# Patient Record
Sex: FEMALE | Race: WHITE | NOT HISPANIC OR LATINO | Employment: OTHER | URBAN - METROPOLITAN AREA
[De-identification: names, ages, dates, MRNs, and addresses within clinical notes are randomized per-mention and may not be internally consistent; named-entity substitution may affect disease eponyms.]

---

## 2017-05-24 ENCOUNTER — RECORDS - HEALTHEAST (OUTPATIENT)
Dept: LAB | Facility: CLINIC | Age: 62
End: 2017-05-24

## 2017-05-25 LAB
CHOLEST SERPL-MCNC: 173 MG/DL
FASTING STATUS PATIENT QL REPORTED: NO
HDLC SERPL-MCNC: 41 MG/DL
LDLC SERPL CALC-MCNC: 88 MG/DL
TRIGL SERPL-MCNC: 218 MG/DL

## 2018-06-11 ENCOUNTER — RECORDS - HEALTHEAST (OUTPATIENT)
Dept: LAB | Facility: CLINIC | Age: 63
End: 2018-06-11

## 2018-06-11 LAB
ALBUMIN SERPL-MCNC: 4.2 G/DL (ref 3.5–5)
ALP SERPL-CCNC: 71 U/L (ref 45–120)
ALT SERPL W P-5'-P-CCNC: 20 U/L (ref 0–45)
ANION GAP SERPL CALCULATED.3IONS-SCNC: 11 MMOL/L (ref 5–18)
AST SERPL W P-5'-P-CCNC: 18 U/L (ref 0–40)
BILIRUB SERPL-MCNC: 0.9 MG/DL (ref 0–1)
BUN SERPL-MCNC: 22 MG/DL (ref 8–22)
CALCIUM SERPL-MCNC: 10.5 MG/DL (ref 8.5–10.5)
CHLORIDE BLD-SCNC: 101 MMOL/L (ref 98–107)
CHOLEST SERPL-MCNC: 184 MG/DL
CO2 SERPL-SCNC: 29 MMOL/L (ref 22–31)
CREAT SERPL-MCNC: 0.75 MG/DL (ref 0.6–1.1)
FASTING STATUS PATIENT QL REPORTED: NO
GFR SERPL CREATININE-BSD FRML MDRD: >60 ML/MIN/1.73M2
GLUCOSE BLD-MCNC: 156 MG/DL (ref 70–125)
HDLC SERPL-MCNC: 45 MG/DL
LDLC SERPL CALC-MCNC: 96 MG/DL
POTASSIUM BLD-SCNC: 4.4 MMOL/L (ref 3.5–5)
PROT SERPL-MCNC: 7.4 G/DL (ref 6–8)
SODIUM SERPL-SCNC: 141 MMOL/L (ref 136–145)
TRIGL SERPL-MCNC: 216 MG/DL

## 2018-06-12 LAB
HPV SOURCE: NORMAL
HUMAN PAPILLOMA VIRUS 16 DNA: NEGATIVE
HUMAN PAPILLOMA VIRUS 18 DNA: NEGATIVE
HUMAN PAPILLOMA VIRUS FINAL DIAGNOSIS: NORMAL
HUMAN PAPILLOMA VIRUS OTHER HR: NEGATIVE
SPECIMEN DESCRIPTION: NORMAL

## 2018-06-20 LAB
BKR LAB AP ABNORMAL BLEEDING: NO
BKR LAB AP BIRTH CONTROL/HORMONES: NORMAL
BKR LAB AP CERVICAL APPEARANCE: NORMAL
BKR LAB AP GYN ADEQUACY: NORMAL
BKR LAB AP GYN INTERPRETATION: NORMAL
BKR LAB AP HPV REFLEX: NORMAL
BKR LAB AP LMP: NORMAL
BKR LAB AP PATIENT STATUS: NORMAL
BKR LAB AP PREVIOUS ABNORMAL: NORMAL
BKR LAB AP PREVIOUS NORMAL: 2012
HIGH RISK?: NO
PATH REPORT.COMMENTS IMP SPEC: NORMAL
RESULT FLAG (HE HISTORICAL CONVERSION): NORMAL

## 2019-04-23 ENCOUNTER — RECORDS - HEALTHEAST (OUTPATIENT)
Dept: LAB | Facility: CLINIC | Age: 64
End: 2019-04-23

## 2019-04-23 LAB
ALBUMIN SERPL-MCNC: 4.1 G/DL (ref 3.5–5)
ANION GAP SERPL CALCULATED.3IONS-SCNC: 7 MMOL/L (ref 5–18)
BUN SERPL-MCNC: 19 MG/DL (ref 8–22)
CALCIUM SERPL-MCNC: 10.2 MG/DL (ref 8.5–10.5)
CHLORIDE BLD-SCNC: 104 MMOL/L (ref 98–107)
CO2 SERPL-SCNC: 29 MMOL/L (ref 22–31)
CREAT SERPL-MCNC: 0.79 MG/DL (ref 0.6–1.1)
GFR SERPL CREATININE-BSD FRML MDRD: >60 ML/MIN/1.73M2
GLUCOSE BLD-MCNC: 199 MG/DL (ref 70–125)
PHOSPHATE SERPL-MCNC: 3 MG/DL (ref 2.5–4.5)
POTASSIUM BLD-SCNC: 5.2 MMOL/L (ref 3.5–5)
SODIUM SERPL-SCNC: 140 MMOL/L (ref 136–145)

## 2019-10-15 ENCOUNTER — RECORDS - HEALTHEAST (OUTPATIENT)
Dept: LAB | Facility: CLINIC | Age: 64
End: 2019-10-15

## 2019-10-15 LAB
CHOLEST SERPL-MCNC: 211 MG/DL
FASTING STATUS PATIENT QL REPORTED: ABNORMAL
HDLC SERPL-MCNC: 45 MG/DL
LDLC SERPL CALC-MCNC: 137 MG/DL
TRIGL SERPL-MCNC: 146 MG/DL

## 2020-10-20 ENCOUNTER — RECORDS - HEALTHEAST (OUTPATIENT)
Dept: LAB | Facility: CLINIC | Age: 65
End: 2020-10-20

## 2020-10-20 LAB
ALBUMIN SERPL-MCNC: 4.2 G/DL (ref 3.5–5)
ALP SERPL-CCNC: 74 U/L (ref 45–120)
ALT SERPL W P-5'-P-CCNC: 20 U/L (ref 0–45)
ANION GAP SERPL CALCULATED.3IONS-SCNC: 7 MMOL/L (ref 5–18)
AST SERPL W P-5'-P-CCNC: 17 U/L (ref 0–40)
BILIRUB SERPL-MCNC: 1.1 MG/DL (ref 0–1)
BUN SERPL-MCNC: 15 MG/DL (ref 8–22)
CALCIUM SERPL-MCNC: 9.9 MG/DL (ref 8.5–10.5)
CHLORIDE BLD-SCNC: 102 MMOL/L (ref 98–107)
CHOLEST SERPL-MCNC: 192 MG/DL
CO2 SERPL-SCNC: 31 MMOL/L (ref 22–31)
CREAT SERPL-MCNC: 0.83 MG/DL (ref 0.6–1.1)
FASTING STATUS PATIENT QL REPORTED: ABNORMAL
GFR SERPL CREATININE-BSD FRML MDRD: >60 ML/MIN/1.73M2
GLUCOSE BLD-MCNC: 197 MG/DL (ref 70–125)
HDLC SERPL-MCNC: 44 MG/DL
LDLC SERPL CALC-MCNC: 113 MG/DL
POTASSIUM BLD-SCNC: 4.1 MMOL/L (ref 3.5–5)
PROT SERPL-MCNC: 7 G/DL (ref 6–8)
SODIUM SERPL-SCNC: 140 MMOL/L (ref 136–145)
TRIGL SERPL-MCNC: 176 MG/DL

## 2020-10-21 LAB — 25(OH)D3 SERPL-MCNC: 22.1 NG/ML (ref 30–80)

## 2021-05-25 ENCOUNTER — RECORDS - HEALTHEAST (OUTPATIENT)
Dept: ADMINISTRATIVE | Facility: CLINIC | Age: 66
End: 2021-05-25

## 2021-05-28 ENCOUNTER — RECORDS - HEALTHEAST (OUTPATIENT)
Dept: ADMINISTRATIVE | Facility: CLINIC | Age: 66
End: 2021-05-28

## 2021-05-30 ENCOUNTER — RECORDS - HEALTHEAST (OUTPATIENT)
Dept: ADMINISTRATIVE | Facility: CLINIC | Age: 66
End: 2021-05-30

## 2021-10-29 ENCOUNTER — LAB REQUISITION (OUTPATIENT)
Dept: LAB | Facility: CLINIC | Age: 66
End: 2021-10-29

## 2021-10-29 DIAGNOSIS — E78.5 HYPERLIPIDEMIA, UNSPECIFIED: ICD-10-CM

## 2021-10-29 LAB
ALBUMIN SERPL-MCNC: 4.4 G/DL (ref 3.5–5)
ALP SERPL-CCNC: 80 U/L (ref 45–120)
ALT SERPL W P-5'-P-CCNC: 14 U/L (ref 0–45)
ANION GAP SERPL CALCULATED.3IONS-SCNC: 12 MMOL/L (ref 5–18)
AST SERPL W P-5'-P-CCNC: 16 U/L (ref 0–40)
BILIRUB SERPL-MCNC: 1.2 MG/DL (ref 0–1)
BUN SERPL-MCNC: 13 MG/DL (ref 8–22)
CALCIUM SERPL-MCNC: 10.4 MG/DL (ref 8.5–10.5)
CHLORIDE BLD-SCNC: 103 MMOL/L (ref 98–107)
CHOLEST SERPL-MCNC: 181 MG/DL
CO2 SERPL-SCNC: 28 MMOL/L (ref 22–31)
CREAT SERPL-MCNC: 0.84 MG/DL (ref 0.6–1.1)
GFR SERPL CREATININE-BSD FRML MDRD: 73 ML/MIN/1.73M2
GLUCOSE BLD-MCNC: 175 MG/DL (ref 70–125)
HDLC SERPL-MCNC: 45 MG/DL
LDLC SERPL CALC-MCNC: 109 MG/DL
POTASSIUM BLD-SCNC: 4.3 MMOL/L (ref 3.5–5)
PROT SERPL-MCNC: 7.4 G/DL (ref 6–8)
SODIUM SERPL-SCNC: 143 MMOL/L (ref 136–145)
TRIGL SERPL-MCNC: 135 MG/DL

## 2021-10-29 PROCEDURE — 80061 LIPID PANEL: CPT | Performed by: FAMILY MEDICINE

## 2021-10-29 PROCEDURE — 80053 COMPREHEN METABOLIC PANEL: CPT | Performed by: FAMILY MEDICINE

## 2022-04-13 ENCOUNTER — LAB REQUISITION (OUTPATIENT)
Dept: LAB | Facility: CLINIC | Age: 67
End: 2022-04-13

## 2022-04-13 DIAGNOSIS — R50.9 FEVER, UNSPECIFIED: ICD-10-CM

## 2022-04-13 PROCEDURE — 87186 SC STD MICRODIL/AGAR DIL: CPT | Performed by: PHYSICIAN ASSISTANT

## 2022-04-15 LAB — BACTERIA UR CULT: ABNORMAL

## 2022-06-22 ENCOUNTER — LAB REQUISITION (OUTPATIENT)
Dept: LAB | Facility: CLINIC | Age: 67
End: 2022-06-22

## 2022-06-22 PROCEDURE — 87086 URINE CULTURE/COLONY COUNT: CPT | Performed by: PHYSICIAN ASSISTANT

## 2022-06-25 LAB — BACTERIA UR CULT: ABNORMAL

## 2022-07-15 ENCOUNTER — LAB REQUISITION (OUTPATIENT)
Dept: LAB | Facility: CLINIC | Age: 67
End: 2022-07-15

## 2022-07-15 DIAGNOSIS — E78.5 HYPERLIPIDEMIA, UNSPECIFIED: ICD-10-CM

## 2022-07-15 LAB
ALBUMIN SERPL BCG-MCNC: 4.5 G/DL (ref 3.5–5.2)
ALP SERPL-CCNC: 81 U/L (ref 35–104)
ALT SERPL W P-5'-P-CCNC: 14 U/L (ref 10–35)
ANION GAP SERPL CALCULATED.3IONS-SCNC: 14 MMOL/L (ref 7–15)
AST SERPL W P-5'-P-CCNC: 21 U/L (ref 10–35)
BILIRUB SERPL-MCNC: 0.6 MG/DL
BUN SERPL-MCNC: 14.5 MG/DL (ref 8–23)
CALCIUM SERPL-MCNC: 10 MG/DL (ref 8.8–10.2)
CHLORIDE SERPL-SCNC: 103 MMOL/L (ref 98–107)
CHOLEST SERPL-MCNC: 199 MG/DL
CREAT SERPL-MCNC: 0.9 MG/DL (ref 0.51–0.95)
DEPRECATED HCO3 PLAS-SCNC: 26 MMOL/L (ref 22–29)
GFR SERPL CREATININE-BSD FRML MDRD: 70 ML/MIN/1.73M2
GLUCOSE SERPL-MCNC: 107 MG/DL (ref 70–99)
HDLC SERPL-MCNC: 49 MG/DL
LDLC SERPL CALC-MCNC: 116 MG/DL
NONHDLC SERPL-MCNC: 150 MG/DL
POTASSIUM SERPL-SCNC: 4.5 MMOL/L (ref 3.4–5.3)
PROT SERPL-MCNC: 7.2 G/DL (ref 6.4–8.3)
SODIUM SERPL-SCNC: 143 MMOL/L (ref 136–145)
TRIGL SERPL-MCNC: 172 MG/DL

## 2022-07-15 PROCEDURE — 80061 LIPID PANEL: CPT | Performed by: FAMILY MEDICINE

## 2022-07-15 PROCEDURE — 80053 COMPREHEN METABOLIC PANEL: CPT | Performed by: FAMILY MEDICINE

## 2022-07-21 ENCOUNTER — ANCILLARY PROCEDURE (OUTPATIENT)
Dept: MAMMOGRAPHY | Facility: HOSPITAL | Age: 67
End: 2022-07-21
Attending: FAMILY MEDICINE
Payer: COMMERCIAL

## 2022-07-21 DIAGNOSIS — Z12.31 SCREENING MAMMOGRAM, ENCOUNTER FOR: ICD-10-CM

## 2022-07-21 PROCEDURE — 77067 SCR MAMMO BI INCL CAD: CPT

## 2023-07-05 ENCOUNTER — LAB REQUISITION (OUTPATIENT)
Dept: LAB | Facility: CLINIC | Age: 68
End: 2023-07-05

## 2023-07-05 DIAGNOSIS — E11.22 TYPE 2 DIABETES MELLITUS WITH DIABETIC CHRONIC KIDNEY DISEASE (H): ICD-10-CM

## 2023-07-05 LAB
ALBUMIN SERPL BCG-MCNC: 4.9 G/DL (ref 3.5–5.2)
ALP SERPL-CCNC: 78 U/L (ref 35–104)
ALT SERPL W P-5'-P-CCNC: 15 U/L (ref 0–50)
ANION GAP SERPL CALCULATED.3IONS-SCNC: 12 MMOL/L (ref 7–15)
AST SERPL W P-5'-P-CCNC: 15 U/L (ref 0–45)
BILIRUB SERPL-MCNC: 0.9 MG/DL
BUN SERPL-MCNC: 20.1 MG/DL (ref 8–23)
CALCIUM SERPL-MCNC: 10.2 MG/DL (ref 8.8–10.2)
CHLORIDE SERPL-SCNC: 102 MMOL/L (ref 98–107)
CHOLEST SERPL-MCNC: 205 MG/DL
CREAT SERPL-MCNC: 0.74 MG/DL (ref 0.51–0.95)
DEPRECATED HCO3 PLAS-SCNC: 26 MMOL/L (ref 22–29)
GFR SERPL CREATININE-BSD FRML MDRD: 88 ML/MIN/1.73M2
GLUCOSE SERPL-MCNC: 206 MG/DL (ref 70–99)
HDLC SERPL-MCNC: 51 MG/DL
LDLC SERPL CALC-MCNC: 129 MG/DL
NONHDLC SERPL-MCNC: 154 MG/DL
POTASSIUM SERPL-SCNC: 4.4 MMOL/L (ref 3.4–5.3)
PROT SERPL-MCNC: 7.3 G/DL (ref 6.4–8.3)
SODIUM SERPL-SCNC: 140 MMOL/L (ref 136–145)
TRIGL SERPL-MCNC: 126 MG/DL

## 2023-07-05 PROCEDURE — 80053 COMPREHEN METABOLIC PANEL: CPT | Performed by: FAMILY MEDICINE

## 2023-07-05 PROCEDURE — 80061 LIPID PANEL: CPT | Performed by: FAMILY MEDICINE

## 2023-07-10 ENCOUNTER — LAB REQUISITION (OUTPATIENT)
Dept: LAB | Facility: CLINIC | Age: 68
End: 2023-07-10

## 2023-07-10 DIAGNOSIS — E11.65 TYPE 2 DIABETES MELLITUS WITH HYPERGLYCEMIA (H): ICD-10-CM

## 2023-07-10 DIAGNOSIS — R31.9 HEMATURIA, UNSPECIFIED: ICD-10-CM

## 2023-07-10 LAB
ALBUMIN UR-MCNC: NEGATIVE MG/DL
APPEARANCE UR: CLEAR
BILIRUB UR QL STRIP: NEGATIVE
COLOR UR AUTO: ABNORMAL
CREAT UR-MCNC: 119 MG/DL
GLUCOSE UR STRIP-MCNC: NEGATIVE MG/DL
HGB UR QL STRIP: NEGATIVE
KETONES UR STRIP-MCNC: NEGATIVE MG/DL
LEUKOCYTE ESTERASE UR QL STRIP: ABNORMAL
MICROALBUMIN UR-MCNC: 27.8 MG/L
MICROALBUMIN/CREAT UR: 23.36 MG/G CR (ref 0–25)
NITRATE UR QL: NEGATIVE
PH UR STRIP: 5.5 [PH] (ref 5–7)
RBC URINE: 0 /HPF
SP GR UR STRIP: 1.02 (ref 1–1.03)
SQUAMOUS EPITHELIAL: 5 /HPF
TRANSITIONAL EPI: <1 /HPF
UROBILINOGEN UR STRIP-MCNC: NORMAL MG/DL
WBC URINE: 6 /HPF

## 2023-07-10 PROCEDURE — 81001 URINALYSIS AUTO W/SCOPE: CPT | Performed by: FAMILY MEDICINE

## 2023-07-10 PROCEDURE — 82570 ASSAY OF URINE CREATININE: CPT | Performed by: FAMILY MEDICINE

## 2023-11-07 ENCOUNTER — LAB REQUISITION (OUTPATIENT)
Dept: LAB | Facility: CLINIC | Age: 68
End: 2023-11-07

## 2023-11-07 DIAGNOSIS — R68.89 OTHER GENERAL SYMPTOMS AND SIGNS: ICD-10-CM

## 2023-11-07 PROCEDURE — 84443 ASSAY THYROID STIM HORMONE: CPT | Performed by: FAMILY MEDICINE

## 2023-11-08 LAB — TSH SERPL DL<=0.005 MIU/L-ACNC: 1.97 UIU/ML (ref 0.3–4.2)

## 2024-05-06 ENCOUNTER — LAB REQUISITION (OUTPATIENT)
Dept: LAB | Facility: CLINIC | Age: 69
End: 2024-05-06

## 2024-05-06 DIAGNOSIS — E11.65 TYPE 2 DIABETES MELLITUS WITH HYPERGLYCEMIA (H): ICD-10-CM

## 2024-05-06 DIAGNOSIS — E78.5 HYPERLIPIDEMIA, UNSPECIFIED: ICD-10-CM

## 2024-05-06 DIAGNOSIS — I10 ESSENTIAL (PRIMARY) HYPERTENSION: ICD-10-CM

## 2024-05-06 LAB
ALBUMIN SERPL BCG-MCNC: 4.6 G/DL (ref 3.5–5.2)
ALP SERPL-CCNC: 62 U/L (ref 40–150)
ALT SERPL W P-5'-P-CCNC: 11 U/L (ref 0–50)
ANION GAP SERPL CALCULATED.3IONS-SCNC: 14 MMOL/L (ref 7–15)
AST SERPL W P-5'-P-CCNC: 15 U/L (ref 0–45)
BILIRUB SERPL-MCNC: 0.7 MG/DL
BUN SERPL-MCNC: 14.5 MG/DL (ref 8–23)
CALCIUM SERPL-MCNC: 10.3 MG/DL (ref 8.8–10.2)
CHLORIDE SERPL-SCNC: 105 MMOL/L (ref 98–107)
CHOLEST SERPL-MCNC: 187 MG/DL
CREAT SERPL-MCNC: 0.74 MG/DL (ref 0.51–0.95)
CREAT UR-MCNC: <1.1 MG/DL
DEPRECATED HCO3 PLAS-SCNC: 24 MMOL/L (ref 22–29)
EGFRCR SERPLBLD CKD-EPI 2021: 88 ML/MIN/1.73M2
FASTING STATUS PATIENT QL REPORTED: ABNORMAL
GLUCOSE SERPL-MCNC: 147 MG/DL (ref 70–99)
HDLC SERPL-MCNC: 51 MG/DL
LDLC SERPL CALC-MCNC: 109 MG/DL
MICROALBUMIN UR-MCNC: >4400 MG/L
MICROALBUMIN/CREAT UR: NORMAL MG/G{CREAT}
NONHDLC SERPL-MCNC: 136 MG/DL
POTASSIUM SERPL-SCNC: 5.1 MMOL/L (ref 3.4–5.3)
PROT SERPL-MCNC: 7.4 G/DL (ref 6.4–8.3)
SODIUM SERPL-SCNC: 143 MMOL/L (ref 135–145)
TRIGL SERPL-MCNC: 136 MG/DL

## 2024-05-06 PROCEDURE — 80061 LIPID PANEL: CPT | Performed by: FAMILY MEDICINE

## 2024-05-06 PROCEDURE — 80053 COMPREHEN METABOLIC PANEL: CPT | Performed by: FAMILY MEDICINE

## 2024-05-06 PROCEDURE — 82043 UR ALBUMIN QUANTITATIVE: CPT | Performed by: FAMILY MEDICINE

## 2024-05-15 ENCOUNTER — LAB REQUISITION (OUTPATIENT)
Dept: LAB | Facility: CLINIC | Age: 69
End: 2024-05-15

## 2024-05-15 DIAGNOSIS — E11.65 TYPE 2 DIABETES MELLITUS WITH HYPERGLYCEMIA (H): ICD-10-CM

## 2024-05-15 PROCEDURE — 82043 UR ALBUMIN QUANTITATIVE: CPT | Performed by: FAMILY MEDICINE

## 2024-05-16 LAB
CREAT UR-MCNC: 86.2 MG/DL
MICROALBUMIN UR-MCNC: 12.2 MG/L
MICROALBUMIN/CREAT UR: 14.15 MG/G CR (ref 0–25)

## 2024-06-25 ENCOUNTER — ANCILLARY PROCEDURE (OUTPATIENT)
Dept: MAMMOGRAPHY | Facility: HOSPITAL | Age: 69
End: 2024-06-25
Attending: FAMILY MEDICINE
Payer: COMMERCIAL

## 2024-06-25 DIAGNOSIS — Z12.31 BREAST CANCER SCREENING BY MAMMOGRAM: ICD-10-CM

## 2024-06-25 PROCEDURE — 77063 BREAST TOMOSYNTHESIS BI: CPT

## 2024-10-04 ENCOUNTER — TRANSFERRED RECORDS (OUTPATIENT)
Dept: HEALTH INFORMATION MANAGEMENT | Facility: CLINIC | Age: 69
End: 2024-10-04
Payer: COMMERCIAL

## 2024-10-18 ENCOUNTER — TRANSCRIBE ORDERS (OUTPATIENT)
Dept: OTHER | Age: 69
End: 2024-10-18

## 2024-10-18 DIAGNOSIS — R94.39 ABNORMAL STRESS TEST: Primary | ICD-10-CM

## 2024-10-22 ENCOUNTER — OFFICE VISIT (OUTPATIENT)
Dept: CARDIOLOGY | Facility: CLINIC | Age: 69
End: 2024-10-22
Attending: FAMILY MEDICINE
Payer: COMMERCIAL

## 2024-10-22 ENCOUNTER — DOCUMENTATION ONLY (OUTPATIENT)
Dept: CARDIOLOGY | Facility: CLINIC | Age: 69
End: 2024-10-22

## 2024-10-22 ENCOUNTER — PREP FOR PROCEDURE (OUTPATIENT)
Dept: CARDIOLOGY | Facility: CLINIC | Age: 69
End: 2024-10-22

## 2024-10-22 VITALS
BODY MASS INDEX: 25.55 KG/M2 | HEIGHT: 66 IN | SYSTOLIC BLOOD PRESSURE: 134 MMHG | HEART RATE: 69 BPM | WEIGHT: 159 LBS | DIASTOLIC BLOOD PRESSURE: 54 MMHG | RESPIRATION RATE: 15 BRPM

## 2024-10-22 DIAGNOSIS — R94.39 ABNORMAL STRESS TEST: ICD-10-CM

## 2024-10-22 DIAGNOSIS — R07.9 CHEST PAIN: ICD-10-CM

## 2024-10-22 DIAGNOSIS — R94.39 ABNORMAL STRESS TEST: Primary | ICD-10-CM

## 2024-10-22 PROCEDURE — 99205 OFFICE O/P NEW HI 60 MIN: CPT | Performed by: INTERNAL MEDICINE

## 2024-10-22 PROCEDURE — G2211 COMPLEX E/M VISIT ADD ON: HCPCS | Performed by: INTERNAL MEDICINE

## 2024-10-22 RX ORDER — LIDOCAINE 40 MG/G
CREAM TOPICAL
Status: CANCELLED | OUTPATIENT
Start: 2024-10-22

## 2024-10-22 RX ORDER — ROSUVASTATIN CALCIUM 10 MG/1
10 TABLET, COATED ORAL DAILY
Qty: 90 TABLET | Refills: 3 | Status: ON HOLD | OUTPATIENT
Start: 2024-10-22 | End: 2024-10-31

## 2024-10-22 RX ORDER — NITROGLYCERIN 0.4 MG/1
TABLET SUBLINGUAL
Qty: 15 TABLET | Refills: 5 | Status: ON HOLD | OUTPATIENT
Start: 2024-10-22 | End: 2024-10-31

## 2024-10-22 RX ORDER — METFORMIN HYDROCHLORIDE 500 MG/1
500 TABLET, EXTENDED RELEASE ORAL
Status: ON HOLD | COMMUNITY
End: 2024-10-31

## 2024-10-22 RX ORDER — DEXTROSE MONOHYDRATE 25 G/50ML
25-50 INJECTION, SOLUTION INTRAVENOUS
Status: CANCELLED | OUTPATIENT
Start: 2024-10-23

## 2024-10-22 RX ORDER — SODIUM CHLORIDE 9 MG/ML
INJECTION, SOLUTION INTRAVENOUS CONTINUOUS
Status: CANCELLED | OUTPATIENT
Start: 2024-10-23

## 2024-10-22 RX ORDER — FENTANYL CITRATE 50 UG/ML
25 INJECTION, SOLUTION INTRAMUSCULAR; INTRAVENOUS
Status: CANCELLED | OUTPATIENT
Start: 2024-10-23

## 2024-10-22 RX ORDER — NICOTINE POLACRILEX 4 MG
15-30 LOZENGE BUCCAL
Status: CANCELLED | OUTPATIENT
Start: 2024-10-23

## 2024-10-22 RX ORDER — ASPIRIN 81 MG/1
243 TABLET, CHEWABLE ORAL ONCE
Status: CANCELLED | OUTPATIENT
Start: 2024-10-23

## 2024-10-22 RX ORDER — ASPIRIN 325 MG
325 TABLET ORAL ONCE
Status: CANCELLED | OUTPATIENT
Start: 2024-10-23 | End: 2024-10-22

## 2024-10-22 NOTE — PROGRESS NOTES
Holly Beal  [address] [facility] [address]  [phone] (home)     Procedure cardiologist:  Dr. Cheng or Dr. Pendleton  PCP:  Cj Perez  H&P completed by:  10/22/24 [facility]  Admit date: 10/23/24  Arrival time:  1200  Anticoagulation: None  CPAP: No  Previous PCI: No  Bypass Grafts: No  Renal Issues: No  Diabetic?: Yes - Metformin  Device?: No  Type:  N/A  Ambulation status: Independent    Patient Education/  Angiogram Teaching    Reason for Visit:  Patient seen for pre-procedure education in preparation for: CA poss PCI    Procedure Prep:  Primary Cardiologist note dated: 10/22/24 [facility]  EKG results obtained, dated: To be done on admission  Hemogram results obtained: To be done on admission  Basic Metabolic Panel results obtained: To be done on admission  Lipid Profile results obtained: 5/6/24, to be done on admission  Pertinent cardiac test results: 10/17/24 Abnormal stress echo    Pre-procedure instructions  Patient instructed to be NPO per anesthesia guidelines.  Patient instructed to shower the evening before or the morning of the procedure.  Patient instructed to arrange for transportation home following procedure from a responsible family member of friend (, Leif). No driving for at least 24 hours.  Patient instructed to have a responsible adult with them for 24 hours post-procedure (, Leif).  Post-procedure follow up process.  Conscious sedation discussed.    Pre-procedure medication instructions  Patient instructed on antiplatelet medication.  Continue medications as scheduled, with a small amount of water on the day of the procedure unless indicated.  Patient instructed to take 4-81 mg of Aspirin AM of procedure: Yes  Other medication: Instructed to TAKE Lisinopril AM of the procedure. Instructed to HOLD all supplements, vitamins, and as needed medications AM of the procedure. Instructed to HOLD metformin evening before procedure and 48 hours after (10/22,  10/23, 10/24).       Patient states understanding of procedure and agrees to proceed.    *PATIENTS RECORDS AVAILABLE IN [facility] UNLESS OTHERWISE INDICATED*      Patient Active Problem List   Diagnosis    Calcium kidney stone    Kidney stone on left side       Current Outpatient Medications   Medication Sig Dispense Refill    albuterol (PROVENTIL HFA;VENTOLIN HFA) 90 mcg/actuation inhaler [ALBUTEROL (PROVENTIL HFA;VENTOLIN HFA) 90 MCG/ACTUATION INHALER] Inhale 2 puffs every 6 (six) hours as needed for wheezing or shortness of breath.      Alpha Lipoic Ac-Biotin-Keratin (BIOTIN-KERATIN-ALPHA LIPOIC AC PO) Take 1 capsule by mouth daily.      ALPRAZolam (XANAX) 0.5 MG tablet [ALPRAZOLAM (XANAX) 0.5 MG TABLET] Take 0.5 mg by mouth 3 (three) times a day as needed for anxiety.      aspirin 81 MG EC tablet [ASPIRIN 81 MG EC TABLET] Take 81 mg by mouth daily.      AZO-CRANBERRY PO Take 1 tablet by mouth three times a week.      cyanocobalamin 1000 MCG tablet [CYANOCOBALAMIN 1000 MCG TABLET] Take 1,000 mcg by mouth daily.      fluticasone (FLONASE) 50 mcg/actuation nasal spray [FLUTICASONE (FLONASE) 50 MCG/ACTUATION NASAL SPRAY] 2 sprays into each nostril daily as needed for rhinitis.      lisinopril (PRINIVIL,ZESTRIL) 10 MG tablet Take 10 mg by mouth daily.      metFORMIN (GLUCOPHAGE XR) 500 MG 24 hr tablet Take 500 mg by mouth daily (with dinner).      omeprazole (PRILOSEC) 20 MG capsule [OMEPRAZOLE (PRILOSEC) 20 MG CAPSULE] Take 20 mg by mouth every evening.       pravastatin (PRAVACHOL) 20 MG tablet [PRAVASTATIN (PRAVACHOL) 20 MG TABLET] Take 20 mg by mouth bedtime.      Probiotic Product (PROBIOTIC 10 ULTRA STRENGTH PO) Take 1 capsule by mouth three times a week. Alternates  with AZO Cranberry.      rosuvastatin (CRESTOR) 10 MG tablet Take 1 tablet (10 mg) by mouth daily. 90 tablet 3       Allergies   Allergen Reactions    Atorvastatin Nausea    Simvastatin Nausea         Evie Zambrano, RN, BSN

## 2024-10-22 NOTE — LETTER
10/22/2024    Cj Perez MD  [address]  [facility] [address]    RE: Holly Beal       Dear Colleague,     I had the pleasure of seeing Holly Beal in the [facility].    HEART CARE ENCOUNTER CONSULTATON NOTE      [facility]  [phone]      Assessment/Recommendations   Assessment:  1.  Abnormal stress test: Markedly abnormal exercise stress echocardiogram showing a large area of anterior/apical/septal ischemia with positive EKG findings and symptoms.  Recommend proceeding with coronary angiogram for further intervention.  Explained risk and benefits of procedure to patient and she is agreeable.  2.  Dyslipidemia: Goal LDL of less than 70.  Stop pravastatin and start on Crestor 10 mg daily  3.  Diabetes mellitus type 2: Management per primary team  4.  Anxiety/depression    Plan:  1.  Coronary angiography with possible intervention  2.  Continue on aspirin and will increase intensity statin dosing.  Changed to Crestor 10 mg daily       History of Present Illness/Subjective    HPI: Holly Beal is a 69 year old female with history of diabetes mellitus type 2, dyslipidemia, anxiety/depression who I am seeing today for an urgent rapid access visit after an abnormal stress test.  Exercise stress echocardiogram was markedly abnormal.  This was done at outside facility.  She exercised for 6 minutes achieving 93% of target heart rate with symptoms of chest pain and shortness of breath.  Stress echocardiogram was abnormal with 1.5-1.9 horizontal ST depressions and echocardiogram was remarkable for large area of apical, anterior, anteroseptal ischemia with a drop in her left ventricular ejection fraction with stress as well.  She notes that her symptoms began about 3 to 4 weeks ago.  She walks regularly.  She was walking about a half a mile to her daughter's home when she started feeling chest discomfort.  The chest pain felt like a tightness and  "pressure sensation and was with shortness of breath and pain between her shoulder blades.  Resolved after about 5 minutes.  She has had 3 further brief episodes of chest pain with exertion since that time all with walking.  Normally very active.  She bikes, gardens and walks 2 to 4 miles a day.  Twelve-lead EKG from 10//24 personally reviewed shows sinus bradycardia at 53 bpm otherwise unremarkable.  The longitudinal plan of care for the diagnosis(es)/condition(s) as documented were addressed during this visit. Due to the added complexity in care, I will continue to support Holly in the subsequent management and with ongoing continuity of care.        Physical Examination  Review of Systems   Vitals: /54 (BP Location: Right arm, Patient Position: Sitting, Cuff Size: Adult Regular)   Pulse 69   Resp 15   Ht 1.676 m (5' 6\")   Wt 72.1 kg (159 lb)   BMI 25.66 kg/m    BMI= Body mass index is 25.66 kg/m .  Wt Readings from Last 3 Encounters:   10/22/24 72.1 kg (159 lb)   10/23/15 76.6 kg (168 lb 12.8 oz)       General Appearance:   no distress, normal body habitus   ENT/Mouth: membranes moist, no oral lesions or bleeding gums.      EYES:  no scleral icterus, normal conjunctivae   Neck: no carotid bruits or thyromegaly   Chest/Lungs:   lungs are clear to auscultation   Cardiovascular:   Regular. Normal first and second heart sounds with no murmur no edema bilaterally    Abdomen:  no organomegaly, masses, bruits, or tenderness; bowel sounds are present   Extremities: no cyanosis or clubbing   Skin: no xanthelasma, warm.    Neurologic: normal  bilateral, no tremors     Psychiatric: alert and oriented x3, calm        Please refer above for cardiac ROS details.        Medical History  Surgical History Family History Social History   No past medical history on file.  Past Surgical History:   Procedure Laterality Date      SECTION  ,88     IR NEPHROLITHOTOMY  10/22/2015     OTHER SURGICAL HISTORY   "    Esophagus line surgeryremoval of guide wire     No family history on file.     Social History     Socioeconomic History     Marital status:      Spouse name: Not on file     Number of children: Not on file     Years of education: Not on file     Highest education level: Not on file   Occupational History     Not on file   Tobacco Use     Smoking status: Never     Smokeless tobacco: Not on file   Substance and Sexual Activity     Alcohol use: Yes     Comment: Alcoholic Drinks/day: rare     Drug use: No     Sexual activity: Not on file   Other Topics Concern     Not on file   Social History Narrative     Not on file     Social Determinants of Health     Financial Resource Strain: Not on file   Food Insecurity: Not on file   Transportation Needs: Not on file   Physical Activity: Not on file   Stress: Not on file   Social Connections: Not on file   Interpersonal Safety: Not on file   Housing Stability: Not on file           Medications  Allergies   Current Outpatient Medications   Medication Sig Dispense Refill     albuterol (PROVENTIL HFA;VENTOLIN HFA) 90 mcg/actuation inhaler [ALBUTEROL (PROVENTIL HFA;VENTOLIN HFA) 90 MCG/ACTUATION INHALER] Inhale 2 puffs every 6 (six) hours as needed for wheezing or shortness of breath.       Alpha Lipoic Ac-Biotin-Keratin (BIOTIN-KERATIN-ALPHA LIPOIC AC PO) Take 1 capsule by mouth daily.       ALPRAZolam (XANAX) 0.5 MG tablet [ALPRAZOLAM (XANAX) 0.5 MG TABLET] Take 0.5 mg by mouth 3 (three) times a day as needed for anxiety.       aspirin 81 MG EC tablet [ASPIRIN 81 MG EC TABLET] Take 81 mg by mouth daily.       AZO-CRANBERRY PO Take 1 tablet by mouth three times a week.       cyanocobalamin 1000 MCG tablet [CYANOCOBALAMIN 1000 MCG TABLET] Take 1,000 mcg by mouth daily.       fluticasone (FLONASE) 50 mcg/actuation nasal spray [FLUTICASONE (FLONASE) 50 MCG/ACTUATION NASAL SPRAY] 2 sprays into each nostril daily as needed for rhinitis.       lisinopril (PRINIVIL,ZESTRIL) 10  "MG tablet Take 10 mg by mouth daily.       metFORMIN (GLUCOPHAGE XR) 500 MG 24 hr tablet Take 500 mg by mouth daily (with dinner).       omeprazole (PRILOSEC) 20 MG capsule [OMEPRAZOLE (PRILOSEC) 20 MG CAPSULE] Take 20 mg by mouth every evening.        pravastatin (PRAVACHOL) 20 MG tablet [PRAVASTATIN (PRAVACHOL) 20 MG TABLET] Take 20 mg by mouth bedtime.       Probiotic Product (PROBIOTIC 10 ULTRA STRENGTH PO) Take 1 capsule by mouth three times a week. Alternates  with AZO Cranberry.       rosuvastatin (CRESTOR) 10 MG tablet Take 1 tablet (10 mg) by mouth daily. 90 tablet 3       Allergies   Allergen Reactions     Atorvastatin Nausea     Simvastatin Nausea          Lab Results    Chemistry/lipid CBC Cardiac Enzymes/BNP/TSH/INR   Recent Labs   Lab Test 05/06/24  1111   CHOL 187   HDL 51   *   TRIG 136     Recent Labs   Lab Test 05/06/24  1111 07/05/23  1235 07/15/22  1543   * 129* 116*     Recent Labs   Lab Test 05/06/24  1111      POTASSIUM 5.1   CHLORIDE 105   CO2 24   *   BUN 14.5   CR 0.74   GFRESTIMATED 88   SHAQUILLE 10.3*     Recent Labs   Lab Test 05/06/24  1111 07/05/23  1235 07/15/22  1543   CR 0.74 0.74 0.90     No results for input(s): \"A1C\" in the last 91117 hours.       No results for input(s): \"WBC\", \"HGB\", \"HCT\", \"MCV\", \"PLT\" in the last 72476 hours.  No results for input(s): \"HGB\" in the last 16629 hours. No results for input(s): \"TROPONINI\" in the last 51783 hours.  No results for input(s): \"BNP\", \"NTBNPI\", \"NTBNP\" in the last 78552 hours.  Recent Labs   Lab Test 11/07/23  1644   TSH 1.97     No results for input(s): \"INR\" in the last 59805 hours.     Olga Godwin MD                                        Thank you for allowing me to participate in the care of your patient.      Sincerely,     Olga Godwin MD     [facility]  cc:   Kassandra Calderon MD  [address]  [place] [address]      "

## 2024-10-22 NOTE — PROGRESS NOTES
----- Message -----  From: Pina Wilkerson  Sent: 10/22/2024  11:00 AM CDT  To: Evie Zambrano RN    Case type: CA Poss PCI  Procedure Physician(s): RD/ELICEO  Procedure Date and Patient Arrival Time: Wednesday 10/23, with arrival time of 12PM  H&P: Completed on 10/22  Pre-Procedure Lab Appt: on admission  - Please place lab orders if you haven't already!  Alerts/Important Info: Diabetic  Interpretor Requested: n/a    Thank you,  Pina

## 2024-10-22 NOTE — PROGRESS NOTES
HEART CARE ENCOUNTER CONSULTATON NOTE      [facility]  [phone]      Assessment/Recommendations   Assessment:  1.  Abnormal stress test: Markedly abnormal exercise stress echocardiogram showing a large area of anterior/apical/septal ischemia with positive EKG findings and symptoms.  Recommend proceeding with coronary angiogram for further intervention.  Explained risk and benefits of procedure to patient and she is agreeable.  2.  Dyslipidemia: Goal LDL of less than 70.  Stop pravastatin and start on Crestor 10 mg daily  3.  Diabetes mellitus type 2: Management per primary team  4.  Anxiety/depression    Plan:  1.  Coronary angiography with possible intervention  2.  Continue on aspirin and will increase intensity statin dosing.  Changed to Crestor 10 mg daily       History of Present Illness/Subjective    HPI: Holly Beal is a 69 year old female with history of diabetes mellitus type 2, dyslipidemia, anxiety/depression who I am seeing today for an urgent rapid access visit after an abnormal stress test.  Exercise stress echocardiogram was markedly abnormal.  This was done at outside facility.  She exercised for 6 minutes achieving 93% of target heart rate with symptoms of chest pain and shortness of breath.  Stress echocardiogram was abnormal with 1.5-1.9 horizontal ST depressions and echocardiogram was remarkable for large area of apical, anterior, anteroseptal ischemia with a drop in her left ventricular ejection fraction with stress as well.  She notes that her symptoms began about 3 to 4 weeks ago.  She walks regularly.  She was walking about a half a mile to her daughter's home when she started feeling chest discomfort.  The chest pain felt like a tightness and pressure sensation and was with shortness of breath and pain between her shoulder blades.  Resolved after about 5 minutes.  She has had 3 further brief episodes of chest pain with exertion since that time all with walking.   "Normally very active.  She bikes, gardens and walks 2 to 4 miles a day.  Twelve-lead EKG from 10//24 personally reviewed shows sinus bradycardia at 53 bpm otherwise unremarkable.  The longitudinal plan of care for the diagnosis(es)/condition(s) as documented were addressed during this visit. Due to the added complexity in care, I will continue to support Holly in the subsequent management and with ongoing continuity of care.        Physical Examination  Review of Systems   Vitals: /54 (BP Location: Right arm, Patient Position: Sitting, Cuff Size: Adult Regular)   Pulse 69   Resp 15   Ht 1.676 m (5' 6\")   Wt 72.1 kg (159 lb)   BMI 25.66 kg/m    BMI= Body mass index is 25.66 kg/m .  Wt Readings from Last 3 Encounters:   10/22/24 72.1 kg (159 lb)   10/23/15 76.6 kg (168 lb 12.8 oz)       General Appearance:   no distress, normal body habitus   ENT/Mouth: membranes moist, no oral lesions or bleeding gums.      EYES:  no scleral icterus, normal conjunctivae   Neck: no carotid bruits or thyromegaly   Chest/Lungs:   lungs are clear to auscultation   Cardiovascular:   Regular. Normal first and second heart sounds with no murmur no edema bilaterally    Abdomen:  no organomegaly, masses, bruits, or tenderness; bowel sounds are present   Extremities: no cyanosis or clubbing   Skin: no xanthelasma, warm.    Neurologic: normal  bilateral, no tremors     Psychiatric: alert and oriented x3, calm        Please refer above for cardiac ROS details.        Medical History  Surgical History Family History Social History   No past medical history on file.  Past Surgical History:   Procedure Laterality Date     SECTION  ,    IR NEPHROLITHOTOMY  10/22/2015    OTHER SURGICAL HISTORY      Esophagus line surgeryremoval of guide wire     No family history on file.     Social History     Socioeconomic History    Marital status:      Spouse name: Not on file    Number of children: Not on file    Years of " education: Not on file    Highest education level: Not on file   Occupational History    Not on file   Tobacco Use    Smoking status: Never    Smokeless tobacco: Not on file   Substance and Sexual Activity    Alcohol use: Yes     Comment: Alcoholic Drinks/day: rare    Drug use: No    Sexual activity: Not on file   Other Topics Concern    Not on file   Social History Narrative    Not on file     Social Determinants of Health     Financial Resource Strain: Not on file   Food Insecurity: Not on file   Transportation Needs: Not on file   Physical Activity: Not on file   Stress: Not on file   Social Connections: Not on file   Interpersonal Safety: Not on file   Housing Stability: Not on file           Medications  Allergies   Current Outpatient Medications   Medication Sig Dispense Refill    albuterol (PROVENTIL HFA;VENTOLIN HFA) 90 mcg/actuation inhaler [ALBUTEROL (PROVENTIL HFA;VENTOLIN HFA) 90 MCG/ACTUATION INHALER] Inhale 2 puffs every 6 (six) hours as needed for wheezing or shortness of breath.      Alpha Lipoic Ac-Biotin-Keratin (BIOTIN-KERATIN-ALPHA LIPOIC AC PO) Take 1 capsule by mouth daily.      ALPRAZolam (XANAX) 0.5 MG tablet [ALPRAZOLAM (XANAX) 0.5 MG TABLET] Take 0.5 mg by mouth 3 (three) times a day as needed for anxiety.      aspirin 81 MG EC tablet [ASPIRIN 81 MG EC TABLET] Take 81 mg by mouth daily.      AZO-CRANBERRY PO Take 1 tablet by mouth three times a week.      cyanocobalamin 1000 MCG tablet [CYANOCOBALAMIN 1000 MCG TABLET] Take 1,000 mcg by mouth daily.      fluticasone (FLONASE) 50 mcg/actuation nasal spray [FLUTICASONE (FLONASE) 50 MCG/ACTUATION NASAL SPRAY] 2 sprays into each nostril daily as needed for rhinitis.      lisinopril (PRINIVIL,ZESTRIL) 10 MG tablet Take 10 mg by mouth daily.      metFORMIN (GLUCOPHAGE XR) 500 MG 24 hr tablet Take 500 mg by mouth daily (with dinner).      omeprazole (PRILOSEC) 20 MG capsule [OMEPRAZOLE (PRILOSEC) 20 MG CAPSULE] Take 20 mg by mouth every evening.     "   pravastatin (PRAVACHOL) 20 MG tablet [PRAVASTATIN (PRAVACHOL) 20 MG TABLET] Take 20 mg by mouth bedtime.      Probiotic Product (PROBIOTIC 10 ULTRA STRENGTH PO) Take 1 capsule by mouth three times a week. Alternates  with AZO Cranberry.      rosuvastatin (CRESTOR) 10 MG tablet Take 1 tablet (10 mg) by mouth daily. 90 tablet 3       Allergies   Allergen Reactions    Atorvastatin Nausea    Simvastatin Nausea          Lab Results    Chemistry/lipid CBC Cardiac Enzymes/BNP/TSH/INR   Recent Labs   Lab Test 05/06/24  1111   CHOL 187   HDL 51   *   TRIG 136     Recent Labs   Lab Test 05/06/24  1111 07/05/23  1235 07/15/22  1543   * 129* 116*     Recent Labs   Lab Test 05/06/24  1111      POTASSIUM 5.1   CHLORIDE 105   CO2 24   *   BUN 14.5   CR 0.74   GFRESTIMATED 88   SHAQUILLE 10.3*     Recent Labs   Lab Test 05/06/24  1111 07/05/23  1235 07/15/22  1543   CR 0.74 0.74 0.90     No results for input(s): \"A1C\" in the last 05143 hours.       No results for input(s): \"WBC\", \"HGB\", \"HCT\", \"MCV\", \"PLT\" in the last 13943 hours.  No results for input(s): \"HGB\" in the last 58701 hours. No results for input(s): \"TROPONINI\" in the last 66912 hours.  No results for input(s): \"BNP\", \"NTBNPI\", \"NTBNP\" in the last 00472 hours.  Recent Labs   Lab Test 11/07/23  1644   TSH 1.97     No results for input(s): \"INR\" in the last 30120 hours.     Olga Godwin MD                                      "

## 2024-10-22 NOTE — H&P (VIEW-ONLY)
HEART CARE ENCOUNTER CONSULTATON NOTE      [facility]  [phone]      Assessment/Recommendations   Assessment:  1.  Abnormal stress test: Markedly abnormal exercise stress echocardiogram showing a large area of anterior/apical/septal ischemia with positive EKG findings and symptoms.  Recommend proceeding with coronary angiogram for further intervention.  Explained risk and benefits of procedure to patient and she is agreeable.  2.  Dyslipidemia: Goal LDL of less than 70.  Stop pravastatin and start on Crestor 10 mg daily  3.  Diabetes mellitus type 2: Management per primary team  4.  Anxiety/depression    Plan:  1.  Coronary angiography with possible intervention  2.  Continue on aspirin and will increase intensity statin dosing.  Changed to Crestor 10 mg daily       History of Present Illness/Subjective    HPI: Holly Beal is a 69 year old female with history of diabetes mellitus type 2, dyslipidemia, anxiety/depression who I am seeing today for an urgent rapid access visit after an abnormal stress test.  Exercise stress echocardiogram was markedly abnormal.  This was done at outside facility.  She exercised for 6 minutes achieving 93% of target heart rate with symptoms of chest pain and shortness of breath.  Stress echocardiogram was abnormal with 1.5-1.9 horizontal ST depressions and echocardiogram was remarkable for large area of apical, anterior, anteroseptal ischemia with a drop in her left ventricular ejection fraction with stress as well.  She notes that her symptoms began about 3 to 4 weeks ago.  She walks regularly.  She was walking about a half a mile to her daughter's home when she started feeling chest discomfort.  The chest pain felt like a tightness and pressure sensation and was with shortness of breath and pain between her shoulder blades.  Resolved after about 5 minutes.  She has had 3 further brief episodes of chest pain with exertion since that time all with walking.   "Normally very active.  She bikes, gardens and walks 2 to 4 miles a day.  Twelve-lead EKG from 10//24 personally reviewed shows sinus bradycardia at 53 bpm otherwise unremarkable.  The longitudinal plan of care for the diagnosis(es)/condition(s) as documented were addressed during this visit. Due to the added complexity in care, I will continue to support Holly in the subsequent management and with ongoing continuity of care.        Physical Examination  Review of Systems   Vitals: /54 (BP Location: Right arm, Patient Position: Sitting, Cuff Size: Adult Regular)   Pulse 69   Resp 15   Ht 1.676 m (5' 6\")   Wt 72.1 kg (159 lb)   BMI 25.66 kg/m    BMI= Body mass index is 25.66 kg/m .  Wt Readings from Last 3 Encounters:   10/22/24 72.1 kg (159 lb)   10/23/15 76.6 kg (168 lb 12.8 oz)       General Appearance:   no distress, normal body habitus   ENT/Mouth: membranes moist, no oral lesions or bleeding gums.      EYES:  no scleral icterus, normal conjunctivae   Neck: no carotid bruits or thyromegaly   Chest/Lungs:   lungs are clear to auscultation   Cardiovascular:   Regular. Normal first and second heart sounds with no murmur no edema bilaterally    Abdomen:  no organomegaly, masses, bruits, or tenderness; bowel sounds are present   Extremities: no cyanosis or clubbing   Skin: no xanthelasma, warm.    Neurologic: normal  bilateral, no tremors     Psychiatric: alert and oriented x3, calm        Please refer above for cardiac ROS details.        Medical History  Surgical History Family History Social History   No past medical history on file.  Past Surgical History:   Procedure Laterality Date     SECTION  ,    IR NEPHROLITHOTOMY  10/22/2015    OTHER SURGICAL HISTORY      Esophagus line surgeryremoval of guide wire     No family history on file.     Social History     Socioeconomic History    Marital status:      Spouse name: Not on file    Number of children: Not on file    Years of " education: Not on file    Highest education level: Not on file   Occupational History    Not on file   Tobacco Use    Smoking status: Never    Smokeless tobacco: Not on file   Substance and Sexual Activity    Alcohol use: Yes     Comment: Alcoholic Drinks/day: rare    Drug use: No    Sexual activity: Not on file   Other Topics Concern    Not on file   Social History Narrative    Not on file     Social Determinants of Health     Financial Resource Strain: Not on file   Food Insecurity: Not on file   Transportation Needs: Not on file   Physical Activity: Not on file   Stress: Not on file   Social Connections: Not on file   Interpersonal Safety: Not on file   Housing Stability: Not on file           Medications  Allergies   Current Outpatient Medications   Medication Sig Dispense Refill    albuterol (PROVENTIL HFA;VENTOLIN HFA) 90 mcg/actuation inhaler [ALBUTEROL (PROVENTIL HFA;VENTOLIN HFA) 90 MCG/ACTUATION INHALER] Inhale 2 puffs every 6 (six) hours as needed for wheezing or shortness of breath.      Alpha Lipoic Ac-Biotin-Keratin (BIOTIN-KERATIN-ALPHA LIPOIC AC PO) Take 1 capsule by mouth daily.      ALPRAZolam (XANAX) 0.5 MG tablet [ALPRAZOLAM (XANAX) 0.5 MG TABLET] Take 0.5 mg by mouth 3 (three) times a day as needed for anxiety.      aspirin 81 MG EC tablet [ASPIRIN 81 MG EC TABLET] Take 81 mg by mouth daily.      AZO-CRANBERRY PO Take 1 tablet by mouth three times a week.      cyanocobalamin 1000 MCG tablet [CYANOCOBALAMIN 1000 MCG TABLET] Take 1,000 mcg by mouth daily.      fluticasone (FLONASE) 50 mcg/actuation nasal spray [FLUTICASONE (FLONASE) 50 MCG/ACTUATION NASAL SPRAY] 2 sprays into each nostril daily as needed for rhinitis.      lisinopril (PRINIVIL,ZESTRIL) 10 MG tablet Take 10 mg by mouth daily.      metFORMIN (GLUCOPHAGE XR) 500 MG 24 hr tablet Take 500 mg by mouth daily (with dinner).      omeprazole (PRILOSEC) 20 MG capsule [OMEPRAZOLE (PRILOSEC) 20 MG CAPSULE] Take 20 mg by mouth every evening.     "   pravastatin (PRAVACHOL) 20 MG tablet [PRAVASTATIN (PRAVACHOL) 20 MG TABLET] Take 20 mg by mouth bedtime.      Probiotic Product (PROBIOTIC 10 ULTRA STRENGTH PO) Take 1 capsule by mouth three times a week. Alternates  with AZO Cranberry.      rosuvastatin (CRESTOR) 10 MG tablet Take 1 tablet (10 mg) by mouth daily. 90 tablet 3       Allergies   Allergen Reactions    Atorvastatin Nausea    Simvastatin Nausea          Lab Results    Chemistry/lipid CBC Cardiac Enzymes/BNP/TSH/INR   Recent Labs   Lab Test 05/06/24  1111   CHOL 187   HDL 51   *   TRIG 136     Recent Labs   Lab Test 05/06/24  1111 07/05/23  1235 07/15/22  1543   * 129* 116*     Recent Labs   Lab Test 05/06/24  1111      POTASSIUM 5.1   CHLORIDE 105   CO2 24   *   BUN 14.5   CR 0.74   GFRESTIMATED 88   SHAQUILLE 10.3*     Recent Labs   Lab Test 05/06/24  1111 07/05/23  1235 07/15/22  1543   CR 0.74 0.74 0.90     No results for input(s): \"A1C\" in the last 65925 hours.       No results for input(s): \"WBC\", \"HGB\", \"HCT\", \"MCV\", \"PLT\" in the last 57447 hours.  No results for input(s): \"HGB\" in the last 63173 hours. No results for input(s): \"TROPONINI\" in the last 79919 hours.  No results for input(s): \"BNP\", \"NTBNPI\", \"NTBNP\" in the last 69215 hours.  Recent Labs   Lab Test 11/07/23  1644   TSH 1.97     No results for input(s): \"INR\" in the last 02841 hours.     Olga Godwin MD                                      "

## 2024-10-23 ENCOUNTER — HOSPITAL ENCOUNTER (INPATIENT)
Facility: HOSPITAL | Age: 69
LOS: 8 days | Discharge: HOME | DRG: 234 | End: 2024-10-31
Attending: STUDENT IN AN ORGANIZED HEALTH CARE EDUCATION/TRAINING PROGRAM | Admitting: THORACIC SURGERY (CARDIOTHORACIC VASCULAR SURGERY)
Payer: COMMERCIAL

## 2024-10-23 ENCOUNTER — PREP FOR PROCEDURE (OUTPATIENT)
Dept: ADMINISTRATIVE | Facility: CLINIC | Age: 69
End: 2024-10-23

## 2024-10-23 DIAGNOSIS — R07.9 CHEST PAIN: ICD-10-CM

## 2024-10-23 DIAGNOSIS — I25.10 CAD (CORONARY ARTERY DISEASE): Primary | ICD-10-CM

## 2024-10-23 DIAGNOSIS — R94.39 ABNORMAL STRESS TEST: ICD-10-CM

## 2024-10-23 DIAGNOSIS — Z95.1 S/P CABG (CORONARY ARTERY BYPASS GRAFT): Primary | ICD-10-CM

## 2024-10-23 DIAGNOSIS — J45.20 MILD INTERMITTENT ASTHMA WITHOUT COMPLICATION: ICD-10-CM

## 2024-10-23 DIAGNOSIS — N20.0 CALCIUM KIDNEY STONE: ICD-10-CM

## 2024-10-23 DIAGNOSIS — I20.0 ACCELERATING ANGINA (H): ICD-10-CM

## 2024-10-23 DIAGNOSIS — N20.0 KIDNEY STONE ON LEFT SIDE: ICD-10-CM

## 2024-10-23 PROBLEM — Z98.890 STATUS POST CORONARY ANGIOGRAM: Status: ACTIVE | Noted: 2024-10-23

## 2024-10-23 LAB
ABO/RH(D): NORMAL
ANION GAP SERPL CALCULATED.3IONS-SCNC: 13 MMOL/L (ref 7–15)
ANTIBODY SCREEN: NEGATIVE
ATRIAL RATE - MUSE: 81 BPM
BUN SERPL-MCNC: 10.7 MG/DL (ref 8–23)
CALCIUM SERPL-MCNC: 10 MG/DL (ref 8.8–10.4)
CHLORIDE SERPL-SCNC: 100 MMOL/L (ref 98–107)
CREAT SERPL-MCNC: 0.73 MG/DL (ref 0.51–0.95)
DIASTOLIC BLOOD PRESSURE - MUSE: NORMAL MMHG
EGFRCR SERPLBLD CKD-EPI 2021: 89 ML/MIN/1.73M2
ERYTHROCYTE [DISTWIDTH] IN BLOOD BY AUTOMATED COUNT: 11.9 % (ref 10–15)
ERYTHROCYTE [DISTWIDTH] IN BLOOD BY AUTOMATED COUNT: 11.9 % (ref 10–15)
EST. AVERAGE GLUCOSE BLD GHB EST-MCNC: 157 MG/DL
GLUCOSE BLDC GLUCOMTR-MCNC: 113 MG/DL (ref 70–99)
GLUCOSE BLDC GLUCOMTR-MCNC: 137 MG/DL (ref 70–99)
GLUCOSE BLDC GLUCOMTR-MCNC: 144 MG/DL (ref 70–99)
GLUCOSE SERPL-MCNC: 191 MG/DL (ref 70–99)
HBA1C MFR BLD: 7.1 %
HCO3 SERPL-SCNC: 27 MMOL/L (ref 22–29)
HCT VFR BLD AUTO: 44.8 % (ref 35–47)
HCT VFR BLD AUTO: 46.6 % (ref 35–47)
HGB BLD-MCNC: 14.1 G/DL (ref 11.7–15.7)
HGB BLD-MCNC: 14.8 G/DL (ref 11.7–15.7)
HOLD SPECIMEN: NORMAL
INTERPRETATION ECG - MUSE: NORMAL
MCH RBC QN AUTO: 28.5 PG (ref 26.5–33)
MCH RBC QN AUTO: 28.6 PG (ref 26.5–33)
MCHC RBC AUTO-ENTMCNC: 31.5 G/DL (ref 31.5–36.5)
MCHC RBC AUTO-ENTMCNC: 31.8 G/DL (ref 31.5–36.5)
MCV RBC AUTO: 90 FL (ref 78–100)
MCV RBC AUTO: 91 FL (ref 78–100)
P AXIS - MUSE: 43 DEGREES
PLATELET # BLD AUTO: 263 10E3/UL (ref 150–450)
PLATELET # BLD AUTO: 287 10E3/UL (ref 150–450)
POTASSIUM SERPL-SCNC: 3.8 MMOL/L (ref 3.4–5.3)
PR INTERVAL - MUSE: 164 MS
QRS DURATION - MUSE: 120 MS
QT - MUSE: 384 MS
QTC - MUSE: 446 MS
R AXIS - MUSE: -50 DEGREES
RBC # BLD AUTO: 4.94 10E6/UL (ref 3.8–5.2)
RBC # BLD AUTO: 5.17 10E6/UL (ref 3.8–5.2)
SODIUM SERPL-SCNC: 140 MMOL/L (ref 135–145)
SPECIMEN EXPIRATION DATE: NORMAL
SYSTOLIC BLOOD PRESSURE - MUSE: NORMAL MMHG
T AXIS - MUSE: 63 DEGREES
VENTRICULAR RATE- MUSE: 81 BPM
WBC # BLD AUTO: 8.4 10E3/UL (ref 4–11)
WBC # BLD AUTO: 9.5 10E3/UL (ref 4–11)

## 2024-10-23 PROCEDURE — 80048 BASIC METABOLIC PNL TOTAL CA: CPT | Performed by: INTERNAL MEDICINE

## 2024-10-23 PROCEDURE — 85027 COMPLETE CBC AUTOMATED: CPT | Performed by: INTERNAL MEDICINE

## 2024-10-23 PROCEDURE — C1887 CATHETER, GUIDING: HCPCS | Performed by: INTERNAL MEDICINE

## 2024-10-23 PROCEDURE — 93005 ELECTROCARDIOGRAM TRACING: CPT | Performed by: INTERNAL MEDICINE

## 2024-10-23 PROCEDURE — 93458 L HRT ARTERY/VENTRICLE ANGIO: CPT | Performed by: INTERNAL MEDICINE

## 2024-10-23 PROCEDURE — 250N000013 HC RX MED GY IP 250 OP 250 PS 637: Performed by: NURSE PRACTITIONER

## 2024-10-23 PROCEDURE — 86920 COMPATIBILITY TEST SPIN: CPT | Performed by: THORACIC SURGERY (CARDIOTHORACIC VASCULAR SURGERY)

## 2024-10-23 PROCEDURE — 250N000013 HC RX MED GY IP 250 OP 250 PS 637: Performed by: INTERNAL MEDICINE

## 2024-10-23 PROCEDURE — 36415 COLL VENOUS BLD VENIPUNCTURE: CPT | Performed by: INTERNAL MEDICINE

## 2024-10-23 PROCEDURE — 93010 ELECTROCARDIOGRAM REPORT: CPT | Performed by: STUDENT IN AN ORGANIZED HEALTH CARE EDUCATION/TRAINING PROGRAM

## 2024-10-23 PROCEDURE — 250N000009 HC RX 250: Performed by: INTERNAL MEDICINE

## 2024-10-23 PROCEDURE — 83036 HEMOGLOBIN GLYCOSYLATED A1C: CPT | Performed by: INTERNAL MEDICINE

## 2024-10-23 PROCEDURE — 99222 1ST HOSP IP/OBS MODERATE 55: CPT | Performed by: INTERNAL MEDICINE

## 2024-10-23 PROCEDURE — 93458 L HRT ARTERY/VENTRICLE ANGIO: CPT | Mod: 26 | Performed by: INTERNAL MEDICINE

## 2024-10-23 PROCEDURE — 86901 BLOOD TYPING SEROLOGIC RH(D): CPT | Performed by: INTERNAL MEDICINE

## 2024-10-23 PROCEDURE — 255N000002 HC RX 255 OP 636: Performed by: INTERNAL MEDICINE

## 2024-10-23 PROCEDURE — 86923 COMPATIBILITY TEST ELECTRIC: CPT | Performed by: THORACIC SURGERY (CARDIOTHORACIC VASCULAR SURGERY)

## 2024-10-23 PROCEDURE — B211YZZ FLUOROSCOPY OF MULTIPLE CORONARY ARTERIES USING OTHER CONTRAST: ICD-10-PCS | Performed by: INTERNAL MEDICINE

## 2024-10-23 PROCEDURE — 93005 ELECTROCARDIOGRAM TRACING: CPT

## 2024-10-23 PROCEDURE — 99152 MOD SED SAME PHYS/QHP 5/>YRS: CPT | Performed by: INTERNAL MEDICINE

## 2024-10-23 PROCEDURE — 4A023N7 MEASUREMENT OF CARDIAC SAMPLING AND PRESSURE, LEFT HEART, PERCUTANEOUS APPROACH: ICD-10-PCS | Performed by: INTERNAL MEDICINE

## 2024-10-23 PROCEDURE — 86900 BLOOD TYPING SEROLOGIC ABO: CPT | Performed by: INTERNAL MEDICINE

## 2024-10-23 PROCEDURE — C1894 INTRO/SHEATH, NON-LASER: HCPCS | Performed by: INTERNAL MEDICINE

## 2024-10-23 PROCEDURE — 120N000004 HC R&B MS OVERFLOW

## 2024-10-23 PROCEDURE — 250N000011 HC RX IP 250 OP 636: Performed by: INTERNAL MEDICINE

## 2024-10-23 PROCEDURE — 272N000001 HC OR GENERAL SUPPLY STERILE: Performed by: INTERNAL MEDICINE

## 2024-10-23 RX ORDER — FENTANYL CITRATE 50 UG/ML
INJECTION, SOLUTION INTRAMUSCULAR; INTRAVENOUS
Status: DISCONTINUED | OUTPATIENT
Start: 2024-10-23 | End: 2024-10-23

## 2024-10-23 RX ORDER — NICOTINE POLACRILEX 4 MG
15-30 LOZENGE BUCCAL
Status: DISCONTINUED | OUTPATIENT
Start: 2024-10-23 | End: 2024-10-25

## 2024-10-23 RX ORDER — ALPRAZOLAM 0.5 MG
0.5 TABLET ORAL 3 TIMES DAILY PRN
Status: DISCONTINUED | OUTPATIENT
Start: 2024-10-23 | End: 2024-10-25

## 2024-10-23 RX ORDER — ASPIRIN 325 MG
325 TABLET ORAL ONCE
Status: COMPLETED | OUTPATIENT
Start: 2024-10-23 | End: 2024-10-23

## 2024-10-23 RX ORDER — OMEPRAZOLE 20 MG/1
20 TABLET, DELAYED RELEASE ORAL
Status: DISCONTINUED | OUTPATIENT
Start: 2024-10-23 | End: 2024-10-25

## 2024-10-23 RX ORDER — NALOXONE HYDROCHLORIDE 0.4 MG/ML
0.4 INJECTION, SOLUTION INTRAMUSCULAR; INTRAVENOUS; SUBCUTANEOUS
Status: ACTIVE | OUTPATIENT
Start: 2024-10-23 | End: 2024-10-23

## 2024-10-23 RX ORDER — ASPIRIN 81 MG/1
81 TABLET ORAL DAILY
Status: DISCONTINUED | OUTPATIENT
Start: 2024-10-24 | End: 2024-10-25

## 2024-10-23 RX ORDER — METFORMIN HYDROCHLORIDE 500 MG/1
500 TABLET, EXTENDED RELEASE ORAL
Status: DISCONTINUED | OUTPATIENT
Start: 2024-10-23 | End: 2024-10-28

## 2024-10-23 RX ORDER — NICOTINE POLACRILEX 4 MG
15-30 LOZENGE BUCCAL
Status: DISCONTINUED | OUTPATIENT
Start: 2024-10-23 | End: 2024-10-23 | Stop reason: HOSPADM

## 2024-10-23 RX ORDER — FLUMAZENIL 0.1 MG/ML
0.2 INJECTION, SOLUTION INTRAVENOUS
Status: ACTIVE | OUTPATIENT
Start: 2024-10-23 | End: 2024-10-23

## 2024-10-23 RX ORDER — ONDANSETRON 2 MG/ML
4 INJECTION INTRAMUSCULAR; INTRAVENOUS EVERY 6 HOURS PRN
Status: DISCONTINUED | OUTPATIENT
Start: 2024-10-23 | End: 2024-10-25

## 2024-10-23 RX ORDER — OXYCODONE HYDROCHLORIDE 5 MG/1
5 TABLET ORAL EVERY 4 HOURS PRN
Status: DISCONTINUED | OUTPATIENT
Start: 2024-10-23 | End: 2024-10-25

## 2024-10-23 RX ORDER — CEPHALEXIN 500 MG/1
500 CAPSULE ORAL DAILY PRN
COMMUNITY

## 2024-10-23 RX ORDER — PANTOPRAZOLE SODIUM 40 MG/1
40 TABLET, DELAYED RELEASE ORAL EVERY EVENING
Status: DISCONTINUED | OUTPATIENT
Start: 2024-10-23 | End: 2024-10-23 | Stop reason: ALTCHOICE

## 2024-10-23 RX ORDER — FENTANYL CITRATE 50 UG/ML
25 INJECTION, SOLUTION INTRAMUSCULAR; INTRAVENOUS
Status: DISCONTINUED | OUTPATIENT
Start: 2024-10-23 | End: 2024-10-23

## 2024-10-23 RX ORDER — LISINOPRIL 5 MG/1
10 TABLET ORAL DAILY
Status: DISCONTINUED | OUTPATIENT
Start: 2024-10-23 | End: 2024-10-25

## 2024-10-23 RX ORDER — HEPARIN SODIUM 200 [USP'U]/100ML
INJECTION, SOLUTION INTRAVENOUS
Status: DISCONTINUED | OUTPATIENT
Start: 2024-10-23 | End: 2024-10-23

## 2024-10-23 RX ORDER — LIDOCAINE 40 MG/G
CREAM TOPICAL
Status: DISCONTINUED | OUTPATIENT
Start: 2024-10-23 | End: 2024-10-23 | Stop reason: HOSPADM

## 2024-10-23 RX ORDER — IODIXANOL 320 MG/ML
INJECTION, SOLUTION INTRAVASCULAR
Status: DISCONTINUED | OUTPATIENT
Start: 2024-10-23 | End: 2024-10-23

## 2024-10-23 RX ORDER — HYDRALAZINE HYDROCHLORIDE 20 MG/ML
10 INJECTION INTRAMUSCULAR; INTRAVENOUS
Status: COMPLETED | OUTPATIENT
Start: 2024-10-23 | End: 2024-10-23

## 2024-10-23 RX ORDER — DEXTROSE MONOHYDRATE 25 G/50ML
25-50 INJECTION, SOLUTION INTRAVENOUS
Status: DISCONTINUED | OUTPATIENT
Start: 2024-10-23 | End: 2024-10-23 | Stop reason: HOSPADM

## 2024-10-23 RX ORDER — ROSUVASTATIN CALCIUM 40 MG/1
40 TABLET, COATED ORAL DAILY
Qty: 90 TABLET | Refills: 3 | Status: SHIPPED | OUTPATIENT
Start: 2024-10-23 | End: 2024-10-31

## 2024-10-23 RX ORDER — LACTOBACILLUS RHAMNOSUS GG 10B CELL
1 CAPSULE ORAL
Status: DISCONTINUED | OUTPATIENT
Start: 2024-10-25 | End: 2024-10-25

## 2024-10-23 RX ORDER — ACETAMINOPHEN 325 MG/1
650 TABLET ORAL EVERY 4 HOURS PRN
Status: DISCONTINUED | OUTPATIENT
Start: 2024-10-23 | End: 2024-10-25

## 2024-10-23 RX ORDER — FENTANYL CITRATE 50 UG/ML
25 INJECTION, SOLUTION INTRAMUSCULAR; INTRAVENOUS
Status: DISCONTINUED | OUTPATIENT
Start: 2024-10-23 | End: 2024-10-23 | Stop reason: HOSPADM

## 2024-10-23 RX ORDER — ROSUVASTATIN CALCIUM 40 MG/1
40 TABLET, COATED ORAL DAILY
Status: DISCONTINUED | OUTPATIENT
Start: 2024-10-23 | End: 2024-10-31 | Stop reason: HOSPADM

## 2024-10-23 RX ORDER — DIAZEPAM 5 MG/1
5 TABLET ORAL ONCE
Status: COMPLETED | OUTPATIENT
Start: 2024-10-23 | End: 2024-10-23

## 2024-10-23 RX ORDER — VITAMIN E (DL,TOCOPHERYL ACET) 45 MG/0.25
DROPS ORAL
Status: DISCONTINUED | OUTPATIENT
Start: 2024-10-23 | End: 2024-10-23

## 2024-10-23 RX ORDER — HEPARIN SODIUM 10000 [USP'U]/100ML
0-5000 INJECTION, SOLUTION INTRAVENOUS CONTINUOUS
Status: DISCONTINUED | OUTPATIENT
Start: 2024-10-23 | End: 2024-10-25

## 2024-10-23 RX ORDER — ASPIRIN 81 MG/1
243 TABLET, CHEWABLE ORAL ONCE
Status: COMPLETED | OUTPATIENT
Start: 2024-10-23 | End: 2024-10-23

## 2024-10-23 RX ORDER — NALOXONE HYDROCHLORIDE 0.4 MG/ML
0.2 INJECTION, SOLUTION INTRAMUSCULAR; INTRAVENOUS; SUBCUTANEOUS
Status: ACTIVE | OUTPATIENT
Start: 2024-10-23 | End: 2024-10-23

## 2024-10-23 RX ORDER — NITROGLYCERIN 0.4 MG/1
0.4 TABLET SUBLINGUAL EVERY 5 MIN PRN
Status: DISCONTINUED | OUTPATIENT
Start: 2024-10-23 | End: 2024-10-25

## 2024-10-23 RX ORDER — SODIUM CHLORIDE 9 MG/ML
INJECTION, SOLUTION INTRAVENOUS CONTINUOUS
Status: DISCONTINUED | OUTPATIENT
Start: 2024-10-23 | End: 2024-10-23 | Stop reason: HOSPADM

## 2024-10-23 RX ORDER — LISINOPRIL 5 MG/1
10 TABLET ORAL DAILY
Status: DISCONTINUED | OUTPATIENT
Start: 2024-10-24 | End: 2024-10-23

## 2024-10-23 RX ORDER — SODIUM CHLORIDE 9 MG/ML
75 INJECTION, SOLUTION INTRAVENOUS CONTINUOUS
Status: ACTIVE | OUTPATIENT
Start: 2024-10-23 | End: 2024-10-23

## 2024-10-23 RX ORDER — OXYCODONE HYDROCHLORIDE 5 MG/1
10 TABLET ORAL EVERY 4 HOURS PRN
Status: DISCONTINUED | OUTPATIENT
Start: 2024-10-23 | End: 2024-10-25

## 2024-10-23 RX ORDER — PANTOPRAZOLE SODIUM 40 MG/1
40 TABLET, DELAYED RELEASE ORAL
Status: CANCELLED | OUTPATIENT
Start: 2024-10-24

## 2024-10-23 RX ORDER — LANOLIN ALCOHOL/MO/W.PET/CERES
1000 CREAM (GRAM) TOPICAL DAILY
Status: DISCONTINUED | OUTPATIENT
Start: 2024-10-24 | End: 2024-10-31 | Stop reason: HOSPADM

## 2024-10-23 RX ORDER — DEXTROSE MONOHYDRATE 25 G/50ML
25-50 INJECTION, SOLUTION INTRAVENOUS
Status: DISCONTINUED | OUTPATIENT
Start: 2024-10-23 | End: 2024-10-25

## 2024-10-23 RX ORDER — HYDRALAZINE HYDROCHLORIDE 20 MG/ML
10 INJECTION INTRAMUSCULAR; INTRAVENOUS EVERY 6 HOURS PRN
Status: DISCONTINUED | OUTPATIENT
Start: 2024-10-23 | End: 2024-10-25

## 2024-10-23 RX ORDER — ATROPINE SULFATE 0.1 MG/ML
0.5 INJECTION INTRAVENOUS
Status: ACTIVE | OUTPATIENT
Start: 2024-10-23 | End: 2024-10-23

## 2024-10-23 RX ORDER — ALBUTEROL SULFATE 90 UG/1
2 INHALANT RESPIRATORY (INHALATION) EVERY 6 HOURS PRN
Status: DISCONTINUED | OUTPATIENT
Start: 2024-10-23 | End: 2024-10-25

## 2024-10-23 RX ADMIN — HEPARIN SODIUM 850 UNITS/HR: 10000 INJECTION, SOLUTION INTRAVENOUS at 19:16

## 2024-10-23 RX ADMIN — LISINOPRIL 10 MG: 5 TABLET ORAL at 18:04

## 2024-10-23 RX ADMIN — ALPRAZOLAM 0.5 MG: 0.5 TABLET ORAL at 17:28

## 2024-10-23 RX ADMIN — HYDRALAZINE HYDROCHLORIDE 10 MG: 20 INJECTION INTRAMUSCULAR; INTRAVENOUS at 17:28

## 2024-10-23 RX ADMIN — ROSUVASTATIN CALCIUM 40 MG: 40 TABLET, FILM COATED ORAL at 19:31

## 2024-10-23 RX ADMIN — ACETAMINOPHEN 650 MG: 325 TABLET, FILM COATED ORAL at 21:13

## 2024-10-23 RX ADMIN — DIAZEPAM 5 MG: 5 TABLET ORAL at 13:01

## 2024-10-23 RX ADMIN — OMEPRAZOLE 20 MG: 20 TABLET, DELAYED RELEASE ORAL at 19:30

## 2024-10-23 ASSESSMENT — ACTIVITIES OF DAILY LIVING (ADL)
CONCENTRATING,_REMEMBERING_OR_MAKING_DECISIONS_DIFFICULTY: NO
ADLS_ACUITY_SCORE: 0
ADLS_ACUITY_SCORE: 9.75
DIFFICULTY_COMMUNICATING: NO
ADLS_ACUITY_SCORE: 0
ADLS_ACUITY_SCORE: 9.75
ADLS_ACUITY_SCORE: 9.75
HEARING_DIFFICULTY_OR_DEAF: NO
ADLS_ACUITY_SCORE: 9.75
VISION_MANAGEMENT: GLASSES
DOING_ERRANDS_INDEPENDENTLY_DIFFICULTY: NO
CHANGE_IN_FUNCTIONAL_STATUS_SINCE_ONSET_OF_CURRENT_ILLNESS/INJURY: YES
ADLS_ACUITY_SCORE: 9.75
ADLS_ACUITY_SCORE: 0
FALL_HISTORY_WITHIN_LAST_SIX_MONTHS: NO
WALKING_OR_CLIMBING_STAIRS_DIFFICULTY: NO
TOILETING_ISSUES: NO
DRESSING/BATHING_DIFFICULTY: NO
ADLS_ACUITY_SCORE: 0
WEAR_GLASSES_OR_BLIND: YES
ADLS_ACUITY_SCORE: 9.75
DIFFICULTY_EATING/SWALLOWING: NO
ADLS_ACUITY_SCORE: 9.75

## 2024-10-23 ASSESSMENT — EJECTION FRACTION: EF_VALUE: .2

## 2024-10-23 NOTE — CONSULTS
"[facility]  Consult Note - Hospitalist Service  Date of Admission:  10/23/2024  Consult Requested by: Cardiology  Reason for Consult: Medical management    Assessment & Plan     69 year old old female with HTN, HL, DM2, accelerating angina who had an abnormal stress test leading to a coronary angiogram today.  Coronary angiogram demonstrated multi-vessel CAD.  CV surgery consulted.     Multi-vessel CAD  -coronary angiogram completed today which showed:   LM: no obstruction  LAD:proximal 95% narrowing  Lcx:OM1 70%, OM3 70% narrowing  RCA:dominant, 30% proximal, 70% rPDA narrowing  -ASA 81mg every day, Crestor increased to 40mg every day, Lisinopril  -Heparin gtt per protocol  -CV surgery consulted    HTN  -Lisinopril    DM 2  -Hold Metformin  -sliding scale insulin, glucose checks    HLD  -Crestor increased to 40mg every day    Mild intermittent asthma w/o acute exacerbation  -albuterol inhaler prn    GERD  -PPI    Anxiety d/o  -alprazolam prn       Clinically Significant Risk Factors Present on Admission                 # Drug Induced Platelet Defect: home medication list includes an antiplatelet medication   # Hypertension: Home medication list includes antihypertensive(s)         # Overweight: Estimated body mass index is 25.66 kg/m  as calculated from the following:    Height as of this encounter: 1.676 m (5' 6\").    Weight as of this encounter: 72.1 kg (159 lb).       # Asthma: noted on problem list        Delbert Gonzalez DO, DO  Hospitalist Service  Securely message with KartMe (more info)  Text page via [facility] Paging/Directory   ______________________________________________________________________    Chief Complaint   Post-procedural medical management    History of Present Illness   Holly Beal is a 69 year old old female with HTN, HL, DM2, accelerating angina who had an abnormal stress test leading to a coronary angiogram today.  Coronary angiogram demonstrated multi-vessel " CAD. CV surgery has been consulted.  Patient's BP's have been running high post angiogram. Some anxiousness, and mild headache w/o any associated acute neurological changes. Denies chest pain, sob, abdominal pain, n/v. No vision changes. No palpitations, lightheadedness or dizziness.    Past Medical History    Past Medical History:   Diagnosis Date    Asthma     Diabetes mellitus, type 2 (H)        Past Surgical History   Past Surgical History:   Procedure Laterality Date     SECTION  ,88    IR NEPHROLITHOTOMY  10/22/2015    OTHER SURGICAL HISTORY      Esophagus line surgeryremoval of guide wire       Medications   Await PharmD to complete med rec    Physical Exam   Vital Signs: Temp: 98.6  F (37  C) Temp src: Oral BP: (!) 183/80 Pulse: 79   Resp: 18 SpO2: 100 % O2 Device: None (Room air) Oxygen Delivery: 2 LPM  Weight: 159 lbs 0 oz    General appearance - alert, and in no distress  Eyes - pupils equal and reactive, extraocular eye movements intact, sclera anicteric  Lungs - clear to auscultation, no wheezes, rales or rhonchi, symmetric air entry  Heart - normal rate, regular rhythm, normal S1, S2, no murmurs, rubs, clicks or gallops. No peripheral edema.Right radial artery access site mild edema and bruising, neurovasc function intact to right hand.  Abdomen - soft, nontender, nondistended, BS+  Neurological - alert, oriented, normal speech, no focal findings or movement disorder noted      Lab/imaging reviewed

## 2024-10-23 NOTE — PROGRESS NOTES
Patient is kept comfortable during post-procedure stay. Denies pain. Right radial access site remains dry & free from signs of bleeding. Tolerated food and fluids. Transferred from bed to wheelchair without issues. Dr. Pendleton was able to speak with patient and spouse post procedure. Able to ask questions. Verbalized no concerns. Belongings brought up to patient's room.  Transferred to P3 in stable condition. Report given to Sandie MAYER in Unit P3.

## 2024-10-23 NOTE — PHARMACY-ADMISSION MEDICATION HISTORY
Pharmacist Admission Medication History    Admission medication history is complete. The information provided in this note is only as accurate as the sources available at the time of the update.    Information Source(s): Patient, Family member, Clinic records, and CareEverywhere/SureScripts via in-person    Pertinent Information: none    Changes made to PTA medication list:  Added: cephalexin  Deleted: pravastatin  Changed: None    Allergies reviewed with patient and updates made in EHR: yes    Medication History Completed By: Ricki Murphy RPH 10/23/2024 5:28 PM    PTA Med List   Medication Sig Last Dose/Taking    albuterol (PROVENTIL HFA;VENTOLIN HFA) 90 mcg/actuation inhaler [ALBUTEROL (PROVENTIL HFA;VENTOLIN HFA) 90 MCG/ACTUATION INHALER] Inhale 2 puffs every 6 (six) hours as needed for wheezing or shortness of breath. More than a month    Alpha Lipoic Ac-Biotin-Keratin (BIOTIN-KERATIN-ALPHA LIPOIC AC PO) Take 1 capsule by mouth daily. 10/22/2024    ALPRAZolam (XANAX) 0.5 MG tablet [ALPRAZOLAM (XANAX) 0.5 MG TABLET] Take 0.5 mg by mouth 3 (three) times a day as needed for anxiety. Past Week    aspirin 81 MG EC tablet [ASPIRIN 81 MG EC TABLET] Take 81 mg by mouth daily. 10/23/2024    AZO-CRANBERRY PO Take 1 tablet by mouth three times a week. 10/22/2024    cephALEXin (KEFLEX) 500 MG capsule Take 500 mg by mouth daily as needed (as needed for kidney stones). Taking As Needed    cyanocobalamin 1000 MCG tablet [CYANOCOBALAMIN 1000 MCG TABLET] Take 1,000 mcg by mouth daily. 10/22/2024    fluticasone (FLONASE) 50 mcg/actuation nasal spray [FLUTICASONE (FLONASE) 50 MCG/ACTUATION NASAL SPRAY] 2 sprays into each nostril daily as needed for rhinitis. 10/22/2024    lisinopril (PRINIVIL,ZESTRIL) 10 MG tablet Take 10 mg by mouth daily. 10/23/2024    metFORMIN (GLUCOPHAGE XR) 500 MG 24 hr tablet Take 500 mg by mouth daily (with dinner). Past Week    nitroGLYcerin (NITROSTAT) 0.4 MG sublingual tablet For chest pain place 1  tablet under the tongue every 5 minutes for 3 doses. If symptoms persist 5 minutes after 1st dose call 911. Unknown    omeprazole (PRILOSEC) 20 MG capsule [OMEPRAZOLE (PRILOSEC) 20 MG CAPSULE] Take 20 mg by mouth every evening.  10/22/2024    Probiotic Product (PROBIOTIC 10 ULTRA STRENGTH PO) Take 1 capsule by mouth three times a week. Alternates  with AZO Cranberry. Past Week    rosuvastatin (CRESTOR) 10 MG tablet Take 1 tablet (10 mg) by mouth daily. 10/22/2024 at  9:00 PM    rosuvastatin (CRESTOR) 40 MG tablet Take 1 tablet (40 mg) by mouth daily. Taking

## 2024-10-23 NOTE — PLAN OF CARE
Goal Outcome Evaluation:         Pt admitted for CA/P.PCi due to PATINO and chest pain. Pt prepped and ready for procedure. Pt's  JESSICA is here for support.      Zoila Olmedo RN

## 2024-10-23 NOTE — DISCHARGE INSTRUCTIONS
Interventional Cardiology  Coronary Angiogram/Angioplasty/Stent/Atherectomy Discharge  Instructions - Radial (wrist) Approach   The instructions below are to help you understand how to take care of yourself. There is also information about when to call the doctor or emergency services.  **Do not stop your aspirin or platelet inhibitor unless directed by your Cardiologist.  These medications elp to prevent platelets in your blood from sticking together and forming a clot.   Examples of these medications are: Ticagrelor (Brilinta), Clopidogrel (Plavix), Prasugrel (Effient)    For 24 hours after procedure:  Do not subject hand/arm to any forceful movements for 24 hours, such as supporting weight when rising from a chair or bed.  Do not drive a car for 24 hours.  The dressing on the puncture site may be removed after 24 hours and left open to air. If minor oozing, you may apply a Band-Aid and remove after 12 hours.   You may shower on the day after your procedure. Do not take a tub bath or wash dishes (no soaking wrist) with the puncture site in water for 3 days after the procedure.  For 48 hours following the procedure:  Do not operate a lawnmower, motorcycle, chainsaw or all-terrain vehicle.  Do not lift anything heavier than 5-10 pounds with affected arm for 5 days.  Avoid excessive bending (flexion/extension) wrist movement.  Do not engage in vigorous exercise (i.e. tennis, golf) using the affected arm for 5 days after discharge.  You may return to work in 72 hours if no complications and no heavy lifting.  If bleeding should occur following discharge:  Sit down and apply firm pressure with your thumb against the puncture site and fingers against back of wrist for 10 minutes.  If the bleeding stops, continue to rest, keeping your wrist still for 2 hours. Notify your doctor as soon as possible.  If bleeding does not stop after 10 minutes or if there is a large amount of bleeding or spurting, call 911 immediately. Do  not drive yourself to the hospital.         Contact the Heart Clinic at [phone] if you develop:  Fever over 100.4, that lasts more than one day  Redness, heat, or pus at the puncture site  Change in color or temperature of the hand or arm    Expect mild tingling of hand and tenderness at the puncture site for up to 3 days. You may take Tylenol or a pain medicine recommended by your doctor.                       Our Cardiac Rehab staff may visit briefly with you while your in the hospital.   If they miss you, someone will contact you after you are home.  You are encouraged to enroll in an Outpatient Cardiac Rehab Program     Your Procedural Physician was: Dr. Pendleton, the phone number is: [phone].    [facility]:  [phone]  If you are calling after hours, please listen to the entire voicemail, a live  will answer at the end of the message

## 2024-10-23 NOTE — PRE-PROCEDURE
GENERAL PRE-PROCEDURE:   Procedure:  Coronary angiogram with possible PCI  Date/Time:  10/23/2024 12:51 PM    Written consent obtained?: Yes    Risks and benefits: Risks, benefits and alternatives were discussed    Consent given by:  Patient  Patient states understanding of procedure being performed: Yes    Patient's understanding of procedure matches consent: Yes    Procedure consent matches procedure scheduled: Yes    Expected level of sedation:  Moderate  Appropriately NPO:  Yes  ASA Class:  3 (abnormal stress test, chest pain, dyslipidemia, DM Type II)  Mallampati  :  Grade 2- soft palate, base of uvula, tonsillar pillars, and portion of posterior pharyngeal wall visible  Lungs:  Lungs clear with good breath sounds bilaterally  Heart:  Normal heart sounds and rate  History & Physical reviewed:  History and physical reviewed and updates made (see comment)  H&P Comments:  Clinically Significant Risk Factors Present on Admission    Cardiovascular : abnormal stress test, chest pain, dyslipidemia, DM Type II    Fluid & Electrolyte Disorders : Not present on admission    Gastroenterology : Not present on admission    Hematology/Oncology : Not present on admission    Nephrology : Not present on admission    Neurology : Not present on admission    Pulmonology : Not present on admission    Systemic : Not present on admission    Statement of review:  I have reviewed the lab findings, diagnostic data, medications, and the plan for sedation

## 2024-10-23 NOTE — INTERVAL H&P NOTE
"I have reviewed the surgical (or preoperative) H&P that is linked to this encounter, and examined the patient. There are no significant changes    Clinical Conditions Present on Arrival:  Clinically Significant Risk Factors Present on Admission                  # Drug Induced Platelet Defect: home medication list includes an antiplatelet medication      # Overweight: Estimated body mass index is 25.66 kg/m  as calculated from the following:    Height as of this encounter: 1.676 m (5' 6\").    Weight as of this encounter: 72.1 kg (159 lb).       "

## 2024-10-24 ENCOUNTER — APPOINTMENT (OUTPATIENT)
Dept: ULTRASOUND IMAGING | Facility: HOSPITAL | Age: 69
DRG: 234 | End: 2024-10-24
Attending: PHYSICIAN ASSISTANT
Payer: COMMERCIAL

## 2024-10-24 ENCOUNTER — ANESTHESIA EVENT (OUTPATIENT)
Dept: SURGERY | Facility: HOSPITAL | Age: 69
DRG: 234 | End: 2024-10-24
Payer: COMMERCIAL

## 2024-10-24 LAB
ALBUMIN SERPL BCG-MCNC: 4.1 G/DL (ref 3.5–5.2)
ALBUMIN UR-MCNC: NEGATIVE MG/DL
ALP SERPL-CCNC: 67 U/L (ref 40–150)
ALT SERPL W P-5'-P-CCNC: 12 U/L (ref 0–50)
ANION GAP SERPL CALCULATED.3IONS-SCNC: 10 MMOL/L (ref 7–15)
APPEARANCE UR: CLEAR
APTT PPP: 57 SECONDS (ref 22–38)
AST SERPL W P-5'-P-CCNC: 16 U/L (ref 0–45)
BACTERIA #/AREA URNS HPF: ABNORMAL /HPF
BASOPHILS # BLD AUTO: 0.1 10E3/UL (ref 0–0.2)
BASOPHILS NFR BLD AUTO: 1 %
BILIRUB SERPL-MCNC: 1.1 MG/DL
BILIRUB UR QL STRIP: NEGATIVE
BLD PROD TYP BPU: NORMAL
BLD PROD TYP BPU: NORMAL
BLOOD COMPONENT TYPE: NORMAL
BLOOD COMPONENT TYPE: NORMAL
BUN SERPL-MCNC: 10.3 MG/DL (ref 8–23)
CALCIUM SERPL-MCNC: 9.4 MG/DL (ref 8.8–10.4)
CHLORIDE SERPL-SCNC: 107 MMOL/L (ref 98–107)
CODING SYSTEM: NORMAL
CODING SYSTEM: NORMAL
COLOR UR AUTO: COLORLESS
CREAT SERPL-MCNC: 0.77 MG/DL (ref 0.51–0.95)
CROSSMATCH: NORMAL
CROSSMATCH: NORMAL
EGFRCR SERPLBLD CKD-EPI 2021: 84 ML/MIN/1.73M2
EOSINOPHIL # BLD AUTO: 0.2 10E3/UL (ref 0–0.7)
EOSINOPHIL NFR BLD AUTO: 2 %
ERYTHROCYTE [DISTWIDTH] IN BLOOD BY AUTOMATED COUNT: 11.9 % (ref 10–15)
GLUCOSE BLDC GLUCOMTR-MCNC: 130 MG/DL (ref 70–99)
GLUCOSE BLDC GLUCOMTR-MCNC: 137 MG/DL (ref 70–99)
GLUCOSE BLDC GLUCOMTR-MCNC: 144 MG/DL (ref 70–99)
GLUCOSE BLDC GLUCOMTR-MCNC: 160 MG/DL (ref 70–99)
GLUCOSE SERPL-MCNC: 121 MG/DL (ref 70–99)
GLUCOSE UR STRIP-MCNC: NEGATIVE MG/DL
HCO3 SERPL-SCNC: 27 MMOL/L (ref 22–29)
HCT VFR BLD AUTO: 42.8 % (ref 35–47)
HGB BLD-MCNC: 13.8 G/DL (ref 11.7–15.7)
HGB UR QL STRIP: NEGATIVE
IMM GRANULOCYTES # BLD: 0 10E3/UL
IMM GRANULOCYTES NFR BLD: 0 %
INR PPP: 1.06 (ref 0.85–1.15)
ISSUE DATE AND TIME: NORMAL
ISSUE DATE AND TIME: NORMAL
KETONES UR STRIP-MCNC: 40 MG/DL
LEUKOCYTE ESTERASE UR QL STRIP: NEGATIVE
LYMPHOCYTES # BLD AUTO: 2.7 10E3/UL (ref 0.8–5.3)
LYMPHOCYTES NFR BLD AUTO: 30 %
MAGNESIUM SERPL-MCNC: 2 MG/DL (ref 1.7–2.3)
MCH RBC QN AUTO: 29.1 PG (ref 26.5–33)
MCHC RBC AUTO-ENTMCNC: 32.2 G/DL (ref 31.5–36.5)
MCV RBC AUTO: 90 FL (ref 78–100)
MONOCYTES # BLD AUTO: 0.7 10E3/UL (ref 0–1.3)
MONOCYTES NFR BLD AUTO: 8 %
MUCOUS THREADS #/AREA URNS LPF: PRESENT /LPF
NEUTROPHILS # BLD AUTO: 5.4 10E3/UL (ref 1.6–8.3)
NEUTROPHILS NFR BLD AUTO: 59 %
NITRATE UR QL: NEGATIVE
NRBC # BLD AUTO: 0 10E3/UL
NRBC BLD AUTO-RTO: 0 /100
PH UR STRIP: 6.5 [PH] (ref 5–7)
PLATELET # BLD AUTO: 246 10E3/UL (ref 150–450)
POTASSIUM SERPL-SCNC: 4 MMOL/L (ref 3.4–5.3)
PREALB SERPL-MCNC: 18.7 MG/DL (ref 20–40)
PROT SERPL-MCNC: 6.7 G/DL (ref 6.4–8.3)
RBC # BLD AUTO: 4.75 10E6/UL (ref 3.8–5.2)
RBC URINE: 1 /HPF
SODIUM SERPL-SCNC: 144 MMOL/L (ref 135–145)
SP GR UR STRIP: 1.01 (ref 1–1.03)
SQUAMOUS EPITHELIAL: 1 /HPF
UFH PPP CHRO-ACNC: 0.43 IU/ML
UFH PPP CHRO-ACNC: 0.47 IU/ML
UNIT ABO/RH: NORMAL
UNIT ABO/RH: NORMAL
UNIT NUMBER: NORMAL
UNIT NUMBER: NORMAL
UNIT STATUS: NORMAL
UNIT STATUS: NORMAL
UNIT TYPE ISBT: 6200
UNIT TYPE ISBT: 6200
UROBILINOGEN UR STRIP-MCNC: <2 MG/DL
WBC # BLD AUTO: 9.1 10E3/UL (ref 4–11)
WBC URINE: 3 /HPF

## 2024-10-24 PROCEDURE — 120N000004 HC R&B MS OVERFLOW

## 2024-10-24 PROCEDURE — 36415 COLL VENOUS BLD VENIPUNCTURE: CPT | Performed by: STUDENT IN AN ORGANIZED HEALTH CARE EDUCATION/TRAINING PROGRAM

## 2024-10-24 PROCEDURE — 85025 COMPLETE CBC W/AUTO DIFF WBC: CPT | Performed by: INTERNAL MEDICINE

## 2024-10-24 PROCEDURE — 999N000248 HC STATISTIC IV INSERT WITH US BY RN

## 2024-10-24 PROCEDURE — 83735 ASSAY OF MAGNESIUM: CPT | Performed by: THORACIC SURGERY (CARDIOTHORACIC VASCULAR SURGERY)

## 2024-10-24 PROCEDURE — 99232 SBSQ HOSP IP/OBS MODERATE 35: CPT | Mod: FS | Performed by: INTERNAL MEDICINE

## 2024-10-24 PROCEDURE — 85610 PROTHROMBIN TIME: CPT | Performed by: THORACIC SURGERY (CARDIOTHORACIC VASCULAR SURGERY)

## 2024-10-24 PROCEDURE — 250N000011 HC RX IP 250 OP 636: Performed by: INTERNAL MEDICINE

## 2024-10-24 PROCEDURE — 93880 EXTRACRANIAL BILAT STUDY: CPT

## 2024-10-24 PROCEDURE — 250N000013 HC RX MED GY IP 250 OP 250 PS 637: Performed by: INTERNAL MEDICINE

## 2024-10-24 PROCEDURE — 99223 1ST HOSP IP/OBS HIGH 75: CPT | Mod: FS | Performed by: THORACIC SURGERY (CARDIOTHORACIC VASCULAR SURGERY)

## 2024-10-24 PROCEDURE — 85520 HEPARIN ASSAY: CPT | Performed by: STUDENT IN AN ORGANIZED HEALTH CARE EDUCATION/TRAINING PROGRAM

## 2024-10-24 PROCEDURE — 36415 COLL VENOUS BLD VENIPUNCTURE: CPT | Performed by: INTERNAL MEDICINE

## 2024-10-24 PROCEDURE — 80053 COMPREHEN METABOLIC PANEL: CPT | Performed by: INTERNAL MEDICINE

## 2024-10-24 PROCEDURE — 250N000013 HC RX MED GY IP 250 OP 250 PS 637: Performed by: STUDENT IN AN ORGANIZED HEALTH CARE EDUCATION/TRAINING PROGRAM

## 2024-10-24 PROCEDURE — 87641 MR-STAPH DNA AMP PROBE: CPT | Performed by: STUDENT IN AN ORGANIZED HEALTH CARE EDUCATION/TRAINING PROGRAM

## 2024-10-24 PROCEDURE — 99233 SBSQ HOSP IP/OBS HIGH 50: CPT | Performed by: STUDENT IN AN ORGANIZED HEALTH CARE EDUCATION/TRAINING PROGRAM

## 2024-10-24 PROCEDURE — 99207 PR APP CREDIT; MD BILLING SHARED VISIT: CPT | Performed by: STUDENT IN AN ORGANIZED HEALTH CARE EDUCATION/TRAINING PROGRAM

## 2024-10-24 PROCEDURE — 81001 URINALYSIS AUTO W/SCOPE: CPT | Performed by: THORACIC SURGERY (CARDIOTHORACIC VASCULAR SURGERY)

## 2024-10-24 PROCEDURE — 84134 ASSAY OF PREALBUMIN: CPT | Performed by: THORACIC SURGERY (CARDIOTHORACIC VASCULAR SURGERY)

## 2024-10-24 PROCEDURE — 85730 THROMBOPLASTIN TIME PARTIAL: CPT | Performed by: THORACIC SURGERY (CARDIOTHORACIC VASCULAR SURGERY)

## 2024-10-24 RX ORDER — NALOXONE HYDROCHLORIDE 0.4 MG/ML
0.4 INJECTION, SOLUTION INTRAMUSCULAR; INTRAVENOUS; SUBCUTANEOUS
Status: DISCONTINUED | OUTPATIENT
Start: 2024-10-24 | End: 2024-10-25

## 2024-10-24 RX ORDER — NALOXONE HYDROCHLORIDE 0.4 MG/ML
0.2 INJECTION, SOLUTION INTRAMUSCULAR; INTRAVENOUS; SUBCUTANEOUS
Status: DISCONTINUED | OUTPATIENT
Start: 2024-10-24 | End: 2024-10-25

## 2024-10-24 RX ORDER — CHLORHEXIDINE GLUCONATE ORAL RINSE 1.2 MG/ML
10 SOLUTION DENTAL ONCE
Status: DISCONTINUED | OUTPATIENT
Start: 2024-10-25 | End: 2024-10-25

## 2024-10-24 RX ORDER — METHADONE HYDROCHLORIDE 10 MG/ML
20 INJECTION, SOLUTION INTRAMUSCULAR; INTRAVENOUS; SUBCUTANEOUS ONCE
Status: COMPLETED | OUTPATIENT
Start: 2024-10-24 | End: 2024-10-25

## 2024-10-24 RX ORDER — CARVEDILOL 6.25 MG/1
6.25 TABLET ORAL 2 TIMES DAILY WITH MEALS
Status: DISCONTINUED | OUTPATIENT
Start: 2024-10-24 | End: 2024-10-25

## 2024-10-24 RX ORDER — ASPIRIN 81 MG/1
81 TABLET, CHEWABLE ORAL
Status: DISCONTINUED | OUTPATIENT
Start: 2024-10-25 | End: 2024-10-25

## 2024-10-24 RX ORDER — ASPIRIN 81 MG/1
162 TABLET, CHEWABLE ORAL
Status: DISCONTINUED | OUTPATIENT
Start: 2024-10-25 | End: 2024-10-25

## 2024-10-24 RX ADMIN — CARVEDILOL 6.25 MG: 3.12 TABLET, FILM COATED ORAL at 13:14

## 2024-10-24 RX ADMIN — ACETAMINOPHEN 650 MG: 325 TABLET, FILM COATED ORAL at 03:21

## 2024-10-24 RX ADMIN — ACETAMINOPHEN 650 MG: 325 TABLET, FILM COATED ORAL at 14:08

## 2024-10-24 RX ADMIN — CARVEDILOL 6.25 MG: 3.12 TABLET, FILM COATED ORAL at 17:26

## 2024-10-24 RX ADMIN — ASPIRIN 81 MG: 81 TABLET, COATED ORAL at 08:37

## 2024-10-24 RX ADMIN — HEPARIN SODIUM 850 UNITS/HR: 10000 INJECTION, SOLUTION INTRAVENOUS at 19:57

## 2024-10-24 RX ADMIN — OMEPRAZOLE 20 MG: 20 TABLET, DELAYED RELEASE ORAL at 17:26

## 2024-10-24 RX ADMIN — ALPRAZOLAM 0.5 MG: 0.5 TABLET ORAL at 20:23

## 2024-10-24 RX ADMIN — LISINOPRIL 10 MG: 5 TABLET ORAL at 08:37

## 2024-10-24 RX ADMIN — CYANOCOBALAMIN TAB 1000 MCG 1000 MCG: 1000 TAB at 08:37

## 2024-10-24 RX ADMIN — ROSUVASTATIN CALCIUM 40 MG: 40 TABLET, FILM COATED ORAL at 20:23

## 2024-10-24 ASSESSMENT — ACTIVITIES OF DAILY LIVING (ADL)
ADLS_ACUITY_SCORE: 0
ADLS_ACUITY_SCORE: 0
ADLS_ACUITY_SCORE: 9.75
ADLS_ACUITY_SCORE: 10.25
ADLS_ACUITY_SCORE: 0
ADLS_ACUITY_SCORE: 10.25
ADLS_ACUITY_SCORE: 0
ADLS_ACUITY_SCORE: 9.75
ADLS_ACUITY_SCORE: 0
ADLS_ACUITY_SCORE: 0
ADLS_ACUITY_SCORE: 10.25
ADLS_ACUITY_SCORE: 0
ADLS_ACUITY_SCORE: 10.25

## 2024-10-24 NOTE — PLAN OF CARE
Assumed care 2300 to 0730. A&O x 4. Independent. Tele is NSR. C/O headache pain, PRN Tylenol given (see MAR). Room air. Up to toilet. Heparin gtt @ 850 units/hr. NPO @ 0000. Call light within reach, able to make needs known.     Problem: Cardiac Catheterization (Diagnostic/Interventional)  Goal: Absence of Vascular Access Complication  Intervention: Prevent and Manage Access Complications  Recent Flowsheet Documentation  Taken 10/23/2024 2325 by Sandie Julien RN  Activity Management: activity adjusted per tolerance  Head of Bed (HOB) Positioning: HOB at 20-30 degrees       Problem: Pain Acute  Goal: Optimal Pain Control and Function  Intervention: Prevent or Manage Pain  Recent Flowsheet Documentation  Taken 10/23/2024 2325 by Sandie Julien RN  Sensory Stimulation Regulation: quiet environment promoted  Medication Review/Management: medications reviewed

## 2024-10-24 NOTE — CONSULTS
Cardiothoracic Surgery Consult Note    Consult Reason: MV CAD    HPI: Holly Beal is a 70 y/o f with PMH of diabetes mellitus type 2, dyslipidemia, anxiety/depression presented with abnormal stress test resulting in cor angio showing proximal LAD 95%, OM1 70%, OM3 70%, rPDA 70% blockages. Patient otherwise active and healthy.      Imaging:  Coronary angiogram 10/23/24:     Findings:  LM:no obstruction  LAD:proximal 95% narrowing  Lcx:OM1 70%, OM3 70% narrowing  RCA:dominant, 30% proximal, 70% rPDA narrowing     LVEDP:10     Access:  R Radial artery     Closure:   Vasc Band    TTE completed 10/21/24: no results in chart  Stress Echo 10/17/24 showed LVEF 60-65%, no resting wall motion abnormalities, aortic valve sclerosis, MAC    A/P:      - OR scheduled for 10/25 for CABG x4 with Dr. Fuentes. With potential radial graft harvest  - CABG Imaging: carotid US ordered. Other imaging completed  - On Heparin gtt. Please stop at 5am(ordered)  - A1C 7.1  - Very hypertensive last 24 hours, please try to better control prior to surgery  - Thank you for the opportunity to participate in the care of this patient.    Patient and plan discussed with attending, Dr. Gina Fitzgerald PA-C   Cardiothoracic Surgery   2024 at 8:34 AM  Please reach me on vocera or secure chat    PERIOPERATIVE OUTCOME ESTIMATE %  Operative Mortality 0.798%  Morbidity & Mortality 4.1%  Stroke 1.24%  Renal Failure 0.404%  Reoperation 1.77%  Prolonged Ventilation 1.95%  Deep Sternal Wound Infection 0.198%  Long Hospital Stay (>14 days) 2.24%  Short Hospital Stay (<6 days) 55.5%        PMH:  Past Medical History:   Diagnosis Date    Asthma     Diabetes mellitus, type 2 (H)      PSH:  Past Surgical History:   Procedure Laterality Date     SECTION  ,    IR NEPHROLITHOTOMY  10/22/2015    OTHER SURGICAL HISTORY      Esophagus line surgeryremoval of guide wire     Past Surgical History:   Procedure Laterality Date      SECTION  1986,88    IR NEPHROLITHOTOMY  10/22/2015    OTHER SURGICAL HISTORY      Esophagus line surgeryremoval of guide wire     FH:  History reviewed. No pertinent family history.  SH:  Social History     Socioeconomic History    Marital status: Single   Tobacco Use    Smoking status: Former     Current packs/day: 0.00     Average packs/day: 0.5 packs/day for 10.0 years (5.0 ttl pk-yrs)     Types: Cigars, Cigarettes     Start date: 2010     Quit date: 2020     Years since quittin.7    Smokeless tobacco: Former    Tobacco comments:     Cigar every once in a while per pt   Substance and Sexual Activity    Alcohol use: Yes     Comment: 1 glass of wide a night    Drug use: No    Sexual activity: Yes     Partners: Female     Social Determinants of Health      Received from [facility], [employer] & [email or web address] [facility]    Financial Resource Strain    Received from [facility], [employer] & [facility]    Social Connections     Home Meds:  Medications Prior to Admission   Medication Sig Dispense Refill Last Dose/Taking    albuterol (PROVENTIL HFA;VENTOLIN HFA) 90 mcg/actuation inhaler [ALBUTEROL (PROVENTIL HFA;VENTOLIN HFA) 90 MCG/ACTUATION INHALER] Inhale 2 puffs every 6 (six) hours as needed for wheezing or shortness of breath.   More than a month    Alpha Lipoic Ac-Biotin-Keratin (BIOTIN-KERATIN-ALPHA LIPOIC AC PO) Take 1 capsule by mouth daily.   10/22/2024    ALPRAZolam (XANAX) 0.5 MG tablet [ALPRAZOLAM (XANAX) 0.5 MG TABLET] Take 0.5 mg by mouth 3 (three) times a day as needed for anxiety.   Past Week    aspirin 81 MG EC tablet [ASPIRIN 81 MG EC TABLET] Take 81 mg by mouth daily.   10/23/2024    AZO-CRANBERRY PO Take 1 tablet by mouth three times a week.   10/22/2024    cephALEXin (KEFLEX) 500 MG capsule Take 500 mg by mouth daily as needed (as needed for kidney stones).   Taking As Needed     cyanocobalamin 1000 MCG tablet [CYANOCOBALAMIN 1000 MCG TABLET] Take 1,000 mcg by mouth daily.   10/22/2024    fluticasone (FLONASE) 50 mcg/actuation nasal spray [FLUTICASONE (FLONASE) 50 MCG/ACTUATION NASAL SPRAY] 2 sprays into each nostril daily as needed for rhinitis.   10/22/2024    lisinopril (PRINIVIL,ZESTRIL) 10 MG tablet Take 10 mg by mouth daily.   10/23/2024    metFORMIN (GLUCOPHAGE XR) 500 MG 24 hr tablet Take 500 mg by mouth daily (with dinner).   Past Week    nitroGLYcerin (NITROSTAT) 0.4 MG sublingual tablet For chest pain place 1 tablet under the tongue every 5 minutes for 3 doses. If symptoms persist 5 minutes after 1st dose call 911. 15 tablet 5 Unknown    omeprazole (PRILOSEC) 20 MG capsule [OMEPRAZOLE (PRILOSEC) 20 MG CAPSULE] Take 20 mg by mouth every evening.    10/22/2024    Probiotic Product (PROBIOTIC 10 ULTRA STRENGTH PO) Take 1 capsule by mouth three times a week. Alternates  with AZO Cranberry.   Past Week    rosuvastatin (CRESTOR) 10 MG tablet Take 1 tablet (10 mg) by mouth daily. 90 tablet 3 10/22/2024 at  9:00 PM     Allergies:  Allergies   Allergen Reactions    Atorvastatin Nausea    Simvastatin Nausea       ROS: Negative unless noted in HPI    Physical Exam:  Temp:  [98.3  F (36.8  C)-98.9  F (37.2  C)] 98.3  F (36.8  C)  Pulse:  [] 71  Resp:  [10-20] 18  BP: (141-192)/(58-88) 161/70  SpO2:  [98 %-100 %] 98 %  Gen: NAD, resting comfortably in bed, conversational  Lungs: CTA in all fields, no wheezing or rhonchi  CV: RRR, S1S2 normal, no murmur. No dependent edema.   Abd:  overall soft and non distended, nontender, no masses/guarding/rebound tenderness.   Musculoskeletal: grossly intact  Neuro: AOx3, CN II-VII grossly intact  Mental: normal mood and affect, regular rate of speech    Labs:  ABG   No lab results found in last 7 days.  CBC  Recent Labs   Lab 10/24/24  0240 10/23/24  1635 10/23/24  1233   WBC 9.1 9.5 8.4   HGB 13.8 14.1 14.8    263 287     BMP  Recent Labs    Lab 10/24/24  0808 10/24/24  0240 10/23/24  2119 10/23/24  1659 10/23/24  1453 10/23/24  1233   NA  --  144  --   --   --  140   POTASSIUM  --  4.0  --   --   --  3.8   CHLORIDE  --  107  --   --   --  100   CO2  --  27  --   --   --  27   BUN  --  10.3  --   --   --  10.7   CR  --  0.77  --   --   --  0.73   * 121* 144* 113*   < > 191*    < > = values in this interval not displayed.     LFT  Recent Labs   Lab 10/24/24  0240   AST 16   ALT 12   ALKPHOS 67   BILITOTAL 1.1   ALBUMIN 4.1     PancreasNo lab results found in last 7 days.   I attest that I have seen and examined this patient on October 24, 2024.  I agree with the consult note as written by Lila Fitzgerald PA-C.  We plan to perform an urgent CABG on her tomorrow.  The risks and benefits were described to the patient and her  in detail.

## 2024-10-24 NOTE — PLAN OF CARE
"Goal Outcome Evaluation:      Plan of Care Reviewed With: patient, spouse    Overall Patient Progress: no change  Problem: Adult Inpatient Plan of Care  Goal: Plan of Care Review  Description: The Plan of Care Review/Shift note should be completed every shift.  The Outcome Evaluation is a brief statement about your assessment that the patient is improving, declining, or no change.  This information will be displayed automatically on your shift  note.  Outcome: Progressing  Flowsheets (Taken 10/23/2024 2228)  Plan of Care Reviewed With:   patient   spouse  Overall Patient Progress: no change  Goal: Patient-Specific Goal (Individualized)  Description: You can add care plan individualizations to a care plan. Examples of Individualization might be:  \"Parent requests to be called daily at 9am for status\", \"I have a hard time hearing out of my right ear\", or \"Do not touch me to wake me up as it startles  me\".  Outcome: Progressing     Problem: Pain Acute  Goal: Optimal Pain Control and Function  Outcome: Progressing     Problem: Cardiac Catheterization (Diagnostic/Interventional)  Goal: Absence of Bleeding  Outcome: Progressing   Holly had a good shift. Dr. Draper made aware of elevated blood pressures, see new orders for hydralazine. No chest pain noted. She had a headache at HS, treated effectively with tylenol. Heparin running at 850 as ordered. Vitals otherwise stable.   "

## 2024-10-24 NOTE — PROGRESS NOTES
[facility] [address]  [place] [address]   [email or web address]   OFFICE: [phone]     CARDIOLOGY FOLLOW-UP NOTE      Impression and Plan     Impression:   Coronary artery disease: Patient had been noting symptoms 4 weeks ago with chest discomfort with walking that radiated into her shoulder blades.  Patient had an abnormal echo stress test 10/17/2024.  This led to coronary angiogram 10/23/2024 that showed multivessel coronary artery disease including 95% narrowing in proximal LAD, 70% in OM1 and OM 3 of left circumflex, and 70% in right PDA.  CV surgery consulted and plan CABG tomorrow 10/25/24. On heparin.   Dyslipidemia: Patient was previously on pravastatin.  This was switched to rosuvastatin 10 mg daily 10/22/2024, and increased to 40 mg daily 10/23/2024.  .  Hypertension: On lisinopril 10 mg daily.  Diabetes mellitus type 2: Hemoglobin A1c 7.1.    Plan:  Remain on Heparin gtt  CABG scheduled 10/25  Start carvedilol 6.25 mg twice daily  Continue lisinopril 10 mg daily.  May need to increase this for better blood pressure control if carvedilol does not lower it enough.  Continue rosuvastatin 40 mg daily    Primary Cardiologist: None.  Can establish with Dr. Godwin    Subjective     Patient denies any chest discomfort, shortness of breath, palpitations, lightheadedness.  Patient is anxious about surgery tomorrow but notes this needs to be completed.  Had questions regarding how much pain to expect in recovery.  Patient does note having history of keloid scar after surgery on her right arm.    Cardiac Diagnostics     Cardiac Cath 10/23/2024  - multi-vessel CAD; normal L-sided filling pressures  - continue ASA 81mg daily indefinitely, heparin bridge  - increase rosuva to 40 but low threshold to consider PCSK9 inhibitors  - continue aggressive risk factor modification     Findings:  LM:no obstruction  LAD:proximal 95% narrowing  Lcx:OM1 70%, OM3 70%  "narrowing  RCA:dominant, 30% proximal, 70% rPDA narrowing    Echo stress test 10/17/2024  Stress echo conclusion:  Abnormal study.  Large area of apical, anterior, and anteroseptal ischemia.  Additionally noted was a fall with an ejection fraction with stress.  This is worrisome for the presence of more extensive underlying coronary artery disease and is associated with a worse prognosis.  Resting echocardiographic findings:  1.  Normal resting left ventricular size, estimated EF 60 to 65%.  No resting regional wall motion abnormalities noted  2.  Aortic valve sclerosis.  No evidence of aortic stenosis.  Trivial aortic insufficiency  3.  Mitral annular calcification.  Mild mitral regurgitation  4.  Structurally and functionally unremarkable tricuspid and pulmonic valve  Physical Examination       BP (!) 161/70 (BP Location: Right arm)   Pulse 71   Temp 98.3  F (36.8  C) (Oral)   Resp 18   Ht 1.676 m (5' 6\")   Wt 70.8 kg (156 lb 1.6 oz)   SpO2 98%   BMI 25.20 kg/m               Intake/Output Summary (Last 24 hours) at 10/24/2024 0821  Last data filed at 10/24/2024 0248  Gross per 24 hour   Intake 680 ml   Output 720 ml   Net -40 ml       General: pleasant female. No acute distress.   HENT: external ears normal. Nares patent. Mucous membranes moist.  Eyes: perrla, extraocular muscles intact. No scleral icterus.   Neck: No JVD  Lungs: clear to auscultation  COR:  regular rate and rhythm, No murmurs, rubs, or gallops  Extrem: No edema         Imaging      No new imaging    Lab Results   Lab Results   Component Value Date    CHOL 187 05/06/2024    HDL 51 05/06/2024    TRIG 136 05/06/2024    BUN 10.3 10/24/2024     10/24/2024    CO2 27 10/24/2024       Lab Results   Component Value Date    WBC 9.1 10/24/2024    HGB 13.8 10/24/2024    HCT 42.8 10/24/2024    MCV 90 10/24/2024     10/24/2024       Lab Results   Component Value Date    TSH 1.97 11/07/2023               Current Inpatient Scheduled " Medications   Scheduled Meds:  Current Facility-Administered Medications   Medication Dose Route Frequency Provider Last Rate Last Admin    aspirin EC tablet 81 mg  81 mg Oral Daily Tyrone Pendleton MD        cyanocobalamin (VITAMIN B-12) tablet 1,000 mcg  1,000 mcg Oral Daily Tyrone Pendleton MD        insulin aspart (NovoLOG) injection (RAPID ACTING)  1-3 Units Subcutaneous TID AC Delbert Gonzalez DO        [START ON 10/25/2024] lactobacillus rhamnosus (GG) (CULTURELL) capsule 1 capsule  1 capsule Oral Once per day on Monday Wednesday Friday Russell Cheng MD        lisinopril (ZESTRIL) tablet 10 mg  10 mg Oral Daily Hamlet Draper MD   10 mg at 10/23/24 1804    [Held by provider] metFORMIN (GLUCOPHAGE XR) 24 hr tablet 500 mg  500 mg Oral Daily with supper Tyrone Pendleton MD        omeprazole (PriLOSEC OTC) EC tablet 20 mg  20 mg Oral Daily with supper Russell Cheng MD   20 mg at 10/23/24 1930    rosuvastatin (CRESTOR) tablet 40 mg  40 mg Oral Daily Delbert Gonzalez DO   40 mg at 10/23/24 1931     Continuous Infusions:  Current Facility-Administered Medications   Medication Dose Route Frequency Provider Last Rate Last Admin    heparin 25,000 units in 0.45% NaCl 250 mL ANTICOAGULANT infusion  0-5,000 Units/hr Intravenous Continuous Tyrone Pendleton MD 8.5 mL/hr at 10/24/24 0311 850 Units/hr at 10/24/24 0311            Medications Prior to Admission   Prior to Admission medications    Medication Sig Start Date End Date Taking? Authorizing Provider   albuterol (PROVENTIL HFA;VENTOLIN HFA) 90 mcg/actuation inhaler [ALBUTEROL (PROVENTIL HFA;VENTOLIN HFA) 90 MCG/ACTUATION INHALER] Inhale 2 puffs every 6 (six) hours as needed for wheezing or shortness of breath. 10/22/15  Yes Provider, Historical   Alpha Lipoic Ac-Biotin-Keratin (BIOTIN-KERATIN-ALPHA LIPOIC AC PO) Take 1 capsule by mouth daily.   Yes Reported, Patient   ALPRAZolam (XANAX) 0.5 MG tablet [ALPRAZOLAM (XANAX) 0.5 MG TABLET] Take 0.5 mg  by mouth 3 (three) times a day as needed for anxiety. 10/21/15  Yes Provider, Historical   aspirin 81 MG EC tablet [ASPIRIN 81 MG EC TABLET] Take 81 mg by mouth daily. 10/21/15  Yes Provider, Historical   AZO-CRANBERRY PO Take 1 tablet by mouth three times a week.   Yes Reported, Patient   cephALEXin (KEFLEX) 500 MG capsule Take 500 mg by mouth daily as needed (as needed for kidney stones).   Yes Unknown, Entered By History   cyanocobalamin 1000 MCG tablet [CYANOCOBALAMIN 1000 MCG TABLET] Take 1,000 mcg by mouth daily. 10/22/15  Yes Provider, Historical   fluticasone (FLONASE) 50 mcg/actuation nasal spray [FLUTICASONE (FLONASE) 50 MCG/ACTUATION NASAL SPRAY] 2 sprays into each nostril daily as needed for rhinitis. 10/21/15  Yes Provider, Historical   lisinopril (PRINIVIL,ZESTRIL) 10 MG tablet Take 10 mg by mouth daily. 10/21/15  Yes Provider, Historical   metFORMIN (GLUCOPHAGE XR) 500 MG 24 hr tablet Take 500 mg by mouth daily (with dinner).   Yes Reported, Patient   nitroGLYcerin (NITROSTAT) 0.4 MG sublingual tablet For chest pain place 1 tablet under the tongue every 5 minutes for 3 doses. If symptoms persist 5 minutes after 1st dose call 911. 10/22/24  Yes Olga Godwin MD   omeprazole (PRILOSEC) 20 MG capsule [OMEPRAZOLE (PRILOSEC) 20 MG CAPSULE] Take 20 mg by mouth every evening.  10/21/15  Yes Provider, Historical   Probiotic Product (PROBIOTIC 10 ULTRA STRENGTH PO) Take 1 capsule by mouth three times a week. Alternates  with AZO Cranberry.   Yes Reported, Patient   rosuvastatin (CRESTOR) 10 MG tablet Take 1 tablet (10 mg) by mouth daily. 10/22/24  Yes Olga Godwin MD   rosuvastatin (CRESTOR) 40 MG tablet Take 1 tablet (40 mg) by mouth daily. 10/23/24  Yes Tyrone Pendleton MD Rachal A Omana, PA-C

## 2024-10-24 NOTE — PROGRESS NOTES
[facility]    Medicine Progress Note - Hospitalist Service    Date of Admission:  10/23/2024    Assessment & Plan   69 year old old female with HTN, HL, DM2, accelerating angina who had an abnormal stress test leading to a coronary angiogram today.  Coronary angiogram demonstrated multi-vessel CAD.  CV surgery consulted.     Multi-vessel CAD  -- Coronary angiogram completed on 10/23 which showed:   LM: no obstruction  LAD:proximal 95% narrowing  Lcx:OM1 70%, OM3 70% narrowing  RCA:dominant, 30% proximal, 70% rPDA narrowing  -- ASA, Crestor increased to 40mg every day, Lisinopril, Carvedilol  -- Heparin gtt per protocol  -- CV surgery consult appreciated: Carotid US. OR scheduled for 10/25 for CABGx4 with Dr. Fuentes.    Essential HTN  -- PTA Lisinopril. Carvedilol started on 10/24  -- Monitor vital signs per protocol  -- Hydralazin iv prn    DM 2  -- Continue to hold Metformin  -- Accu-checks, Low sliding scale insulin, hypoglycemia protocol  -- A1c: 7.1%    HLD  -- Continue Increased Crestor at 40mg every day with goal of LDL<70    Mild intermittent asthma w/o acute exacerbation  -- Albuterol inhaler prn    GERD  -- PTA Omeprazole    Anxiety d/o  -- PTA Alprazolam prn          Diet: Advance Diet as Tolerated: Regular Diet Adult; 2 gm NA Diet  NPO per Anesthesia Guidelines for Procedure/Surgery Except for: Meds    DVT Prophylaxis: Heparin gtt  Bojorquez Catheter: Not present  Lines: None     Cardiac Monitoring: ACTIVE order. Indication: Post- PCI/Angiogram (24 hours)  Code Status: Full Code      Clinically Significant Risk Factors Present on Admission                 # Drug Induced Platelet Defect: home medication list includes an antiplatelet medication   # Hypertension: Home medication list includes antihypertensive(s)        # DMII: A1C = 7.1 % (Ref range: <5.7 %) within past 6 months    # Overweight: Estimated body mass index is 25.2 kg/m  as calculated from the following:    Height as of  "this encounter: 1.676 m (5' 6\").    Weight as of this encounter: 70.8 kg (156 lb 1.6 oz).       # Asthma: noted on problem list        Disposition Plan     Medically Ready for Discharge: Anticipated in 2-4 Days             Ayleen Copeland MD  Hospitalist Service  [facility]  Securely message with Your Policy Manager (more info)  Text page via [employer] Paging/Directory   ______________________________________________________________________    Interval History   Patient is new to me today. Chart reviewed.  Patient is seen and examined at bedside.   Denied CP, dyspnea  Plan of care discussed with patient. All questions answered. Pt verbalized understanding.     Physical Exam   Vital Signs: Temp: 98.4  F (36.9  C) Temp src: Oral BP: (!) 166/78 Pulse: 77   Resp: 18 SpO2: 100 % O2 Device: None (Room air)    Weight: 156 lbs 1.6 oz    GEN: Alert and oriented. Not in acute distress.  HEENT: Atraumatic, mucous membrane- moist and pink.  Chest: Bilateral air entry.  CVS: S1S2 regular.   Abdomen: Soft. Non-tender, non-distended. No organomegaly. No guarding or rigidity. Bowel sounds active.   Extremities: No pedal edema.  CNS: No involuntary movements.  Skin: no cyanosis or clubbing.     Medical Decision Making       50 MINUTES SPENT BY ME on the date of service doing chart review, history, exam, documentation & further activities per the note.      Data     "

## 2024-10-24 NOTE — PLAN OF CARE
Problem: Pain Acute  Goal: Optimal Pain Control and Function  10/24/2024 1359 by Ivana Villagomze, RN  Outcome: Progressing  10/24/2024 1358 by Ivana Villagomez, RN  Outcome: Progressing     Problem: Comorbidity Management  Goal: Blood Glucose Levels Within Targeted Range  Outcome: Progressing     Problem: Anxiety Signs/Symptoms  Goal: Optimized Cognitive Function (Anxiety Signs/Symptoms)  Outcome: Progressing   Goal Outcome Evaluation:    Patient denied pain.   & 144. No insulin given. Patient states that she is not going to order tray today.  Patient down for Carotid US today.  Checklist for CABG started. Videos watched by patient and . She already had her heart pillow and her open heart surgery folder.  Heparin running @ 850 unit(s)/hr. Anti Xa 0.43 second consecutive within range result.  Per MD Heparin to remain running until 0500 tomorrow.

## 2024-10-25 ENCOUNTER — ANESTHESIA (OUTPATIENT)
Dept: SURGERY | Facility: HOSPITAL | Age: 69
DRG: 234 | End: 2024-10-25
Payer: COMMERCIAL

## 2024-10-25 ENCOUNTER — APPOINTMENT (OUTPATIENT)
Dept: RADIOLOGY | Facility: HOSPITAL | Age: 69
DRG: 234 | End: 2024-10-25
Attending: THORACIC SURGERY (CARDIOTHORACIC VASCULAR SURGERY)
Payer: COMMERCIAL

## 2024-10-25 DIAGNOSIS — Z95.1 S/P CABG (CORONARY ARTERY BYPASS GRAFT): Primary | ICD-10-CM

## 2024-10-25 PROBLEM — R07.9 CHEST PAIN: Status: ACTIVE | Noted: 2024-10-25

## 2024-10-25 PROBLEM — I20.0 ACCELERATING ANGINA (H): Status: ACTIVE | Noted: 2024-10-25

## 2024-10-25 PROBLEM — J45.20 MILD INTERMITTENT ASTHMA WITHOUT COMPLICATION: Status: ACTIVE | Noted: 2024-10-25

## 2024-10-25 LAB
ALBUMIN SERPL BCG-MCNC: 3.7 G/DL (ref 3.5–5.2)
ALBUMIN SERPL BCG-MCNC: 4.1 G/DL (ref 3.5–5.2)
ALP SERPL-CCNC: 38 U/L (ref 40–150)
ALP SERPL-CCNC: 49 U/L (ref 40–150)
ALT SERPL W P-5'-P-CCNC: 12 U/L (ref 0–50)
ALT SERPL W P-5'-P-CCNC: 8 U/L (ref 0–50)
ANION GAP SERPL CALCULATED.3IONS-SCNC: 12 MMOL/L (ref 7–15)
ANION GAP SERPL CALCULATED.3IONS-SCNC: 14 MMOL/L (ref 7–15)
ANION GAP SERPL CALCULATED.3IONS-SCNC: 15 MMOL/L (ref 7–15)
APTT PPP: 51 SECONDS (ref 22–38)
APTT PPP: 66 SECONDS (ref 22–38)
AST SERPL W P-5'-P-CCNC: 31 U/L (ref 0–45)
AST SERPL W P-5'-P-CCNC: 37 U/L (ref 0–45)
BASE EXCESS BLDA CALC-SCNC: -0.1 MMOL/L (ref -3–3)
BASE EXCESS BLDA CALC-SCNC: -1.7 MMOL/L (ref -3–3)
BASE EXCESS BLDA CALC-SCNC: -3 MMOL/L (ref -3–3)
BASE EXCESS BLDA CALC-SCNC: 0.2 MMOL/L (ref -3–3)
BASE EXCESS BLDA CALC-SCNC: 1.1 MMOL/L (ref -3–3)
BASE EXCESS BLDV CALC-SCNC: 1.3 MMOL/L (ref -3–3)
BASOPHILS # BLD AUTO: 0.1 10E3/UL (ref 0–0.2)
BASOPHILS NFR BLD AUTO: 1 %
BILIRUB SERPL-MCNC: 0.8 MG/DL
BILIRUB SERPL-MCNC: 0.8 MG/DL
BUN SERPL-MCNC: 12.2 MG/DL (ref 8–23)
BUN SERPL-MCNC: 12.6 MG/DL (ref 8–23)
BUN SERPL-MCNC: 15.1 MG/DL (ref 8–23)
CA-I BLD-MCNC: 3.9 MG/DL (ref 4.4–5.2)
CA-I BLD-MCNC: 4 MG/DL (ref 4.4–5.2)
CA-I BLD-MCNC: 4.3 MG/DL (ref 4.4–5.2)
CA-I BLD-MCNC: 4.5 MG/DL (ref 4.4–5.2)
CA-I BLD-MCNC: 4.6 MG/DL (ref 4.4–5.2)
CA-I BLD-MCNC: 5 MG/DL (ref 4.4–5.2)
CALCIUM SERPL-MCNC: 10.6 MG/DL (ref 8.8–10.4)
CALCIUM SERPL-MCNC: 7.6 MG/DL (ref 8.8–10.4)
CALCIUM SERPL-MCNC: 8.2 MG/DL (ref 8.8–10.4)
CHLORIDE SERPL-SCNC: 102 MMOL/L (ref 98–107)
CHLORIDE SERPL-SCNC: 110 MMOL/L (ref 98–107)
CHLORIDE SERPL-SCNC: 113 MMOL/L (ref 98–107)
COHGB MFR BLD: 100 % (ref 95–96)
CPB POCT: NO
CREAT SERPL-MCNC: 0.68 MG/DL (ref 0.51–0.95)
CREAT SERPL-MCNC: 0.69 MG/DL (ref 0.51–0.95)
CREAT SERPL-MCNC: 0.75 MG/DL (ref 0.51–0.95)
EGFRCR SERPLBLD CKD-EPI 2021: 86 ML/MIN/1.73M2
EGFRCR SERPLBLD CKD-EPI 2021: >90 ML/MIN/1.73M2
EGFRCR SERPLBLD CKD-EPI 2021: >90 ML/MIN/1.73M2
EOSINOPHIL # BLD AUTO: 0.3 10E3/UL (ref 0–0.7)
EOSINOPHIL NFR BLD AUTO: 3 %
ERYTHROCYTE [DISTWIDTH] IN BLOOD BY AUTOMATED COUNT: 11.9 % (ref 10–15)
ERYTHROCYTE [DISTWIDTH] IN BLOOD BY AUTOMATED COUNT: 12 % (ref 10–15)
ERYTHROCYTE [DISTWIDTH] IN BLOOD BY AUTOMATED COUNT: 12 % (ref 10–15)
FIBRINOGEN PPP-MCNC: 237 MG/DL (ref 170–510)
GLUCOSE BLD-MCNC: 107 MG/DL (ref 70–99)
GLUCOSE BLD-MCNC: 128 MG/DL (ref 70–99)
GLUCOSE BLD-MCNC: 137 MG/DL (ref 70–99)
GLUCOSE BLD-MCNC: 140 MG/DL (ref 70–99)
GLUCOSE BLDC GLUCOMTR-MCNC: 125 MG/DL (ref 70–99)
GLUCOSE BLDC GLUCOMTR-MCNC: 126 MG/DL (ref 70–99)
GLUCOSE BLDC GLUCOMTR-MCNC: 130 MG/DL (ref 70–99)
GLUCOSE BLDC GLUCOMTR-MCNC: 137 MG/DL (ref 70–99)
GLUCOSE BLDC GLUCOMTR-MCNC: 142 MG/DL (ref 70–99)
GLUCOSE BLDC GLUCOMTR-MCNC: 147 MG/DL (ref 70–99)
GLUCOSE BLDC GLUCOMTR-MCNC: 152 MG/DL (ref 70–99)
GLUCOSE SERPL-MCNC: 112 MG/DL (ref 70–99)
GLUCOSE SERPL-MCNC: 138 MG/DL (ref 70–99)
GLUCOSE SERPL-MCNC: 139 MG/DL (ref 70–99)
HCO3 BLD-SCNC: 22 MMOL/L (ref 21–28)
HCO3 BLDA-SCNC: 19 MMOL/L (ref 21–28)
HCO3 BLDA-SCNC: 23 MMOL/L (ref 21–28)
HCO3 BLDA-SCNC: 24 MMOL/L (ref 21–28)
HCO3 BLDA-SCNC: 25 MMOL/L (ref 21–28)
HCO3 BLDV-SCNC: 25 MMOL/L (ref 21–28)
HCO3 SERPL-SCNC: 20 MMOL/L (ref 22–29)
HCO3 SERPL-SCNC: 24 MMOL/L (ref 22–29)
HCO3 SERPL-SCNC: 25 MMOL/L (ref 22–29)
HCT VFR BLD AUTO: 31.6 % (ref 35–47)
HCT VFR BLD AUTO: 33.7 % (ref 35–47)
HCT VFR BLD AUTO: 48.6 % (ref 35–47)
HCT VFR BLD CALC: 31 % (ref 35–47)
HGB BLD-MCNC: 10.4 G/DL (ref 11.7–15.7)
HGB BLD-MCNC: 10.5 G/DL (ref 11.7–15.7)
HGB BLD-MCNC: 10.5 G/DL (ref 11.7–15.7)
HGB BLD-MCNC: 11 G/DL (ref 11.7–15.7)
HGB BLD-MCNC: 13.4 G/DL (ref 11.7–15.7)
HGB BLD-MCNC: 15.1 G/DL (ref 11.7–15.7)
HGB BLD-MCNC: 9.1 G/DL (ref 11.7–15.7)
HGB BLD-MCNC: 9.3 G/DL (ref 11.7–15.7)
IMM GRANULOCYTES # BLD: 0 10E3/UL
IMM GRANULOCYTES NFR BLD: 1 %
INR PPP: 1.31 (ref 0.85–1.15)
INR PPP: 1.43 (ref 0.85–1.15)
LACTATE BLD-SCNC: 0.9 MMOL/L (ref 0.7–2)
LACTATE BLD-SCNC: 1 MMOL/L (ref 0.7–2)
LACTATE BLD-SCNC: 1.5 MMOL/L (ref 0.7–2)
LACTATE BLD-SCNC: 1.6 MMOL/L (ref 0.7–2)
LACTATE SERPL-SCNC: 1.7 MMOL/L (ref 0.7–2)
LYMPHOCYTES # BLD AUTO: 2.8 10E3/UL (ref 0.8–5.3)
LYMPHOCYTES NFR BLD AUTO: 34 %
MAGNESIUM SERPL-MCNC: 3.9 MG/DL (ref 1.7–2.3)
MCH RBC QN AUTO: 28.1 PG (ref 26.5–33)
MCH RBC QN AUTO: 29 PG (ref 26.5–33)
MCH RBC QN AUTO: 29.5 PG (ref 26.5–33)
MCHC RBC AUTO-ENTMCNC: 31.1 G/DL (ref 31.5–36.5)
MCHC RBC AUTO-ENTMCNC: 32.6 G/DL (ref 31.5–36.5)
MCHC RBC AUTO-ENTMCNC: 32.9 G/DL (ref 31.5–36.5)
MCV RBC AUTO: 89 FL (ref 78–100)
MCV RBC AUTO: 90 FL (ref 78–100)
MCV RBC AUTO: 91 FL (ref 78–100)
MONOCYTES # BLD AUTO: 0.7 10E3/UL (ref 0–1.3)
MONOCYTES NFR BLD AUTO: 8 %
MRSA DNA SPEC QL NAA+PROBE: NEGATIVE
NEUTROPHILS # BLD AUTO: 4.5 10E3/UL (ref 1.6–8.3)
NEUTROPHILS NFR BLD AUTO: 53 %
NRBC # BLD AUTO: 0 10E3/UL
NRBC BLD AUTO-RTO: 0 /100
O2/TOTAL GAS SETTING VFR VENT: 45 %
OXYHGB MFR BLDA: 99 % (ref 92–100)
OXYHGB MFR BLDV: 85 % (ref 70–75)
PCO2 BLD: 27 MM HG (ref 35–45)
PCO2 BLDA: 27 MM HG (ref 35–45)
PCO2 BLDA: 29 MM HG (ref 35–45)
PCO2 BLDA: 33 MM HG (ref 35–45)
PCO2 BLDA: 35 MM HG (ref 35–45)
PCO2 BLDV: 40 MM HG (ref 40–50)
PEEP: 5 CM H2O
PH BLD: 7.53 [PH] (ref 7.35–7.45)
PH BLDA: 7.44 [PH] (ref 7.35–7.45)
PH BLDA: 7.44 [PH] (ref 7.35–7.45)
PH BLDA: 7.46 [PH] (ref 7.35–7.45)
PH BLDA: 7.52 [PH] (ref 7.35–7.45)
PH BLDV: 7.42 [PH] (ref 7.32–7.43)
PHOSPHATE SERPL-MCNC: 1.9 MG/DL (ref 2.5–4.5)
PHOSPHATE SERPL-MCNC: 5 MG/DL (ref 2.5–4.5)
PLATELET # BLD AUTO: 173 10E3/UL (ref 150–450)
PLATELET # BLD AUTO: 190 10E3/UL (ref 150–450)
PLATELET # BLD AUTO: 254 10E3/UL (ref 150–450)
PO2 BLD: 153 MM HG (ref 80–105)
PO2 BLDA: 108 MM HG (ref 80–105)
PO2 BLDA: 243 MM HG (ref 80–105)
PO2 BLDA: 383 MM HG (ref 80–105)
PO2 BLDA: 428 MM HG (ref 80–105)
PO2 BLDV: 48 MM HG (ref 25–47)
POTASSIUM BLD-SCNC: 3.8 MMOL/L (ref 3.4–5.3)
POTASSIUM BLD-SCNC: 4.2 MMOL/L (ref 3.4–5.3)
POTASSIUM BLD-SCNC: 4.6 MMOL/L (ref 3.4–5.3)
POTASSIUM BLD-SCNC: 4.7 MMOL/L (ref 3.4–5.3)
POTASSIUM BLD-SCNC: 5.7 MMOL/L (ref 3.4–5.3)
POTASSIUM SERPL-SCNC: 4 MMOL/L (ref 3.4–5.3)
POTASSIUM SERPL-SCNC: 4 MMOL/L (ref 3.4–5.3)
POTASSIUM SERPL-SCNC: 4.2 MMOL/L (ref 3.4–5.3)
POTASSIUM SERPL-SCNC: 4.2 MMOL/L (ref 3.4–5.3)
PROT SERPL-MCNC: 5.5 G/DL (ref 6.4–8.3)
PROT SERPL-MCNC: 5.5 G/DL (ref 6.4–8.3)
RBC # BLD AUTO: 3.53 10E6/UL (ref 3.8–5.2)
RBC # BLD AUTO: 3.79 10E6/UL (ref 3.8–5.2)
RBC # BLD AUTO: 5.37 10E6/UL (ref 3.8–5.2)
SA TARGET DNA: NEGATIVE
SAO2 % BLDA: 100 % (ref 95–96)
SAO2 % BLDA: 99 % (ref 92–100)
SAO2 % BLDA: 99 % (ref 92–100)
SAO2 % BLDV: 86 % (ref 70–75)
SODIUM BLD-SCNC: 139 MMOL/L (ref 135–145)
SODIUM BLD-SCNC: 139 MMOL/L (ref 135–145)
SODIUM BLD-SCNC: 140 MMOL/L (ref 135–145)
SODIUM BLD-SCNC: 142 MMOL/L (ref 135–145)
SODIUM BLD-SCNC: 143 MMOL/L (ref 135–145)
SODIUM SERPL-SCNC: 141 MMOL/L (ref 135–145)
SODIUM SERPL-SCNC: 145 MMOL/L (ref 135–145)
SODIUM SERPL-SCNC: 149 MMOL/L (ref 135–145)
UFH PPP CHRO-ACNC: 0.4 IU/ML
WBC # BLD AUTO: 13.6 10E3/UL (ref 4–11)
WBC # BLD AUTO: 19 10E3/UL (ref 4–11)
WBC # BLD AUTO: 8.4 10E3/UL (ref 4–11)

## 2024-10-25 PROCEDURE — 82330 ASSAY OF CALCIUM: CPT

## 2024-10-25 PROCEDURE — 250N000009 HC RX 250: Performed by: THORACIC SURGERY (CARDIOTHORACIC VASCULAR SURGERY)

## 2024-10-25 PROCEDURE — 85520 HEPARIN ASSAY: CPT | Performed by: THORACIC SURGERY (CARDIOTHORACIC VASCULAR SURGERY)

## 2024-10-25 PROCEDURE — 82803 BLOOD GASES ANY COMBINATION: CPT

## 2024-10-25 PROCEDURE — 33519 CABG ARTERY-VEIN THREE: CPT | Performed by: THORACIC SURGERY (CARDIOTHORACIC VASCULAR SURGERY)

## 2024-10-25 PROCEDURE — 250N000011 HC RX IP 250 OP 636: Performed by: NURSE ANESTHETIST, CERTIFIED REGISTERED

## 2024-10-25 PROCEDURE — 83735 ASSAY OF MAGNESIUM: CPT | Performed by: PHYSICIAN ASSISTANT

## 2024-10-25 PROCEDURE — 5A1223Z PERFORMANCE OF CARDIAC PACING, CONTINUOUS: ICD-10-PCS | Performed by: THORACIC SURGERY (CARDIOTHORACIC VASCULAR SURGERY)

## 2024-10-25 PROCEDURE — 85025 COMPLETE CBC W/AUTO DIFF WBC: CPT | Performed by: THORACIC SURGERY (CARDIOTHORACIC VASCULAR SURGERY)

## 2024-10-25 PROCEDURE — 33509 NDSC HRV UXTR ART 1 SGM CAB: CPT | Performed by: NURSE ANESTHETIST, CERTIFIED REGISTERED

## 2024-10-25 PROCEDURE — 93005 ELECTROCARDIOGRAM TRACING: CPT | Performed by: PHYSICIAN ASSISTANT

## 2024-10-25 PROCEDURE — 82805 BLOOD GASES W/O2 SATURATION: CPT | Performed by: THORACIC SURGERY (CARDIOTHORACIC VASCULAR SURGERY)

## 2024-10-25 PROCEDURE — 84100 ASSAY OF PHOSPHORUS: CPT | Performed by: THORACIC SURGERY (CARDIOTHORACIC VASCULAR SURGERY)

## 2024-10-25 PROCEDURE — 36415 COLL VENOUS BLD VENIPUNCTURE: CPT | Performed by: STUDENT IN AN ORGANIZED HEALTH CARE EDUCATION/TRAINING PROGRAM

## 2024-10-25 PROCEDURE — 250N000009 HC RX 250: Performed by: STUDENT IN AN ORGANIZED HEALTH CARE EDUCATION/TRAINING PROGRAM

## 2024-10-25 PROCEDURE — 85384 FIBRINOGEN ACTIVITY: CPT | Performed by: STUDENT IN AN ORGANIZED HEALTH CARE EDUCATION/TRAINING PROGRAM

## 2024-10-25 PROCEDURE — 85730 THROMBOPLASTIN TIME PARTIAL: CPT | Performed by: STUDENT IN AN ORGANIZED HEALTH CARE EDUCATION/TRAINING PROGRAM

## 2024-10-25 PROCEDURE — 021209W BYPASS CORONARY ARTERY, THREE ARTERIES FROM AORTA WITH AUTOLOGOUS VENOUS TISSUE, OPEN APPROACH: ICD-10-PCS | Performed by: THORACIC SURGERY (CARDIOTHORACIC VASCULAR SURGERY)

## 2024-10-25 PROCEDURE — P9045 ALBUMIN (HUMAN), 5%, 250 ML: HCPCS | Performed by: THORACIC SURGERY (CARDIOTHORACIC VASCULAR SURGERY)

## 2024-10-25 PROCEDURE — 85610 PROTHROMBIN TIME: CPT | Performed by: PHYSICIAN ASSISTANT

## 2024-10-25 PROCEDURE — P9045 ALBUMIN (HUMAN), 5%, 250 ML: HCPCS | Performed by: ANESTHESIOLOGY

## 2024-10-25 PROCEDURE — 85730 THROMBOPLASTIN TIME PARTIAL: CPT | Performed by: PHYSICIAN ASSISTANT

## 2024-10-25 PROCEDURE — 200N000001 HC R&B ICU

## 2024-10-25 PROCEDURE — 94002 VENT MGMT INPAT INIT DAY: CPT

## 2024-10-25 PROCEDURE — 250N000011 HC RX IP 250 OP 636: Performed by: THORACIC SURGERY (CARDIOTHORACIC VASCULAR SURGERY)

## 2024-10-25 PROCEDURE — 250N000012 HC RX MED GY IP 250 OP 636 PS 637: Performed by: THORACIC SURGERY (CARDIOTHORACIC VASCULAR SURGERY)

## 2024-10-25 PROCEDURE — 33509 NDSC HRV UXTR ART 1 SGM CAB: CPT | Mod: 59 | Performed by: THORACIC SURGERY (CARDIOTHORACIC VASCULAR SURGERY)

## 2024-10-25 PROCEDURE — 85027 COMPLETE CBC AUTOMATED: CPT | Performed by: STUDENT IN AN ORGANIZED HEALTH CARE EDUCATION/TRAINING PROGRAM

## 2024-10-25 PROCEDURE — 33508 ENDOSCOPIC VEIN HARVEST: CPT | Mod: 59 | Performed by: THORACIC SURGERY (CARDIOTHORACIC VASCULAR SURGERY)

## 2024-10-25 PROCEDURE — 5A1221Z PERFORMANCE OF CARDIAC OUTPUT, CONTINUOUS: ICD-10-PCS | Performed by: THORACIC SURGERY (CARDIOTHORACIC VASCULAR SURGERY)

## 2024-10-25 PROCEDURE — 02100Z9 BYPASS CORONARY ARTERY, ONE ARTERY FROM LEFT INTERNAL MAMMARY, OPEN APPROACH: ICD-10-PCS | Performed by: THORACIC SURGERY (CARDIOTHORACIC VASCULAR SURGERY)

## 2024-10-25 PROCEDURE — 370N000017 HC ANESTHESIA TECHNICAL FEE, PER MIN: Performed by: THORACIC SURGERY (CARDIOTHORACIC VASCULAR SURGERY)

## 2024-10-25 PROCEDURE — 99223 1ST HOSP IP/OBS HIGH 75: CPT | Mod: 24 | Performed by: INTERNAL MEDICINE

## 2024-10-25 PROCEDURE — 999N000156 HC STATISTIC RCP CONSULT EA 30 MIN

## 2024-10-25 PROCEDURE — 999N000157 HC STATISTIC RCP TIME EA 10 MIN

## 2024-10-25 PROCEDURE — 36415 COLL VENOUS BLD VENIPUNCTURE: CPT | Performed by: THORACIC SURGERY (CARDIOTHORACIC VASCULAR SURGERY)

## 2024-10-25 PROCEDURE — 999N000259 HC STATISTIC EXTUBATION

## 2024-10-25 PROCEDURE — 02100AW BYPASS CORONARY ARTERY, ONE ARTERY FROM AORTA WITH AUTOLOGOUS ARTERIAL TISSUE, OPEN APPROACH: ICD-10-PCS | Performed by: THORACIC SURGERY (CARDIOTHORACIC VASCULAR SURGERY)

## 2024-10-25 PROCEDURE — 84132 ASSAY OF SERUM POTASSIUM: CPT | Performed by: PHYSICIAN ASSISTANT

## 2024-10-25 PROCEDURE — 999N000055 HC STATISTIC END TITIAL CO2 MONITORING

## 2024-10-25 PROCEDURE — 999N000253 HC STATISTIC WEANING TRIALS

## 2024-10-25 PROCEDURE — 250N000011 HC RX IP 250 OP 636: Performed by: ANESTHESIOLOGY

## 2024-10-25 PROCEDURE — 250N000013 HC RX MED GY IP 250 OP 250 PS 637: Performed by: THORACIC SURGERY (CARDIOTHORACIC VASCULAR SURGERY)

## 2024-10-25 PROCEDURE — 82330 ASSAY OF CALCIUM: CPT | Performed by: PHYSICIAN ASSISTANT

## 2024-10-25 PROCEDURE — 410N000003 HC PER-PERFUSION 1ST 30 MIN: Performed by: THORACIC SURGERY (CARDIOTHORACIC VASCULAR SURGERY)

## 2024-10-25 PROCEDURE — 03BC4ZZ EXCISION OF LEFT RADIAL ARTERY, PERCUTANEOUS ENDOSCOPIC APPROACH: ICD-10-PCS | Performed by: THORACIC SURGERY (CARDIOTHORACIC VASCULAR SURGERY)

## 2024-10-25 PROCEDURE — 250N000025 HC SEVOFLURANE, PER MIN: Performed by: THORACIC SURGERY (CARDIOTHORACIC VASCULAR SURGERY)

## 2024-10-25 PROCEDURE — 06BP4ZZ EXCISION OF RIGHT SAPHENOUS VEIN, PERCUTANEOUS ENDOSCOPIC APPROACH: ICD-10-PCS | Performed by: THORACIC SURGERY (CARDIOTHORACIC VASCULAR SURGERY)

## 2024-10-25 PROCEDURE — 272N000001 HC OR GENERAL SUPPLY STERILE: Performed by: THORACIC SURGERY (CARDIOTHORACIC VASCULAR SURGERY)

## 2024-10-25 PROCEDURE — 99233 SBSQ HOSP IP/OBS HIGH 50: CPT | Performed by: STUDENT IN AN ORGANIZED HEALTH CARE EDUCATION/TRAINING PROGRAM

## 2024-10-25 PROCEDURE — 258N000003 HC RX IP 258 OP 636: Performed by: ANESTHESIOLOGY

## 2024-10-25 PROCEDURE — 80048 BASIC METABOLIC PNL TOTAL CA: CPT | Performed by: THORACIC SURGERY (CARDIOTHORACIC VASCULAR SURGERY)

## 2024-10-25 PROCEDURE — 258N000003 HC RX IP 258 OP 636: Performed by: THORACIC SURGERY (CARDIOTHORACIC VASCULAR SURGERY)

## 2024-10-25 PROCEDURE — 33509 NDSC HRV UXTR ART 1 SGM CAB: CPT | Performed by: ANESTHESIOLOGY

## 2024-10-25 PROCEDURE — 272N000004 HC RX 272: Performed by: THORACIC SURGERY (CARDIOTHORACIC VASCULAR SURGERY)

## 2024-10-25 PROCEDURE — 85014 HEMATOCRIT: CPT | Performed by: PHYSICIAN ASSISTANT

## 2024-10-25 PROCEDURE — C1760 CLOSURE DEV, VASC: HCPCS | Performed by: THORACIC SURGERY (CARDIOTHORACIC VASCULAR SURGERY)

## 2024-10-25 PROCEDURE — 250N000011 HC RX IP 250 OP 636: Performed by: PHYSICIAN ASSISTANT

## 2024-10-25 PROCEDURE — 82330 ASSAY OF CALCIUM: CPT | Performed by: THORACIC SURGERY (CARDIOTHORACIC VASCULAR SURGERY)

## 2024-10-25 PROCEDURE — 250N000013 HC RX MED GY IP 250 OP 250 PS 637: Performed by: STUDENT IN AN ORGANIZED HEALTH CARE EDUCATION/TRAINING PROGRAM

## 2024-10-25 PROCEDURE — 250N000009 HC RX 250: Performed by: ANESTHESIOLOGY

## 2024-10-25 PROCEDURE — 83605 ASSAY OF LACTIC ACID: CPT | Performed by: PHYSICIAN ASSISTANT

## 2024-10-25 PROCEDURE — P9016 RBC LEUKOCYTES REDUCED: HCPCS | Performed by: THORACIC SURGERY (CARDIOTHORACIC VASCULAR SURGERY)

## 2024-10-25 PROCEDURE — 94799 UNLISTED PULMONARY SVC/PX: CPT

## 2024-10-25 PROCEDURE — 06BQ4ZZ EXCISION OF LEFT SAPHENOUS VEIN, PERCUTANEOUS ENDOSCOPIC APPROACH: ICD-10-PCS | Performed by: THORACIC SURGERY (CARDIOTHORACIC VASCULAR SURGERY)

## 2024-10-25 PROCEDURE — 85610 PROTHROMBIN TIME: CPT | Performed by: STUDENT IN AN ORGANIZED HEALTH CARE EDUCATION/TRAINING PROGRAM

## 2024-10-25 PROCEDURE — 250N000024 HC ISOFLURANE, PER MIN: Performed by: THORACIC SURGERY (CARDIOTHORACIC VASCULAR SURGERY)

## 2024-10-25 PROCEDURE — C1898 LEAD, PMKR, OTHER THAN TRANS: HCPCS | Performed by: THORACIC SURGERY (CARDIOTHORACIC VASCULAR SURGERY)

## 2024-10-25 PROCEDURE — 410N000004: Performed by: THORACIC SURGERY (CARDIOTHORACIC VASCULAR SURGERY)

## 2024-10-25 PROCEDURE — 360N000079 HC SURGERY LEVEL 6, PER MIN: Performed by: THORACIC SURGERY (CARDIOTHORACIC VASCULAR SURGERY)

## 2024-10-25 PROCEDURE — 33534 CABG ARTERIAL TWO: CPT | Performed by: THORACIC SURGERY (CARDIOTHORACIC VASCULAR SURGERY)

## 2024-10-25 PROCEDURE — 999N000063 XR CHEST PORT 1 VIEW

## 2024-10-25 PROCEDURE — 84100 ASSAY OF PHOSPHORUS: CPT | Performed by: PHYSICIAN ASSISTANT

## 2024-10-25 PROCEDURE — 999N000141 HC STATISTIC PRE-PROCEDURE NURSING ASSESSMENT: Performed by: THORACIC SURGERY (CARDIOTHORACIC VASCULAR SURGERY)

## 2024-10-25 PROCEDURE — 250N000013 HC RX MED GY IP 250 OP 250 PS 637: Performed by: PHYSICIAN ASSISTANT

## 2024-10-25 RX ORDER — FENTANYL CITRATE-0.9 % NACL/PF 10 MCG/ML
PLASTIC BAG, INJECTION (ML) INTRAVENOUS
Status: DISCONTINUED
Start: 2024-10-25 | End: 2024-10-25 | Stop reason: HOSPADM

## 2024-10-25 RX ORDER — FENTANYL CITRATE 50 UG/ML
INJECTION, SOLUTION INTRAMUSCULAR; INTRAVENOUS PRN
Status: DISCONTINUED | OUTPATIENT
Start: 2024-10-25 | End: 2024-10-25

## 2024-10-25 RX ORDER — NALOXONE HYDROCHLORIDE 0.4 MG/ML
0.4 INJECTION, SOLUTION INTRAMUSCULAR; INTRAVENOUS; SUBCUTANEOUS
Status: DISCONTINUED | OUTPATIENT
Start: 2024-10-25 | End: 2024-10-31 | Stop reason: HOSPADM

## 2024-10-25 RX ORDER — PANTOPRAZOLE SODIUM 40 MG/1
40 TABLET, DELAYED RELEASE ORAL DAILY
Status: DISCONTINUED | OUTPATIENT
Start: 2024-10-25 | End: 2024-10-27

## 2024-10-25 RX ORDER — GLYCOPYRROLATE 0.2 MG/ML
INJECTION, SOLUTION INTRAMUSCULAR; INTRAVENOUS PRN
Status: DISCONTINUED | OUTPATIENT
Start: 2024-10-25 | End: 2024-10-25

## 2024-10-25 RX ORDER — HEPARIN SODIUM 5000 [USP'U]/.5ML
5000 INJECTION, SOLUTION INTRAVENOUS; SUBCUTANEOUS EVERY 8 HOURS
Status: DISCONTINUED | OUTPATIENT
Start: 2024-10-26 | End: 2024-10-31 | Stop reason: HOSPADM

## 2024-10-25 RX ORDER — PHENYLEPHRINE HCL IN 0.9% NACL 50MG/250ML
.1-4 PLASTIC BAG, INJECTION (ML) INTRAVENOUS CONTINUOUS PRN
Status: DISCONTINUED | OUTPATIENT
Start: 2024-10-25 | End: 2024-10-26

## 2024-10-25 RX ORDER — SODIUM CHLORIDE 9 MG/ML
INJECTION, SOLUTION INTRAVENOUS CONTINUOUS PRN
Status: DISCONTINUED | OUTPATIENT
Start: 2024-10-25 | End: 2024-10-25

## 2024-10-25 RX ORDER — DEXMEDETOMIDINE HYDROCHLORIDE 4 UG/ML
.2-1.2 INJECTION, SOLUTION INTRAVENOUS CONTINUOUS
Status: DISCONTINUED | OUTPATIENT
Start: 2024-10-25 | End: 2024-10-25

## 2024-10-25 RX ORDER — VANCOMYCIN HYDROCHLORIDE 1 G/20ML
INJECTION, POWDER, LYOPHILIZED, FOR SOLUTION INTRAVENOUS PRN
Status: DISCONTINUED | OUTPATIENT
Start: 2024-10-25 | End: 2024-10-25 | Stop reason: HOSPADM

## 2024-10-25 RX ORDER — VECURONIUM BROMIDE 1 MG/ML
INJECTION, POWDER, LYOPHILIZED, FOR SOLUTION INTRAVENOUS PRN
Status: DISCONTINUED | OUTPATIENT
Start: 2024-10-25 | End: 2024-10-25

## 2024-10-25 RX ORDER — ASPIRIN 81 MG/1
162 TABLET, CHEWABLE ORAL ONCE
Status: COMPLETED | OUTPATIENT
Start: 2024-10-25 | End: 2024-10-25

## 2024-10-25 RX ORDER — SODIUM CHLORIDE, SODIUM LACTATE, POTASSIUM CHLORIDE, CALCIUM CHLORIDE 600; 310; 30; 20 MG/100ML; MG/100ML; MG/100ML; MG/100ML
INJECTION, SOLUTION INTRAVENOUS CONTINUOUS
Status: DISCONTINUED | OUTPATIENT
Start: 2024-10-25 | End: 2024-10-25

## 2024-10-25 RX ORDER — HYDROMORPHONE HYDROCHLORIDE 1 MG/ML
0.4 INJECTION, SOLUTION INTRAMUSCULAR; INTRAVENOUS; SUBCUTANEOUS
Status: DISCONTINUED | OUTPATIENT
Start: 2024-10-25 | End: 2024-10-28

## 2024-10-25 RX ORDER — NOREPINEPHRINE BITARTRATE 0.02 MG/ML
.01-.1 INJECTION, SOLUTION INTRAVENOUS CONTINUOUS
Status: DISCONTINUED | OUTPATIENT
Start: 2024-10-25 | End: 2024-10-25

## 2024-10-25 RX ORDER — DEXTROSE MONOHYDRATE 25 G/50ML
25-50 INJECTION, SOLUTION INTRAVENOUS
Status: DISCONTINUED | OUTPATIENT
Start: 2024-10-25 | End: 2024-10-31 | Stop reason: HOSPADM

## 2024-10-25 RX ORDER — VECURONIUM BROMIDE 1 MG/ML
INJECTION, POWDER, LYOPHILIZED, FOR SOLUTION INTRAVENOUS
Status: DISCONTINUED
Start: 2024-10-25 | End: 2024-10-25 | Stop reason: HOSPADM

## 2024-10-25 RX ORDER — NALOXONE HYDROCHLORIDE 0.4 MG/ML
0.2 INJECTION, SOLUTION INTRAMUSCULAR; INTRAVENOUS; SUBCUTANEOUS
Status: DISCONTINUED | OUTPATIENT
Start: 2024-10-25 | End: 2024-10-31 | Stop reason: HOSPADM

## 2024-10-25 RX ORDER — PROTAMINE SULFATE 10 MG/ML
INJECTION, SOLUTION INTRAVENOUS PRN
Status: DISCONTINUED | OUTPATIENT
Start: 2024-10-25 | End: 2024-10-25

## 2024-10-25 RX ORDER — ASPIRIN 81 MG/1
162 TABLET, CHEWABLE ORAL
Status: COMPLETED | OUTPATIENT
Start: 2024-10-25 | End: 2024-10-25

## 2024-10-25 RX ORDER — HEPARIN SODIUM 1000 [USP'U]/ML
INJECTION, SOLUTION INTRAVENOUS; SUBCUTANEOUS PRN
Status: DISCONTINUED | OUTPATIENT
Start: 2024-10-25 | End: 2024-10-25

## 2024-10-25 RX ORDER — DEXMEDETOMIDINE HYDROCHLORIDE 4 UG/ML
INJECTION, SOLUTION INTRAVENOUS
Status: DISCONTINUED
Start: 2024-10-25 | End: 2024-10-25 | Stop reason: HOSPADM

## 2024-10-25 RX ORDER — LIDOCAINE 4 G/G
1-2 PATCH TOPICAL EVERY 24 HOURS
Status: DISCONTINUED | OUTPATIENT
Start: 2024-10-25 | End: 2024-10-31 | Stop reason: HOSPADM

## 2024-10-25 RX ORDER — KETAMINE HYDROCHLORIDE 10 MG/ML
INJECTION INTRAMUSCULAR; INTRAVENOUS PRN
Status: DISCONTINUED | OUTPATIENT
Start: 2024-10-25 | End: 2024-10-25

## 2024-10-25 RX ORDER — ONDANSETRON 4 MG/1
4 TABLET, ORALLY DISINTEGRATING ORAL EVERY 6 HOURS PRN
Status: DISCONTINUED | OUTPATIENT
Start: 2024-10-25 | End: 2024-10-31 | Stop reason: HOSPADM

## 2024-10-25 RX ORDER — CEFAZOLIN SODIUM 1 G/3ML
1 INJECTION, POWDER, FOR SOLUTION INTRAMUSCULAR; INTRAVENOUS EVERY 8 HOURS
Status: COMPLETED | OUTPATIENT
Start: 2024-10-25 | End: 2024-10-26

## 2024-10-25 RX ORDER — LIDOCAINE HYDROCHLORIDE 10 MG/ML
INJECTION, SOLUTION INFILTRATION; PERINEURAL PRN
Status: DISCONTINUED | OUTPATIENT
Start: 2024-10-25 | End: 2024-10-25

## 2024-10-25 RX ORDER — SODIUM CHLORIDE, SODIUM GLUCONATE, SODIUM ACETATE, POTASSIUM CHLORIDE AND MAGNESIUM CHLORIDE 526; 502; 368; 37; 30 MG/100ML; MG/100ML; MG/100ML; MG/100ML; MG/100ML
1000 INJECTION, SOLUTION INTRAVENOUS
Status: DISCONTINUED | OUTPATIENT
Start: 2024-10-25 | End: 2024-10-25

## 2024-10-25 RX ORDER — POLYETHYLENE GLYCOL 3350 17 G/17G
17 POWDER, FOR SOLUTION ORAL DAILY
Status: DISCONTINUED | OUTPATIENT
Start: 2024-10-26 | End: 2024-10-31 | Stop reason: HOSPADM

## 2024-10-25 RX ORDER — MAGNESIUM SULFATE 4 G/50ML
4 INJECTION INTRAVENOUS ONCE
Status: COMPLETED | OUTPATIENT
Start: 2024-10-25 | End: 2024-10-25

## 2024-10-25 RX ORDER — HYDRALAZINE HYDROCHLORIDE 20 MG/ML
10 INJECTION INTRAMUSCULAR; INTRAVENOUS EVERY 30 MIN PRN
Status: DISCONTINUED | OUTPATIENT
Start: 2024-10-25 | End: 2024-10-31 | Stop reason: HOSPADM

## 2024-10-25 RX ORDER — CHLORHEXIDINE GLUCONATE ORAL RINSE 1.2 MG/ML
10 SOLUTION DENTAL ONCE
Status: COMPLETED | OUTPATIENT
Start: 2024-10-25 | End: 2024-10-25

## 2024-10-25 RX ORDER — ONDANSETRON 2 MG/ML
4 INJECTION INTRAMUSCULAR; INTRAVENOUS EVERY 6 HOURS PRN
Status: DISCONTINUED | OUTPATIENT
Start: 2024-10-25 | End: 2024-10-31 | Stop reason: HOSPADM

## 2024-10-25 RX ORDER — METHADONE HYDROCHLORIDE 10 MG/ML
INJECTION, SOLUTION INTRAMUSCULAR; INTRAVENOUS; SUBCUTANEOUS
Status: DISCONTINUED
Start: 2024-10-25 | End: 2024-10-25 | Stop reason: HOSPADM

## 2024-10-25 RX ORDER — PHENYLEPHRINE HCL IN 0.9% NACL 50MG/250ML
.1-6 PLASTIC BAG, INJECTION (ML) INTRAVENOUS CONTINUOUS
Status: DISCONTINUED | OUTPATIENT
Start: 2024-10-25 | End: 2024-10-25

## 2024-10-25 RX ORDER — OXYCODONE HYDROCHLORIDE 5 MG/1
10 TABLET ORAL EVERY 4 HOURS PRN
Status: DISCONTINUED | OUTPATIENT
Start: 2024-10-25 | End: 2024-10-31 | Stop reason: HOSPADM

## 2024-10-25 RX ORDER — ASPIRIN 300 MG/1
300 SUPPOSITORY RECTAL ONCE
Status: COMPLETED | OUTPATIENT
Start: 2024-10-25 | End: 2024-10-25

## 2024-10-25 RX ORDER — HEPARIN SOD,PORCINE/0.9 % NACL 30K/1000ML
INTRAVENOUS SOLUTION INTRAVENOUS
Status: DISCONTINUED | OUTPATIENT
Start: 2024-10-25 | End: 2024-10-25

## 2024-10-25 RX ORDER — CALCIUM GLUCONATE 20 MG/ML
2 INJECTION, SOLUTION INTRAVENOUS
Status: DISCONTINUED | OUTPATIENT
Start: 2024-10-25 | End: 2024-10-31 | Stop reason: HOSPADM

## 2024-10-25 RX ORDER — ASPIRIN 300 MG/1
300 SUPPOSITORY RECTAL DAILY
Status: DISCONTINUED | OUTPATIENT
Start: 2024-10-26 | End: 2024-10-28 | Stop reason: ALTCHOICE

## 2024-10-25 RX ORDER — DEXMEDETOMIDINE HYDROCHLORIDE 4 UG/ML
.1-1.2 INJECTION, SOLUTION INTRAVENOUS CONTINUOUS
Status: DISCONTINUED | OUTPATIENT
Start: 2024-10-25 | End: 2024-10-26

## 2024-10-25 RX ORDER — EPHEDRINE SULFATE 50 MG/ML
INJECTION, SOLUTION INTRAMUSCULAR; INTRAVENOUS; SUBCUTANEOUS PRN
Status: DISCONTINUED | OUTPATIENT
Start: 2024-10-25 | End: 2024-10-25

## 2024-10-25 RX ORDER — BISACODYL 10 MG
10 SUPPOSITORY, RECTAL RECTAL DAILY PRN
Status: DISCONTINUED | OUTPATIENT
Start: 2024-10-25 | End: 2024-10-31 | Stop reason: HOSPADM

## 2024-10-25 RX ORDER — ASPIRIN 81 MG/1
162 TABLET, CHEWABLE ORAL DAILY
Status: DISCONTINUED | OUTPATIENT
Start: 2024-10-26 | End: 2024-10-31 | Stop reason: HOSPADM

## 2024-10-25 RX ORDER — LIDOCAINE 40 MG/G
CREAM TOPICAL
Status: DISCONTINUED | OUTPATIENT
Start: 2024-10-25 | End: 2024-10-25

## 2024-10-25 RX ORDER — CALCIUM GLUCONATE 20 MG/ML
1 INJECTION, SOLUTION INTRAVENOUS
Status: DISCONTINUED | OUTPATIENT
Start: 2024-10-25 | End: 2024-10-31 | Stop reason: HOSPADM

## 2024-10-25 RX ORDER — ACETAMINOPHEN 650 MG/1
650 SUPPOSITORY RECTAL EVERY 8 HOURS
Status: DISCONTINUED | OUTPATIENT
Start: 2024-10-25 | End: 2024-10-27

## 2024-10-25 RX ORDER — PROCHLORPERAZINE MALEATE 5 MG/1
5 TABLET ORAL EVERY 6 HOURS PRN
Status: DISCONTINUED | OUTPATIENT
Start: 2024-10-25 | End: 2024-10-31 | Stop reason: HOSPADM

## 2024-10-25 RX ORDER — HYDROMORPHONE HCL IN WATER/PF 6 MG/30 ML
0.2 PATIENT CONTROLLED ANALGESIA SYRINGE INTRAVENOUS
Status: DISCONTINUED | OUTPATIENT
Start: 2024-10-25 | End: 2024-10-28

## 2024-10-25 RX ORDER — OXYCODONE HYDROCHLORIDE 5 MG/1
5 TABLET ORAL EVERY 4 HOURS PRN
Status: DISCONTINUED | OUTPATIENT
Start: 2024-10-25 | End: 2024-10-31 | Stop reason: HOSPADM

## 2024-10-25 RX ORDER — AMOXICILLIN 250 MG
1 CAPSULE ORAL 2 TIMES DAILY
Status: DISCONTINUED | OUTPATIENT
Start: 2024-10-25 | End: 2024-10-31 | Stop reason: HOSPADM

## 2024-10-25 RX ORDER — PROPOFOL 10 MG/ML
INJECTION, EMULSION INTRAVENOUS PRN
Status: DISCONTINUED | OUTPATIENT
Start: 2024-10-25 | End: 2024-10-25

## 2024-10-25 RX ORDER — ACETAMINOPHEN 325 MG/1
975 TABLET ORAL EVERY 8 HOURS
Status: DISCONTINUED | OUTPATIENT
Start: 2024-10-25 | End: 2024-10-27

## 2024-10-25 RX ORDER — CEFAZOLIN SODIUM/WATER 2 G/20 ML
2 SYRINGE (ML) INTRAVENOUS
Status: COMPLETED | OUTPATIENT
Start: 2024-10-25 | End: 2024-10-25

## 2024-10-25 RX ORDER — NICOTINE POLACRILEX 4 MG
15-30 LOZENGE BUCCAL
Status: DISCONTINUED | OUTPATIENT
Start: 2024-10-25 | End: 2024-10-31 | Stop reason: HOSPADM

## 2024-10-25 RX ORDER — ACETAMINOPHEN 325 MG/1
650 TABLET ORAL EVERY 4 HOURS PRN
Status: DISCONTINUED | OUTPATIENT
Start: 2024-10-28 | End: 2024-10-26

## 2024-10-25 RX ORDER — CEFAZOLIN SODIUM/WATER 2 G/20 ML
2 SYRINGE (ML) INTRAVENOUS SEE ADMIN INSTRUCTIONS
Status: DISCONTINUED | OUTPATIENT
Start: 2024-10-25 | End: 2024-10-25

## 2024-10-25 RX ORDER — ASPIRIN 81 MG/1
81 TABLET, CHEWABLE ORAL
Status: COMPLETED | OUTPATIENT
Start: 2024-10-25 | End: 2024-10-25

## 2024-10-25 RX ORDER — NITROGLYCERIN 10 MG/100ML
INJECTION INTRAVENOUS PRN
Status: DISCONTINUED | OUTPATIENT
Start: 2024-10-25 | End: 2024-10-25

## 2024-10-25 RX ADMIN — NITROGLYCERIN 40 MCG: 10 INJECTION INTRAVENOUS at 08:30

## 2024-10-25 RX ADMIN — ONDANSETRON 4 MG: 2 INJECTION INTRAMUSCULAR; INTRAVENOUS at 19:04

## 2024-10-25 RX ADMIN — EPINEPHRINE 0.03 MCG/KG/MIN: 1 INJECTION INTRAMUSCULAR; INTRAVENOUS; SUBCUTANEOUS at 11:07

## 2024-10-25 RX ADMIN — AMINOCAPROIC ACID 5 G: 250 INJECTION, SOLUTION INTRAVENOUS at 07:47

## 2024-10-25 RX ADMIN — Medication 10 MG: at 08:18

## 2024-10-25 RX ADMIN — SODIUM PHOSPHATE, MONOBASIC, MONOHYDRATE AND SODIUM PHOSPHATE, DIBASIC, ANHYDROUS 15 MMOL: 142; 276 INJECTION, SOLUTION INTRAVENOUS at 15:39

## 2024-10-25 RX ADMIN — FENTANYL CITRATE 50 MCG: 50 INJECTION INTRAMUSCULAR; INTRAVENOUS at 07:28

## 2024-10-25 RX ADMIN — PHENYLEPHRINE HYDROCHLORIDE 200 MCG: 10 INJECTION INTRAVENOUS at 11:21

## 2024-10-25 RX ADMIN — HYDROMORPHONE HYDROCHLORIDE 0.2 MG: 0.2 INJECTION, SOLUTION INTRAMUSCULAR; INTRAVENOUS; SUBCUTANEOUS at 21:24

## 2024-10-25 RX ADMIN — ALBUMIN HUMAN 12.5 G: 0.05 INJECTION, SOLUTION INTRAVENOUS at 13:45

## 2024-10-25 RX ADMIN — PHENYLEPHRINE HYDROCHLORIDE 200 MCG: 10 INJECTION INTRAVENOUS at 08:54

## 2024-10-25 RX ADMIN — Medication 0.2 MCG/KG/MIN: at 08:53

## 2024-10-25 RX ADMIN — Medication 2 G: at 07:08

## 2024-10-25 RX ADMIN — FENTANYL CITRATE 100 MCG: 50 INJECTION INTRAMUSCULAR; INTRAVENOUS at 11:24

## 2024-10-25 RX ADMIN — CALCIUM GLUCONATE 1 G: 20 INJECTION, SOLUTION INTRAVENOUS at 13:53

## 2024-10-25 RX ADMIN — PROTAMINE SULFATE 250 MG: 10 INJECTION, SOLUTION INTRAVENOUS at 11:15

## 2024-10-25 RX ADMIN — SODIUM CHLORIDE: 9 INJECTION, SOLUTION INTRAVENOUS at 07:47

## 2024-10-25 RX ADMIN — NITROGLYCERIN 20 MCG: 10 INJECTION INTRAVENOUS at 08:13

## 2024-10-25 RX ADMIN — NITROGLYCERIN 40 MCG: 10 INJECTION INTRAVENOUS at 11:19

## 2024-10-25 RX ADMIN — PHENYLEPHRINE HYDROCHLORIDE 200 MCG: 10 INJECTION INTRAVENOUS at 12:29

## 2024-10-25 RX ADMIN — NITROGLYCERIN 40 MCG: 10 INJECTION INTRAVENOUS at 08:56

## 2024-10-25 RX ADMIN — NITROGLYCERIN 20 MCG: 10 INJECTION INTRAVENOUS at 11:48

## 2024-10-25 RX ADMIN — CEFAZOLIN 1 G: 1 INJECTION, POWDER, FOR SOLUTION INTRAMUSCULAR; INTRAVENOUS at 20:06

## 2024-10-25 RX ADMIN — NITROGLYCERIN 20 MCG: 10 INJECTION INTRAVENOUS at 11:11

## 2024-10-25 RX ADMIN — LIDOCAINE HYDROCHLORIDE 5 ML: 10 INJECTION, SOLUTION INFILTRATION; PERINEURAL at 07:28

## 2024-10-25 RX ADMIN — VECURONIUM BROMIDE 5 MG: 1 INJECTION, POWDER, LYOPHILIZED, FOR SOLUTION INTRAVENOUS at 09:10

## 2024-10-25 RX ADMIN — MIDAZOLAM 2 MG: 1 INJECTION INTRAMUSCULAR; INTRAVENOUS at 07:07

## 2024-10-25 RX ADMIN — VECURONIUM BROMIDE 2 MG: 1 INJECTION, POWDER, LYOPHILIZED, FOR SOLUTION INTRAVENOUS at 08:28

## 2024-10-25 RX ADMIN — NITROGLYCERIN 40 MCG: 10 INJECTION INTRAVENOUS at 08:21

## 2024-10-25 RX ADMIN — SUGAMMADEX 200 MG: 100 INJECTION, SOLUTION INTRAVENOUS at 13:19

## 2024-10-25 RX ADMIN — Medication 50 MEQ: at 15:20

## 2024-10-25 RX ADMIN — ASPIRIN 81 MG CHEWABLE TABLET 162 MG: 81 TABLET CHEWABLE at 05:42

## 2024-10-25 RX ADMIN — SODIUM CHLORIDE, POTASSIUM CHLORIDE, SODIUM LACTATE AND CALCIUM CHLORIDE: 600; 310; 30; 20 INJECTION, SOLUTION INTRAVENOUS at 06:50

## 2024-10-25 RX ADMIN — PROPOFOL 50 MG: 10 INJECTION, EMULSION INTRAVENOUS at 07:35

## 2024-10-25 RX ADMIN — ROCURONIUM BROMIDE 20 MG: 50 INJECTION, SOLUTION INTRAVENOUS at 13:08

## 2024-10-25 RX ADMIN — NITROGLYCERIN 20 MCG: 10 INJECTION INTRAVENOUS at 09:15

## 2024-10-25 RX ADMIN — NITROGLYCERIN 40 MCG: 10 INJECTION INTRAVENOUS at 12:34

## 2024-10-25 RX ADMIN — METOPROLOL TARTRATE 12.5 MG: 25 TABLET, FILM COATED ORAL at 05:42

## 2024-10-25 RX ADMIN — ACETAMINOPHEN 650 MG: 650 SUPPOSITORY RECTAL at 16:21

## 2024-10-25 RX ADMIN — LIDOCAINE 1 PATCH: 4 PATCH TOPICAL at 17:01

## 2024-10-25 RX ADMIN — NICARDIPINE HYDROCHLORIDE 1 MG/HR: 0.2 INJECTION, SOLUTION INTRAVENOUS at 10:55

## 2024-10-25 RX ADMIN — NITROGLYCERIN 60 MCG: 10 INJECTION INTRAVENOUS at 08:23

## 2024-10-25 RX ADMIN — AMINOCAPROIC ACID 1 G/HR: 250 INJECTION, SOLUTION INTRAVENOUS at 08:59

## 2024-10-25 RX ADMIN — Medication 5 MG: at 07:47

## 2024-10-25 RX ADMIN — PHENYLEPHRINE HYDROCHLORIDE 100 MCG: 10 INJECTION INTRAVENOUS at 09:17

## 2024-10-25 RX ADMIN — DEXMEDETOMIDINE HYDROCHLORIDE 0.5 MCG/KG/HR: 400 INJECTION INTRAVENOUS at 07:52

## 2024-10-25 RX ADMIN — ALBUMIN HUMAN 12.5 G: 0.05 INJECTION, SOLUTION INTRAVENOUS at 20:26

## 2024-10-25 RX ADMIN — VECURONIUM BROMIDE 8 MG: 1 INJECTION, POWDER, LYOPHILIZED, FOR SOLUTION INTRAVENOUS at 07:28

## 2024-10-25 RX ADMIN — Medication 2 G: at 10:59

## 2024-10-25 RX ADMIN — ASPIRIN 300 MG: 300 SUPPOSITORY RECTAL at 16:25

## 2024-10-25 RX ADMIN — PANTOPRAZOLE SODIUM 40 MG: 40 INJECTION, POWDER, FOR SOLUTION INTRAVENOUS at 16:39

## 2024-10-25 RX ADMIN — PROCHLORPERAZINE EDISYLATE 5 MG: 5 INJECTION INTRAMUSCULAR; INTRAVENOUS at 21:00

## 2024-10-25 RX ADMIN — ALBUMIN HUMAN: 0.05 INJECTION, SOLUTION INTRAVENOUS at 11:52

## 2024-10-25 RX ADMIN — INSULIN HUMAN 2 UNITS/HR: 1 INJECTION, SOLUTION INTRAVENOUS at 08:11

## 2024-10-25 RX ADMIN — MAGNESIUM SULFATE HEPTAHYDRATE 4 G: 80 INJECTION, SOLUTION INTRAVENOUS at 06:44

## 2024-10-25 RX ADMIN — PHENYLEPHRINE HYDROCHLORIDE 100 MCG: 10 INJECTION INTRAVENOUS at 09:24

## 2024-10-25 RX ADMIN — HEPARIN SODIUM 35000 UNITS: 1000 INJECTION INTRAVENOUS; SUBCUTANEOUS at 08:52

## 2024-10-25 RX ADMIN — KETAMINE HYDROCHLORIDE 50 MG: 10 INJECTION INTRAMUSCULAR; INTRAVENOUS at 07:28

## 2024-10-25 RX ADMIN — NITROGLYCERIN 40 MCG: 10 INJECTION INTRAVENOUS at 08:59

## 2024-10-25 RX ADMIN — SODIUM BICARBONATE 50 MEQ: 84 INJECTION, SOLUTION INTRAVENOUS at 15:20

## 2024-10-25 RX ADMIN — NITROGLYCERIN 40 MCG: 10 INJECTION INTRAVENOUS at 11:16

## 2024-10-25 RX ADMIN — GLYCOPYRROLATE 0.2 MG: 0.2 INJECTION INTRAMUSCULAR; INTRAVENOUS at 08:23

## 2024-10-25 RX ADMIN — HYDROMORPHONE HYDROCHLORIDE 1 MG: 1 INJECTION, SOLUTION INTRAMUSCULAR; INTRAVENOUS; SUBCUTANEOUS at 12:11

## 2024-10-25 RX ADMIN — PROPOFOL 150 MG: 10 INJECTION, EMULSION INTRAVENOUS at 07:28

## 2024-10-25 RX ADMIN — FENTANYL CITRATE 50 MCG: 50 INJECTION INTRAMUSCULAR; INTRAVENOUS at 08:18

## 2024-10-25 RX ADMIN — Medication 5 MG: at 08:12

## 2024-10-25 RX ADMIN — Medication 10 MG: at 07:27

## 2024-10-25 RX ADMIN — Medication 15 MG: at 07:35

## 2024-10-25 RX ADMIN — ALBUMIN HUMAN 12.5 G: 0.05 INJECTION, SOLUTION INTRAVENOUS at 21:18

## 2024-10-25 RX ADMIN — DEXMEDETOMIDINE HYDROCHLORIDE 0.6 MCG/KG/HR: 4 INJECTION, SOLUTION INTRAVENOUS at 15:35

## 2024-10-25 RX ADMIN — CHLORHEXIDINE GLUCONATE 0.12% ORAL RINSE 10 ML: 1.2 LIQUID ORAL at 05:42

## 2024-10-25 RX ADMIN — HYDROMORPHONE HYDROCHLORIDE 0.2 MG: 1 INJECTION, SOLUTION INTRAMUSCULAR; INTRAVENOUS; SUBCUTANEOUS at 13:32

## 2024-10-25 NOTE — PROGRESS NOTES
"Metoprolol 12.5mg PO , ASA 162mg PO, Peridex swish and spit done. Nasal  done. Pt accompanied by aide via WC down to SACU. Pt's belongings taken over to ICU \"charge bin.\" Pt's  took pt's cell phone.  "

## 2024-10-25 NOTE — OP NOTE
OPERATIVE REPORT     OPERATING ROOM:  Room 8    October 25, 2024    PROCEDURES PERFORMED:   Median sternotomy   Take down of the left internal mammary artery    Endoscopic greater saphenous vein procurement from both     thighs    Endoscopic radial artery procurement from the left forearm    Epiaortic ultrasound of the ascending aorta    Placement on central cardiopulmonary bypass    Quintuple vessel coronary artery bypass grafting;   -  Left internal mammary artery to the left anterior descending coronary artery  -  Left radial artery to the obtuse marginal 1  -  Separate reversed saphenous vein grafts to left anterior descending diagonal branch, the obtuse marginal 3 and the right posterior descending coronary arteries..    Placement of temporary atrial and ventricular pacing wires     SURGEONS:    Attending Surgeon:  Rachel Fuentes MD  First Assistant: Rosmery Payne. STSAC  Cardiothoracic Surgery Fellow:  Raymundo Melendez MD  Physician Assistant:  Vanesa Oreilly PA-C     ANESTHESIA:  General endotracheal    SKIN PREP:  Betadine and Duraprep    INCISION:  Median sternotomy, skip incisions left wrist and elbow, skip incisions left and right upper legs     DRAINS: One 28 Fr mediastinal and one 24 Fr Jose Alejandro drain left pleural space   CULTURES: None  SPECIMENS: None  CLOSURE: Routine     PREOPERATIVE DIAGNOSES:  Exertional anginal  Abnormal stress test  Severe multivessel coronary artery disease  Preserved left ventricular function  Type 2 diabetes mellitus  Dyslipidemia       POSTOPERATIVE DIAGNOSES:  Same     BRIEF HISTORY:    Holly Beal is a 69 year old year old woman who developed chest pressure with walking. An exercise stress test was abnormal and subsequent coronary angiography revealed severe triple vessel coronary artery disease.  The above procedures were planned.      FINDINGS AT OPERATION:  Her ascending aorta was normal in size and free of any plaque seen on epiaortic ultrasound.  Her left ventricular  function was excellent.  The left internal mammary artery was a 2 mm in diameter conduit of excellent quality.  The left radial artery was a 2.5 mm in diameter conduit of excellent quality.  The reversed saphenous vein graft measured 4 to 5 mm in diameter and was of good quality.  The right posterior descending coronary artery was a 2 mm in diameter target vessel, an excellent vessel for bypass grafting.  The obtuse marginal 3 was a 1.5 mm in diameter target vessel, a good vessel for bypass grafting.  The obtuse marginal 1 was a 2 mm in diameter target vessel, an excellent vessel for bypass grafting.  The left anterior descending diagonal branch was a 2 mm in diameter target vessel, an excellent vessel for bypass grafting.  The left anterior descending coronary artery in its apical third was a 1.5 mm in diameter target vessel, a good vessel for bypass grafting.  The patient weaned off cardiopulmonary bypass without difficulty.      PROCEDURES:  The patient arrived in the operating room and was positioned supine.  An arterial line was placed.  Satisfactory general endotracheal anesthesia was induced.  A transesophageal probe, central line and Bojorquez catheter were inserted.  The patient's neck, chest, abdomen, both groins and lower extremities, and the left upper extremity were prepped and draped in a standard sterile fashion.      A complete median sternotomy was made.  With the aid of the Rultract retractor, the left internal mammary artery was taken down from the xiphoid process to the subclavian vein.   At the same time Rosmery Payne made an incision in the left upper leg and 5 cm of the greater saphenous vein was exposed. The endoscope was then passed proximally and the greater saphenous vein was dissected out circumferentially.  Its branches were clipped or cauterized.  It was clipped proximally and distally and extracted. It was cannulated and distended.  Its branches were controlled with Ligaclips.  The leg wound  was rendered hemostatic and closed in layers using running 2-0 and 3-0 Vicryl.  Dermabond was applied to the skin.  The exact same procedure was done on the right lower extremity.   At the same time Vanesa Afia made an incision at the left wrist and the radial artery was dissected out circumferentially.  It was occluded and the palmar arch was noted to be intact.  Using endoscopic technique the radial artery was dissected out from the wrist to the elbow.  A second incision was made at the elbow.  The radial artery was divided proximally and distally.  It was prepared in the usual fashion.  The wrist and elbow wounds were rendered hemostatic and closed in layers using running 2-0 and 3-0 Vicryl as well as Dermabond on the skin.  The left upper extremity was tucked by Rosmery Payne.     Heparin was administered.  The left internal mammary artery was prepared in the usual fashion.  An Octobase retractor was inserted.  The pericardium was opened and tented up on pericardial sutures.  The aorta was  from the pulmonary artery.  Epiaortic ultrasound of the ascending aorta was carried out.  Pursestring sutures were placed in the ascending aorta and right atrium for cannulation.  When the ACT was appropriate cannulation was performed and central cardiopulmonary bypass was established.  The patient's temperature was allowed to drift.  A retrograde cardioplegia catheter was placed in the coronary sinus via the right atrium. The aorta was cross-clamped and 500 mL of cold blood cardioplegia was administered antegrade and an additional 700 mL of cold blood cardioplegia was administered retrograde.  A good arrest was achieved.  Cold topical saline and slush was applied to the heart intermittently during the period of aortic cross-clamp.      Attention was first turned to the right posterior descending coronary artery.  It was dissected out for a distance of 1 cm and then opened for a distance of 8 mm.  It was bypassed in  an end to side fashion using reversed saphenous vein graft and running 7-0 Prolene.  The vein graft was flushed with cold blood cardioplegia and sized to the ascending aorta.  The patient received 300 mL of cold blood cardioplegia in a retrograde fashion. Next attention was turned to the obtuse marginal 3 coronary artery.  It was dissected out for a distance of 1 cm and then opened for a distance of 8 mm.  It was bypassed in an end to side fashion using reversed saphenous vein graft and running 7-0 Prolene.  The vein graft was flushed with cold blood cardioplegia and sized to the ascending aorta and the patient received another 300 mL of cold blood cardioplegia in a retrograde fashion. Next attention was turned to the obtuse marginal one coronary artery.  It was dissected out for a distance of 1 cm and then opened for a distance of 8 mm.  It was bypassed in an end to side fashion using the distal end of the radial artery and running 7-0 Prolene.  The patient received another 300 mL of cold blood cardioplegia in a retrograde fashion.  Next attention was turned to the left anterior descending diagonal branch coronary artery.  It was dissected out for a distance of 1 cm and then opened for distance of 8 mm.  It was bypassed in an end to side fashion using reversed saphenous vein graft and running 7-0 Prolene.  The vein graft was flushed with cold blood cardioplegia and sized to the ascending aorta.  The patient received another 300 mL of cold blood cardioplegia in a retrograde fashion.  Finally attention was turned to the left anterior descending coronary artery in the interventricular groove.  It was dissected out in its apical third for a distance of 1 cm and then opened for a distance of 8 mm.  A pleural rent was created for passage of the left internal mammary artery and then the final distal anastomosis was performed between the left internal mammary artery and the left anterior descending coronary artery in an  end to side fashion using running 7-0 Prolene. As this last distal anastomosis was being performed gentle warming was begun. The gray occluder was briefly released from the left internal mammary artery and good flow was seen down the left anterior descending coronary artery. The gray occluder was once more applied to the left internal mammary artery and the patient received a final dose of cold blood cardioplegia in a retrograde fashion.  It had been decided to perform the 4 proximal anastomoses with the aortic cross-clamp in place.  Therefore 4,  4 mm punch aortotomies were created.  The proximal aortoradial and aortosaphenous anastomoses were performed in an end to side fashion using running 6-0 Prolene.     The gray occluder was released from the left internal mammary artery and a hot shot was delivered in a retrograde fashion.  The aortic cross-clamp was released.  The aortic cross-clamp time was 1 hour and 30 minutes.  The proximal and distal anastomoses were examined and found to be hemostatic. Ventricular and atrial pacing wires were applied and the patient was A-V sequentially aced at a rate of 90.  The retrograde cardioplegia catheter was removed and that site repaired with two sets of 4-0 Prolene.  The left and right pleural spaces were drained of any accumulated blood and gentle ventilation resumed. The root vent was removed and that site repaired with plegetted 4-0 Prolene.      The patient was placed on low dose epinephrine and cardene. When she was warm the heart was allowed to fill and eject and the patient  from cardiopulmonary bypass without difficulty. Total time on cardiopulmonary bypass was 1 hour and 42 minutes.  Protamine was administered to reverse the heparin. The patient was decannulated and the cannulation sites were repaired with 4-0 Prolene.  Hemostasis was satisfactory.    The drains were placed and secured to the skin. The sternum was approximated with a combination of single  and double stainless steel sternal wires.   The sternal wound was irrigated with warm antibiotic-containing solution.  It was closed in 4 layers using 0 Stratafix for 2 layers, 2-0 Stratafix for the next layer and a subcuticular stitch of 4-0 Monocryl.      The sponge, needle and instrument counts were reported as correct.  A chest x-ray was performed and failed to demonstrate any retained foreign body.    The estimated blood loss was 125 mL.      Holly Beal was brought to the Intensive Care Unit in critical but stable condition.    Complications: None     Rachel Fuentes MD on 10/25/2024 at 12:14 PM

## 2024-10-25 NOTE — PROGRESS NOTES
RT PROGRESS NOTE    VENT DAY  # 1    CURRENT SETTINGS:  FiO2 (%): 60 %, Resp: 16, Vent Mode: CMV/AC, Resp Rate (Set): 18 breaths/min, Tidal Volume (Set, mL): 500 mL, PEEP (cm H2O): 5 cmH2O, Resp Rate (Set): 18 breaths/min, Tidal Volume (Set, mL): 500 mL, PEEP (cm H2O): 5 cmH2O      ETT SIZE 7.0 Secured at 20 cm at teeth/gums          NOTE / SHIFT SUMMARY:       Pt has been received from OR around 13:15 pm and placed on vent per order. Pt seems to be stable well on current vent settings. Weaning trial will be initiated when appropriate.     Natasha Fung, RRT

## 2024-10-25 NOTE — ANESTHESIA PROCEDURE NOTES
Airway       Patient location during procedure: OR       Procedure Start/Stop Times: 10/25/2024 7:31 AM  Staff -        Performed By: CRNAIndications and Patient Condition       Indications for airway management: axel-procedural       Induction type:intravenous       Mask difficulty assessment: 1 - vent by mask    Final Airway Details       Final airway type: endotracheal airway       Successful airway: Single subglottic suction  Endotracheal Airway Details        ETT size (mm): 7.0       Cuffed: yes       Successful intubation technique: video laryngoscopy       VL Blade Size: Glidescope 3       Grade View of Cords: 1       Adjucts: stylet       Position: Right       Measured from: gums/teeth       Secured at (cm): 20       Bite block used: None    Post intubation assessment        Placement verified by: capnometry, equal breath sounds and chest rise        Number of attempts at approach: 1       Number of other approaches attempted: 0       Secured with: tape       Ease of procedure: easy       Dentition: Intact and Unchanged       Dental guard used and removed.    Medication(s) Administered   Medication Administration Time: 10/25/2024 7:31 AM

## 2024-10-25 NOTE — PLAN OF CARE
[facility] - ICU    RN Progress Note:            Pertinent Assessments:      Please refer to flowsheet rows for full assessment     - Alert and oriented, following commands  - Bicarbonate level from arterial blood gas was 19 and base excess was -3.0  - Cardiac index = 1.5 and SVR = 1500  - Ionized Calcium = 4.3           Key Events - This Shift:       - Came from OR at around 1314H, Amicar, Cardene, Precedex and insulin were the only infusions that were running.   - Sodium Bicarbonate 1 vial was given and Cardene was increased to 2.5 mg/hour as per Dr. Fuentes.  - Albumin 1 bag of 250 ml was given as ordered.   - Calcium Gluconate 1gram IV was given. Ionized Calcium level after the replacement was 4.5    CT EXTUBATION FAST TRACK:    [facility] - ICU     FastTrack Candidate: Yes, Extubation Goal Time ( 6 Hours ) 1914H     Patient Status: alert and oriented, no difficulty of breathing nor chest pain Extubated at 1853H               Barriers to Discharge / Downgrade:     - immediate post operative open heart surgery, for close observation and monitoring         Point of Contact Update:   Name: Tyler  Phone Number: in file  Summary of Conversation: updated regarding the plan of care and visiting hours

## 2024-10-25 NOTE — PLAN OF CARE
Problem: Adult Inpatient Plan of Care  Goal: Plan of Care Review  Description: The Plan of Care Review/Shift note should be completed every shift.  The Outcome Evaluation is a brief statement about your assessment that the patient is improving, declining, or no change.  This information will be displayed automatically on your shift  note.  Outcome: Progressing  Flowsheets (Taken 10/24/2024 0887)  Plan of Care Reviewed With:   patient   spouse   family     Problem: Pain Acute  Goal: Optimal Pain Control and Function  Outcome: Progressing  Intervention: Prevent or Manage Pain  Recent Flowsheet Documentation  Taken 10/24/2024 4692 by Sandie Stewart, RN  Sensory Stimulation Regulation: quiet environment promoted  Medication Review/Management: medications reviewed     Problem: Cardiovascular Surgery  Goal: Improved Activity Tolerance  Outcome: Progressing  Goal: Optimal Coping with Heart Surgery  Outcome: Progressing   Goal Outcome Evaluation:      Plan of Care Reviewed With: patient, spouse, family  Holly had a good shift. Lots of family here today. Lots of questions about procedures tomorrow. Extensive education provided. Feeling much more comfortable now with procedure plan. Heparin drip continues as ordered. No complaints of chest pain or shortness of breath. Angio prep done

## 2024-10-25 NOTE — PLAN OF CARE
Goal Outcome Evaluation:       No acute issues overnight. Pt denied any chest pain. Heparin drip stopped at 0436 to take a shower prepping for CABG scheduled at 0710. Pt showered, new gown and socks on, bed linen changed. Sacral mepilex place. FSBG 142.

## 2024-10-25 NOTE — ANESTHESIA PROCEDURE NOTES
Central Line/PA Catheter Placement    Pre-Procedure   Staff -        Anesthesiologist:  Catracho Chu MD       Performed By: anesthesiologist       Location: OR       Pre-Anesthestic Checklist: patient identified, IV checked, site marked, risks and benefits discussed, informed consent, monitors and equipment checked, pre-op evaluation and at physician/surgeon's request  Timeout:       Correct Patient: Yes        Correct Procedure: Yes        Correct Site: Yes        Correct Position: Yes        Correct Laterality: Yes   Line Placement:   This line was placed Post Induction starting at 10/25/2024 7:50 AM and ending at 10/25/2024 7:57 AM    Procedure   Procedure: central line       Laterality: right       Insertion Site: internal jugular.       Patient Position: Trendelenburg  Sterile Prep        All elements of maximal sterile barrier technique followed       Patient Prep/Sterile Barriers: draped, hand hygiene, gloves , hat , mask , draped, gown, sterile gel and probe cover       Skin prep: Chloraprep  Insertion/Injection        Technique: ultrasound guided and Seldinger Technique        1. Ultrasound was used to evaluate the access site.       2. Vein evaluated via ultrasound for patency/adequacy.       3. Using real-time ultrasound the needle/catheter was observed entering the artery/vein.       4. Permanent image was captured and entered into the patient's record.       5. The visualized structures were anatomically normal.       6. There were no apparent abnormal pathologic findings.       Introducer Type: 9 Fr, 2-lumen MAC        Type: PA/CVC with Introducer       Catheter Size: 9 Fr       Catheter Length: 11.5       Number of Lumens: double lumen  Narrative         Secured by: suture       Tegaderm dressing used.       Complications: None apparent,        blood aspirated from all lumens,        All lumens flushed: Yes       Verification method: Placement to be verified post-op       Tip termination: right  atrium   Comments:  CVL placed in RIJ and threaded to reside in the RA    Double lumen slick easily advanced into introducer port and locked at 20cm.

## 2024-10-25 NOTE — ANESTHESIA CARE TRANSFER NOTE
Patient: Holly Beal    Procedure: Procedure(s):  CORONARY ARTERY BYPASS GRAFT TIMES FIVE, LEFT INTERNAL MAMMARY ARTERY HARVEST, LEFT RADIAL ARTERY HARVEST, LEFT AND RIGHT LEG ENDOSCOPIC VESSEL PROCUREMENT, EPIAORTIC ULTRASOUND, ANESTHESIA TRANSESOPHAGEAL ECHOCARDIOGRAM       Diagnosis: CAD (coronary artery disease) [I25.10]  Diagnosis Additional Information: No value filed.    Anesthesia Type:   General     Note:    Oropharynx: endotracheal tube in place  Level of Consciousness: unresponsive    Level of Supplemental Oxygen (L/min / FiO2): 40  Independent Airway: airway patency not satisfactory and stable  Dentition: dentition unchanged  Vital Signs Stable: post-procedure vital signs reviewed and stable  Report to RN Given: handoff report given  Patient transferred to: ICU    ICU Handoff: Call for PAUSE to initiate/utilize ICU HANDOFF, Identified Patient, Identified Responsible Provider, Reviewed the Pertinent Medical History, Discussed Surgical Course, Reviewed Intra-OP Anesthesia Management and Issues during Anesthesia, Set Expectations for Post Procedure Period and Allowed Opportunity for Questions and Acknowledgement of Understanding      Vitals:  Vitals Value Taken Time   /78 10/25/24 1321   Temp 36  C (96.8  F) 10/25/24 1321   Pulse 89 10/25/24 1321   Resp 16 10/25/24 1321   SpO2 100 % 10/25/24 1321       Electronically Signed By: LB Guzman CRNA  October 25, 2024  1:21 PM

## 2024-10-25 NOTE — ANESTHESIA PROCEDURE NOTES
Arterial Line Procedure Note    Pre-Procedure   Staff -        Anesthesiologist:  Catracho Chu MD       Performed By: anesthesiologist       Location: OR       Pre-Anesthestic Checklist: patient identified, IV checked, risks and benefits discussed, informed consent, monitors and equipment checked, pre-op evaluation and at physician/surgeon's request  Timeout:       Correct Patient: Yes        Correct Procedure: Yes        Correct Site: Yes        Correct Position: Yes   Line Placement:   This line was placed Post Induction starting at 10/25/2024 12:51 PM and ending at 10/25/2024 12:57 PM  Procedure   Procedure: arterial line       Diagnosis: Atherosclerosis       Laterality: right       Insertion Site: brachial.  Sterile Prep        Standard elements of sterile barrier followed       Skin prep: Chloraprep  Insertion/Injection        Technique: ultrasound guided and Seldinger Technique        1. Ultrasound was used to evaluate the access site.       2. Artery evaluated via ultrasound for patency/adequacy.       3. Using real-time ultrasound the needle/catheter was observed entering the artery/vein.       4. Permanent image was captured and entered into the patient's record.       Catheter Type/Size: 20 G, 12 cm  Narrative         Secured by: suture       Tegaderm dressing used.       Complications: None apparent,        Arterial waveform: Yes

## 2024-10-25 NOTE — ANESTHESIA PROCEDURE NOTES
Arterial Line Procedure Note    Pre-Procedure   Staff -        Anesthesiologist:  Catracho Chu MD       Performed By: anesthesiologist       Location: OR       Pre-Anesthestic Checklist: patient identified, IV checked, risks and benefits discussed, informed consent, monitors and equipment checked, pre-op evaluation and at physician/surgeon's request  Timeout:       Correct Patient: Yes        Correct Procedure: Yes        Correct Site: Yes        Correct Position: Yes   Line Placement:   This line was placed Pre Induction starting at 10/25/2024 7:20 AM and ending at 10/25/2024 7:22 AM  Procedure   Procedure: arterial line       Diagnosis: Atherosclerosis       Laterality: right       Insertion Site: radial.  Sterile Prep        Standard elements of sterile barrier followed       Skin prep: Chloraprep  Insertion/Injection        Technique: ultrasound guided and Seldinger Technique        1. Ultrasound was used to evaluate the access site.       2. Artery evaluated via ultrasound for patency/adequacy.       3. Using real-time ultrasound the needle/catheter was observed entering the artery/vein.       4. Permanent image was captured and entered into the patient's record.       5. The visualized structures were anatomically normal.       6. There were no apparent abnormal pathologic findings.       Catheter Type/Size: 20 G, 12 cm  Narrative         Secured by: suture       Tegaderm dressing used.       Complications: None apparent,        Arterial waveform: Yes

## 2024-10-25 NOTE — ANESTHESIA POSTPROCEDURE EVALUATION
Patient: Holly Beal    Procedure: Procedure(s):  CORONARY ARTERY BYPASS GRAFT TIMES FIVE, LEFT INTERNAL MAMMARY ARTERY HARVEST, LEFT RADIAL ARTERY HARVEST, LEFT AND RIGHT LEG ENDOSCOPIC VESSEL PROCUREMENT, EPIAORTIC ULTRASOUND, ANESTHESIA TRANSESOPHAGEAL ECHOCARDIOGRAM       Anesthesia Type:  General    Note:  Disposition: ICU            ICU Sign Out: Anesthesiologist/ICU physician sign out WAS performed   Postop Pain Control: Uneventful            Sign Out: Well controlled pain   PONV: No   Neuro/Psych: Uneventful            Sign Out: Acceptable/Baseline neuro status   Airway/Respiratory: Uneventful            Sign Out: AIRWAY IN SITU/Resp. Support               Airway in situ/Resp. Support: ETT                 Reason: Planned Pre-op   CV/Hemodynamics: Uneventful            Sign Out: Acceptable CV status; No obvious hypovolemia; No obvious fluid overload   Other NRE: NONE   DID A NON-ROUTINE EVENT OCCUR? No    Event details/Postop Comments:  Patient transported to the ICU intubated/sedated. Formal handoff given to ICU provider at bedside.           Last vitals:  Vitals:    10/25/24 0633 10/25/24 1315 10/25/24 1321   BP: (!) 178/76  (!) 141/78   Pulse: 62 89 89   Resp: 18 18 16   Temp: 37  C (98.6  F)  36  C (96.8  F)   SpO2: 98% 100% 100%       Electronically Signed By: Catracho Chu MD  October 25, 2024  2:14 PM

## 2024-10-25 NOTE — CONSULTS
"    ICU Daily Note (10/25/2024)     Date of Hospital Admission: 10/23/2024  Date of ICU Admission: 10/25/2024  Code Status: Full Code    Reason for Visit  Post CABG    Summary  69 year old old female with HTN, HL, DM2, accelerating angina who had an abnormal stress test leading to a coronary angiogram.  Coronary angiogram demonstrated multi-vessel CAD (proximal LAD 95%, OM1 70%, OM3 70%, rPDA 70%).  She underwent CABG X 5 by Dr. Fuentes earlier today. She arrives to the ICU post op intubated.     The surgery was uncomplicated.  Both pre and post cab echo showed normal ejection fraction.  The patient had a trivial AR and a trace MR.  Her central line was changed mid procedure.  Also, the patient had a right radial arterial line that was changed to a right brachial arterial line during the case.  CVP is currently 8-10.  The patient is AV paced at 90.  She is on a nicardipine infusion.  Off inotropes and vasopressors.  She received 250 mL of albumin and 135 of Cell Saver during the case.    Subjective  Seen and examined shortly after arrival to the intensive care unit.  She is intubated.    Objective   Vital Signs  Blood pressure (!) 141/78, pulse 89, temperature 96.8  F (36  C), temperature source Temporal, resp. rate 16, height 1.676 m (5' 6\"), weight 70.6 kg (155 lb 9.6 oz), SpO2 100%.  I/O last 3 completed shifts:  In: 600 [P.O.:600]  Out: 1300 [Urine:1300]  FiO2 (%): 60 %, Resp: 16, Vent Mode: CMV/AC, Resp Rate (Set): 18 breaths/min, Tidal Volume (Set, mL): 500 mL, PEEP (cm H2O): 5 cmH2O, Resp Rate (Set): 18 breaths/min, Tidal Volume (Set, mL): 500 mL, PEEP (cm H2O): 5 cmH2O    Physical Exam   General:  Intubated  Head:  normocephalic, atraumatic    Eyes: conjunctiva clear, PERRL.   Throat:  Orally intubated. No erythema, exudates or lesions.   Neck:  Supple, no masses  Heart:  RRR, no murmur   Lungs:  Coarse bilaterally. .   Abdomen:  Soft, NT/ND, no HSM, no masses.   Extremities: No deformities, clubbing, " cyanosis, or edema.   Neurologic: Intubated, and sedated, moving all extremities.     Diagnostic Data  The following portions of the patient's chart were reviewed: current medications, problem list, laboratory workup, and diagnostic data.    Most Recent Pertinent Labs  Lab Results   Component Value Date    WBC 19.0 (H) 10/25/2024    HGB 10.4 (L) 10/25/2024    HCT 31.6 (L) 10/25/2024     10/25/2024     10/25/2024    POTASSIUM 4.6 10/25/2024    CHLORIDE 102 10/25/2024    CO2 25 10/25/2024    BUN 12.6 10/25/2024    CR 0.68 10/25/2024     (H) 10/25/2024    AST 16 10/24/2024    ALT 12 10/24/2024    ALKPHOS 67 10/24/2024    INR 1.43 (H) 10/25/2024     Infusion Medications  Current Facility-Administered Medications   Medication Dose Route Frequency Provider Last Rate Last Admin    aminocaproic acid (AMICAR) 5 g in sodium chloride 0.9 % 100 mL infusion  1 g/hr Intravenous Continuous Vanesa Oreilly PA-C 20 mL/hr at 10/25/24 1332 1 g/hr at 10/25/24 1332    dexmedeTOMIDine (PRECEDEX) 4 mcg/mL in sodium chloride 0.9 % 100 mL infusion  0.1-1.2 mcg/kg/hr Intravenous Continuous Vanesa Oreilly PA-C 12.4 mL/hr at 10/25/24 1333 0.7 mcg/kg/hr at 10/25/24 1333    EPINEPHrine (ADRENALIN) 5 mg in sodium chloride 0.9 % 250 mL infusion CENTRAL  0.01-0.1 mcg/kg/min Intravenous Continuous PRN Vanesa Oreilly PA-C        insulin regular (MYXREDLIN) 1 unit/mL infusion  0-24 Units/hr Intravenous Continuous Vanesa Oreilly PA-C        niCARdipine 40 mg in 200 mL NS (CARDENE) infusion  0.5-15 mg/hr Intravenous Continuous PRN Vanesa Oreilly PA-C        phenylephrine (PEYTON-SYNEPHRINE) 50 mg in NaCl 0.9 % 250 mL infusion  0.1-4 mcg/kg/min Intravenous Continuous PRN Vanesa Oreilly PA-C         Scheduled Medications  Current Facility-Administered Medications   Medication Dose Route Frequency Provider Last Rate Last Admin    acetaminophen (TYLENOL) Suppository 650 mg  650 mg Rectal  Q8H Vanesa Oreilly PA-C        acetaminophen (TYLENOL) tablet 975 mg  975 mg Oral Q8H Vanesa Oreilly PA-C        aspirin (ASA) chewable tablet 162 mg  162 mg Oral or NG Tube Once Vanesa Oreilly PA-C        Or    aspirin (ASA) Suppository 300 mg  300 mg Rectal Once Vanesa Oreilly PA-C        [START ON 10/26/2024] aspirin (ASA) chewable tablet 162 mg  162 mg Oral or NG Tube Daily Vanesa Oreilly PA-C        Or    [START ON 10/26/2024] aspirin (ASA) Suppository 300 mg  300 mg Rectal Daily Vanesa Oreilly PA-C        ceFAZolin (ANCEF) 1 g vial to attach to  ml bag for ADULT or 50 ml bag for PEDS  1 g Intravenous Q8H Vanesa Oreilly PA-C        cyanocobalamin (VITAMIN B-12) tablet 1,000 mcg  1,000 mcg Oral Daily Vanesa Oreilly PA-C   1,000 mcg at 10/24/24 0837    [START ON 10/26/2024] heparin ANTICOAGULANT injection 5,000 Units  5,000 Units Subcutaneous Q8H Vanesa Oreilly PA-C        Lidocaine (LIDOCARE) 4 % Patch 1-2 patch  1-2 patch Transdermal Q24H Vanesa Oreilly PA-C        [Held by provider] metFORMIN (GLUCOPHAGE XR) 24 hr tablet 500 mg  500 mg Oral Daily with supper Vanesa Oreilly PA-C        pantoprazole (PROTONIX) 2 mg/mL suspension 40 mg  40 mg Oral or NG Tube Daily Vanesa Oreilly PA-C        Or    pantoprazole (PROTONIX) EC tablet 40 mg  40 mg Oral Daily Vanesa Oreilly PA-C        [START ON 10/26/2024] polyethylene glycol (MIRALAX) Packet 17 g  17 g Oral Daily Vanesa Oreilly PA-C        rosuvastatin (CRESTOR) tablet 40 mg  40 mg Oral Daily Vanesa Oreilly PA-C   40 mg at 10/24/24 2023    senna-docusate (SENOKOT-S/PERICOLACE) 8.6-50 MG per tablet 1 tablet  1 tablet Oral BID Vanesa Oreilly PA-C          Restraint Application  I recognize that restraints are physical and/or chemical interventions intended to restrict a person's movements. Restraints are currently needed  to ensure the safety of this patient and/or others. My clinical rationale appears below:  Category/Type of Restraint: Non Violent:  Soft limb restraint x 2  Behavior: Pulling at tubes/lines  Root Cause of the Behavior: Sedation/intubation  Less-Restrictive Measures that Failed: Non Violent Measures:  Close Observation  Response to the Restraint: Patient unable to pull at tubes/lines  Criteria for Release from the Restraint: Patient calm and off sedation    Assessment & Plan   Active Problems:    Calcium kidney stone    Kidney stone on left side    Asthma    Abnormal stress test    Status post coronary angiogram    Chest pain    Accelerating angina (H)    Mild intermittent asthma without complication    Neurology  # Sedation and Analgesia  - dexmedetomidine infusion, wean for neurologic examination   - Per CTS, including Precedex & PRNs.  - plan for extubation within six hours of arrival in the ICU as able      Cardiovascular:  # Multivessel coronary artery disease status post CABG  - antiplatelet & statins per CV surgery        # Post-cardiopulmonary bypass vasoplegia    - Preserved pre & post-biventricular systolic function   - hemodynamic management per discussion with CV surgery  - MAP 60-90, SBP , epinephrine & phenylephrine are available, however, she is currently not requiring any inotropic or vasopressor support.    # Temporary epicardial pacing wires  -She is AV paced at 90 bpm.     Respiratory, Airway:  # Post-operative mechanical ventilation   - radiograph demonstrated ETT & CVC in good position  - lung protective ventilation (TV 6-8 mL/kg IBW, Pplat <30, driving pressure <15)   - Goal SpO2 >/= 92%, wean supplemental oxygen as tolerated.   - HOB elevation > 30 degrees to minimize aspiration risk.   - Continuous EtCO2 while intubated.  - enhanced recovery after cardiac surgery / fast-track may extubate within six hours   - pulmonary hygiene: acapella, IS, PT/OT as able, OOB as able      #  Post-operative chest tubes  - Chest tubes present to drain pleural & mediastinal fluid placed to wall suction       Gastrointestinal:  - NPO until extubated & fully awake.    - No active issues   - On pantoprazole for GI prophylaxis    Renal  - Monitor kidney function, urine output, and electrolytes.     Infectious Disease:  Ariadna-operative antibiotics per CV surgery      Hematology, Oncology:  # Post-operative anemia  # Risk for post-cardiac bypass coagulopathy   # Risk for post-cardiac bypass thrombocytopenia   - Close CT output monitoring, CVS to be notified w/ excessive or abruptly absent output  - monitor for bleeding: Hgb goal >7-8  - Anticipate fall in platelet count after cardiopulmonary bypass in most patients, typically to levels approximately half of baseline, laure between postoperative days 2-4, & persists for 4 - 6 days following surgery.     Endocrine:  # MICU insulin/glucose protocol   - maintain BG of 140 to 180 mg/dL with a continuous IV insulin infusion    Total time is 80 minutes.    Joseph Leahy MD  Pager [phone]  [employer] Web ([employer])   Vocera Web Console ([email or web address])

## 2024-10-25 NOTE — BRIEF OP NOTE
[facility]    Brief Operative Note    Pre-operative diagnosis: CAD (coronary artery disease) [I25.10]  Post-operative diagnosis Same as pre-operative diagnosis    Procedure: CORONARY ARTERY BYPASS GRAFT TIMES FIVE, LEFT INTERNAL MAMMARY ARTERY HARVEST, LEFT RADIAL ARTERY HARVEST, LEFT AND RIGHT LEG ENDOSCOPIC VESSEL PROCUREMENT, EPIAORTIC ULTRASOUND, ANESTHESIA TRANSESOPHAGEAL ECHOCARDIOGRAM, N/A - Chest    Surgeon: Surgeons and Role:     * Rachel Fuentes MD - Primary     * Raymundo Melendez MD - Assisting  Anesthesia: General   Estimated Blood Loss: 125 ml    Drains:  2x mediastinal, 1x left pleural CT    Specimens: * No specimens in log *  Findings:   CABG 5v. LIMA to LAD, Radial to OM1, SVG to OM3, Diag, PDA  .  Complications: None.  Implants: * No implants in log *

## 2024-10-25 NOTE — ANESTHESIA PROCEDURE NOTES
Perioperative LORRIE Procedure Note    Staff -        Anesthesiologist:  Catracho Chu MD       Performed By: anesthesiologist  Preanesthesia Checklist:  Patient identified, IV assessed, risks and benefits discussed, monitors and equipment assessed, procedure being performed at surgeon's request and anesthesia consent obtained.    LORRIE Probe Insertion    Probe Status PRE Insertion: NO obvious damage  Probe type:  Adult 3D  Bite block used:   Soft  Insertion Technique: Jaw Lift  Insertion complications: None obvious  Billing Report:A LORRIE report is NOT being generated.  Probe Status POST Removal: NO obvious damage  Comments: LORRIE probe easily placed with one attempt.

## 2024-10-25 NOTE — PROGRESS NOTES
[facility]    Medicine Progress Note - Hospitalist Service    Date of Admission:  10/23/2024    Assessment & Plan   69 year old old female with HTN, HL, DM2, accelerating angina who had an abnormal stress test leading to a coronary angiogram today.  Coronary angiogram demonstrated multi-vessel CAD.  CV surgery consulted.     Multi-vessel CAD  -- Coronary angiogram completed on 10/23 which showed:   LM: no obstruction  LAD:proximal 95% narrowing  Lcx:OM1 70%, OM3 70% narrowing  RCA:dominant, 30% proximal, 70% rPDA narrowing  -- ASA, Home Crestor increased to 40mg every day  -- S/p CABG x5- LIMA to LAD, Radial to OM1, rSVG to Diag, OM 3 and PDA on 10/25  -- Postop cares per CV Surgery  -- Off of precedex. Intubated. Planned extubation this afternoon per Intensivist.    Post op anemia  -- Monitor CBC    Essential HTN  -- PTA Lisinopril. Carvedilol started on 10/24. Currently NPO while intubated and Close BP management per CV Surgery.  -- Monitor vital signs per protocol  -- Hydralazin iv prn per CV surgery    DM 2  -- Continue to hold Metformin  -- IMICU insulin/glucose protocol  -- A1c: 7.1%    HLD  -- Continue Increased Crestor at 40mg every day with goal of LDL<70    Mild intermittent asthma w/o acute exacerbation  -- Albuterol inhaler prn    GERD  -- PTA Omeprazole    Anxiety d/o  -- PTA Alprazolam prn          Diet: Advance Diet as Tolerated: Clear Liquid Diet    DVT Prophylaxis: Heparin gtt  Bojorquez Catheter: PRESENT, indication: ?  (Error. Value could not be saved.)  Lines: PRESENT           Cardiac Monitoring: ACTIVE order. Indication: Open heart surgery (72 hours)  Code Status: Full Code      Clinically Significant Risk Factors        # Hyperkalemia: Highest K = 5.7 mmol/L in last 2 days, will monitor as appropriate   # Hyperchloremia: Highest Cl = 110 mmol/L in last 2 days, will monitor as appropriate      # Hypocalcemia: Lowest Ca = 8.2 mg/dL in last 2 days, will monitor and replace as  "appropriate  # Hypercalcemia: Highest Ca = 10.6 mg/dL in last 2 days, will monitor as appropriate     # Coagulation Defect: INR = 1.31 (Ref range: 0.85 - 1.15) and/or PTT = 66 Seconds (Ref range: 22 - 38 Seconds), will monitor for bleeding             # DMII: A1C = 7.1 % (Ref range: <5.7 %) within past 6 months, PRESENT ON ADMISSION  # Overweight: Estimated body mass index is 25.11 kg/m  as calculated from the following:    Height as of this encounter: 1.676 m (5' 6\").    Weight as of this encounter: 70.6 kg (155 lb 9.6 oz)., PRESENT ON ADMISSION     # Asthma: noted on problem list        Disposition Plan     Medically Ready for Discharge: Anticipated in 2-4 Days             Ayleen Copeland MD  Hospitalist Service  [facility]  Securely message with [employer] (more info)  Text page via [employer] Paging/Directory   ______________________________________________________________________    Interval History   Patient is awake and is off of sedation  Plan of care discussed with family.   Physical Exam   Vital Signs: Temp: 96.8  F (36  C) Temp src: Temporal BP: (!) 141/78 Pulse: 89   Resp: 16 SpO2: 100 % O2 Device: (S) Mechanical Ventilator    Weight: 155 lbs 9.6 oz    GEN: Awake. off of sedation. Intubated  HEENT: Atraumatic, mucous membrane- moist and pink.  Chest: Coarse b/l air entry.  CVS: S1S2 regular.   Abdomen: Soft. Non-tender, non-distended. No organomegaly. No guarding or rigidity. Bowel sounds active.   Extremities: No pedal edema.  CNS: No involuntary movements.  Skin: no cyanosis or clubbing.     Medical Decision Making       54 MINUTES SPENT BY ME on the date of service doing chart review, history, exam, documentation & further activities per the note.      Data     "

## 2024-10-25 NOTE — ANESTHESIA PREPROCEDURE EVALUATION
Anesthesia Pre-Procedure Evaluation    Patient: Holly Beal   MRN: [id number] : 1955        Procedure : Procedure(s):  CORONARY ARTERY BYPASS GRAFT  , INTERNAL MAMMARY ARTERY HARVEST, ENDOSCOPIC VESSEL PROCUREMENT  ECHOCARDIOGRAM, TRANSESOPHAGEAL, INTRAOPERATIVE  POSSIBLE LEFT RADIAL ARTERY HARVEST          Past Medical History:   Diagnosis Date    Asthma     Diabetes mellitus, type 2 (H)       Past Surgical History:   Procedure Laterality Date     SECTION  1986,88    CV CORONARY ANGIOGRAM N/A 10/23/2024    Procedure: Coronary Angiogram;  Surgeon: Tyrone Pendleton MD;  Location: [facility] CATH [facility] CV    CV LEFT HEART CATH N/A 10/23/2024    Procedure: Left Heart Catheterization;  Surgeon: Tyrone Pendleton MD;  Location: [facility] CATH LAB CV    IR NEPHROLITHOTOMY  10/22/2015    OTHER SURGICAL HISTORY      Esophagus line surgeryremoval of guide wire      Allergies   Allergen Reactions    Atorvastatin Nausea    Simvastatin Nausea      Social History     Tobacco Use    Smoking status: Never    Smokeless tobacco: Not on file   Substance Use Topics    Alcohol use: Yes     Comment: Alcoholic Drinks/day: rare      Wt Readings from Last 1 Encounters:   10/25/24 70.6 kg (155 lb 9.6 oz)        Anesthesia Evaluation   Pt has had prior anesthetic.         ROS/MED HX  ENT/Pulmonary:     (+)                      asthma                  Neurologic:  - neg neurologic ROS     Cardiovascular: Comment: EKG 10/23/24:  Sinus rhythm  Left anterior fascicular block  Left ventricular hypertrophy with QRS widening and repolarization abnormality  Cannot rule out Septal infarct , age undetermined  Abnormal ECG  No previous ECGs available  Confirmed by JENNIFER LAND MD LOC:JN ([id number]) on 10/23/2024 1:12:39 PM       (+)  - -  CAD -  - -                                      METS/Exercise Tolerance: >4 METS    Hematologic:  - neg hematologic  ROS     Musculoskeletal:  - neg musculoskeletal ROS     GI/Hepatic:  - neg  "GI/hepatic ROS     Renal/Genitourinary:     (+) renal disease, type: CRI,            Endo:     (+)  type II DM,                    Psychiatric/Substance Use:  - neg psychiatric ROS     Infectious Disease:  - neg infectious disease ROS     Malignancy:  - neg malignancy ROS     Other:  - neg other ROS          Physical Exam    Airway        Mallampati: II   TM distance: > 3 FB   Neck ROM: full   Mouth opening: > 3 cm    Respiratory Devices and Support         Dental  no notable dental history     (+) Modest Abnormalities - crowns, retainers, 1 or 2 missing teeth      Cardiovascular          Rhythm and rate: regular and normal     Pulmonary           breath sounds clear to auscultation           OUTSIDE LABS:  CBC:   Lab Results   Component Value Date    WBC 8.4 10/25/2024    WBC 9.1 10/24/2024    HGB 15.1 10/25/2024    HGB 13.8 10/24/2024    HCT 48.6 (H) 10/25/2024    HCT 42.8 10/24/2024     10/25/2024     10/24/2024     BMP:   Lab Results   Component Value Date     10/25/2024     10/24/2024    POTASSIUM 4.0 10/25/2024    POTASSIUM 4.0 10/24/2024    CHLORIDE 102 10/25/2024    CHLORIDE 107 10/24/2024    CO2 25 10/25/2024    CO2 27 10/24/2024    BUN 12.6 10/25/2024    BUN 10.3 10/24/2024    CR 0.68 10/25/2024    CR 0.77 10/24/2024     (H) 10/25/2024     (H) 10/25/2024     COAGS:   Lab Results   Component Value Date    PTT 57 (H) 10/24/2024    INR 1.06 10/24/2024     POC: No results found for: \"BGM\", \"HCG\", \"HCGS\"  HEPATIC:   Lab Results   Component Value Date    ALBUMIN 4.1 10/24/2024    PROTTOTAL 6.7 10/24/2024    ALT 12 10/24/2024    AST 16 10/24/2024    ALKPHOS 67 10/24/2024    BILITOTAL 1.1 10/24/2024     OTHER:   Lab Results   Component Value Date    A1C 7.1 (H) 10/23/2024    SHAQUILLE 10.6 (H) 10/25/2024    PHOS 3.0 04/23/2019    MAG 2.0 10/24/2024    TSH 1.97 11/07/2023       Anesthesia Plan    ASA Status:  3    NPO Status:  NPO Appropriate    Anesthesia Type: General.     - " "Airway: ETT   Induction: Intravenous, Propofol.   Maintenance: Balanced.   Techniques and Equipment:     - Lines/Monitors: 2nd IV, Arterial Line, Central Line, CVP, BIS, LORRIE            LORRIE Absolute Contra-indication: NONE     - Blood: PRBC     Consents    Anesthesia Plan(s) and associated risks, benefits, and realistic alternatives discussed. Questions answered and patient/representative(s) expressed understanding.     - Discussed: Risks, Benefits and Alternatives for BOTH SEDATION and the PROCEDURE were discussed     - Discussed with:  Patient       - Patient is DNR/DNI Status: No          Postoperative Care    Pain management: IV analgesics, Oral pain medications.   PONV prophylaxis: Ondansetron (or other 5HT-3), Dexamethasone or Solumedrol     Comments:    Other Comments: Discussed the risks and benefits of anesthesia with the patient.  Plan for a pre-induction arterial line.  Post-induction CVC.  Epinephrine, phenylephrine and nicardipine gtt in line with norepinephrine and vasopressin readily available.  Dexmedetomidine 0.5 mcg/kg/min infusion and methadone 0.3 mg/kg IV bolus with induction.  LORRIE placement and monitoring.  Discussed potential needs for blood product transfusion.  Discussed plan to transport postoperatively to ICU intubated and sedated.              Catracho Chu MD    I have reviewed the pertinent notes and labs in the chart from the past 30 days and (re)examined the patient.  Any updates or changes from those notes are reflected in this note.        # Hypercalcemia: Highest Ca = 10.6 mg/dL in last 2 days, will monitor as appropriate                 # DMII: A1C = 7.1 % (Ref range: <5.7 %) within past 6 months, PRESENT ON ADMISSION  # Overweight: Estimated body mass index is 25.11 kg/m  as calculated from the following:    Height as of this encounter: 1.676 m (5' 6\").    Weight as of this encounter: 70.6 kg (155 lb 9.6 oz)., PRESENT ON ADMISSION     # Asthma: noted on problem list       "

## 2024-10-26 ENCOUNTER — APPOINTMENT (OUTPATIENT)
Dept: OCCUPATIONAL THERAPY | Facility: HOSPITAL | Age: 69
DRG: 234 | End: 2024-10-26
Attending: STUDENT IN AN ORGANIZED HEALTH CARE EDUCATION/TRAINING PROGRAM
Payer: COMMERCIAL

## 2024-10-26 ENCOUNTER — APPOINTMENT (OUTPATIENT)
Dept: RADIOLOGY | Facility: HOSPITAL | Age: 69
DRG: 234 | End: 2024-10-26
Attending: PHYSICIAN ASSISTANT
Payer: COMMERCIAL

## 2024-10-26 LAB
ATRIAL RATE - MUSE: 84 BPM
CA-I BLD-MCNC: 4.5 MG/DL (ref 4.4–5.2)
DIASTOLIC BLOOD PRESSURE - MUSE: NORMAL MMHG
ERYTHROCYTE [DISTWIDTH] IN BLOOD BY AUTOMATED COUNT: 12.5 % (ref 10–15)
GLUCOSE BLDC GLUCOMTR-MCNC: 119 MG/DL (ref 70–99)
GLUCOSE BLDC GLUCOMTR-MCNC: 121 MG/DL (ref 70–99)
GLUCOSE BLDC GLUCOMTR-MCNC: 133 MG/DL (ref 70–99)
GLUCOSE BLDC GLUCOMTR-MCNC: 142 MG/DL (ref 70–99)
GLUCOSE BLDC GLUCOMTR-MCNC: 157 MG/DL (ref 70–99)
GLUCOSE BLDC GLUCOMTR-MCNC: 160 MG/DL (ref 70–99)
GLUCOSE BLDC GLUCOMTR-MCNC: 162 MG/DL (ref 70–99)
GLUCOSE BLDC GLUCOMTR-MCNC: 180 MG/DL (ref 70–99)
GLUCOSE BLDC GLUCOMTR-MCNC: 224 MG/DL (ref 70–99)
HCT VFR BLD AUTO: 28.3 % (ref 35–47)
HGB BLD-MCNC: 8.9 G/DL (ref 11.7–15.7)
INTERPRETATION ECG - MUSE: NORMAL
MAGNESIUM SERPL-MCNC: 2.6 MG/DL (ref 1.7–2.3)
MCH RBC QN AUTO: 28.8 PG (ref 26.5–33)
MCHC RBC AUTO-ENTMCNC: 31.4 G/DL (ref 31.5–36.5)
MCV RBC AUTO: 92 FL (ref 78–100)
P AXIS - MUSE: 65 DEGREES
PHOSPHATE SERPL-MCNC: 4.2 MG/DL (ref 2.5–4.5)
PLATELET # BLD AUTO: 202 10E3/UL (ref 150–450)
POTASSIUM SERPL-SCNC: 4 MMOL/L (ref 3.4–5.3)
PR INTERVAL - MUSE: 156 MS
QRS DURATION - MUSE: 126 MS
QT - MUSE: 424 MS
QTC - MUSE: 501 MS
R AXIS - MUSE: -47 DEGREES
RBC # BLD AUTO: 3.09 10E6/UL (ref 3.8–5.2)
SYSTOLIC BLOOD PRESSURE - MUSE: NORMAL MMHG
T AXIS - MUSE: 49 DEGREES
VENTRICULAR RATE- MUSE: 84 BPM
WBC # BLD AUTO: 15 10E3/UL (ref 4–11)

## 2024-10-26 PROCEDURE — 250N000013 HC RX MED GY IP 250 OP 250 PS 637: Performed by: PHYSICIAN ASSISTANT

## 2024-10-26 PROCEDURE — 93010 ELECTROCARDIOGRAM REPORT: CPT | Performed by: INTERNAL MEDICINE

## 2024-10-26 PROCEDURE — 999N000156 HC STATISTIC RCP CONSULT EA 30 MIN

## 2024-10-26 PROCEDURE — 999N000054 HC STATISTIC EKG NON-CHARGEABLE

## 2024-10-26 PROCEDURE — 93005 ELECTROCARDIOGRAM TRACING: CPT

## 2024-10-26 PROCEDURE — 250N000011 HC RX IP 250 OP 636: Performed by: PHYSICIAN ASSISTANT

## 2024-10-26 PROCEDURE — 94799 UNLISTED PULMONARY SVC/PX: CPT

## 2024-10-26 PROCEDURE — 250N000013 HC RX MED GY IP 250 OP 250 PS 637: Performed by: STUDENT IN AN ORGANIZED HEALTH CARE EDUCATION/TRAINING PROGRAM

## 2024-10-26 PROCEDURE — 999N000287 HC ICU ADULT ROUNDING, EACH 10 MINS

## 2024-10-26 PROCEDURE — 250N000012 HC RX MED GY IP 250 OP 636 PS 637: Performed by: PHYSICIAN ASSISTANT

## 2024-10-26 PROCEDURE — 82330 ASSAY OF CALCIUM: CPT | Performed by: PHYSICIAN ASSISTANT

## 2024-10-26 PROCEDURE — 84100 ASSAY OF PHOSPHORUS: CPT | Performed by: PHYSICIAN ASSISTANT

## 2024-10-26 PROCEDURE — 83735 ASSAY OF MAGNESIUM: CPT | Performed by: PHYSICIAN ASSISTANT

## 2024-10-26 PROCEDURE — 97535 SELF CARE MNGMENT TRAINING: CPT | Mod: GO

## 2024-10-26 PROCEDURE — 97110 THERAPEUTIC EXERCISES: CPT | Mod: GO

## 2024-10-26 PROCEDURE — 99233 SBSQ HOSP IP/OBS HIGH 50: CPT | Performed by: STUDENT IN AN ORGANIZED HEALTH CARE EDUCATION/TRAINING PROGRAM

## 2024-10-26 PROCEDURE — 93005 ELECTROCARDIOGRAM TRACING: CPT | Performed by: THORACIC SURGERY (CARDIOTHORACIC VASCULAR SURGERY)

## 2024-10-26 PROCEDURE — 97166 OT EVAL MOD COMPLEX 45 MIN: CPT | Mod: GO

## 2024-10-26 PROCEDURE — 210N000001 HC R&B IMCU HEART CARE

## 2024-10-26 PROCEDURE — 71045 X-RAY EXAM CHEST 1 VIEW: CPT

## 2024-10-26 PROCEDURE — 85014 HEMATOCRIT: CPT | Performed by: PHYSICIAN ASSISTANT

## 2024-10-26 PROCEDURE — 84132 ASSAY OF SERUM POTASSIUM: CPT | Performed by: THORACIC SURGERY (CARDIOTHORACIC VASCULAR SURGERY)

## 2024-10-26 RX ORDER — AMLODIPINE BESYLATE 2.5 MG/1
2.5 TABLET ORAL DAILY
Status: DISCONTINUED | OUTPATIENT
Start: 2024-10-26 | End: 2024-10-31 | Stop reason: HOSPADM

## 2024-10-26 RX ORDER — HYDROXYZINE HYDROCHLORIDE 50 MG/1
50 TABLET, FILM COATED ORAL EVERY 6 HOURS PRN
Status: DISCONTINUED | OUTPATIENT
Start: 2024-10-26 | End: 2024-10-31 | Stop reason: HOSPADM

## 2024-10-26 RX ORDER — ALPRAZOLAM 0.5 MG
0.5 TABLET ORAL 3 TIMES DAILY PRN
Status: DISCONTINUED | OUTPATIENT
Start: 2024-10-26 | End: 2024-10-27

## 2024-10-26 RX ORDER — ALPRAZOLAM 0.5 MG
0.5 TABLET ORAL EVERY 4 HOURS
Status: COMPLETED | OUTPATIENT
Start: 2024-10-26 | End: 2024-10-26

## 2024-10-26 RX ORDER — DEXTROSE MONOHYDRATE 25 G/50ML
25-50 INJECTION, SOLUTION INTRAVENOUS
Status: DISCONTINUED | OUTPATIENT
Start: 2024-10-26 | End: 2024-10-26

## 2024-10-26 RX ORDER — ALBUTEROL SULFATE 90 UG/1
2 INHALANT RESPIRATORY (INHALATION) EVERY 6 HOURS PRN
Status: DISCONTINUED | OUTPATIENT
Start: 2024-10-26 | End: 2024-10-31 | Stop reason: HOSPADM

## 2024-10-26 RX ORDER — HYDROXYZINE HYDROCHLORIDE 25 MG/1
25 TABLET, FILM COATED ORAL EVERY 6 HOURS PRN
Status: DISCONTINUED | OUTPATIENT
Start: 2024-10-26 | End: 2024-10-31 | Stop reason: HOSPADM

## 2024-10-26 RX ORDER — FUROSEMIDE 10 MG/ML
40 INJECTION INTRAMUSCULAR; INTRAVENOUS 3 TIMES DAILY
Status: DISCONTINUED | OUTPATIENT
Start: 2024-10-26 | End: 2024-10-29

## 2024-10-26 RX ORDER — NICOTINE POLACRILEX 4 MG
15-30 LOZENGE BUCCAL
Status: DISCONTINUED | OUTPATIENT
Start: 2024-10-26 | End: 2024-10-26

## 2024-10-26 RX ORDER — KETOROLAC TROMETHAMINE 30 MG/ML
30 INJECTION, SOLUTION INTRAMUSCULAR; INTRAVENOUS EVERY 6 HOURS PRN
Status: ACTIVE | OUTPATIENT
Start: 2024-10-26 | End: 2024-10-31

## 2024-10-26 RX ADMIN — ONDANSETRON 4 MG: 4 TABLET, ORALLY DISINTEGRATING ORAL at 17:45

## 2024-10-26 RX ADMIN — CYANOCOBALAMIN TAB 1000 MCG 1000 MCG: 1000 TAB at 07:55

## 2024-10-26 RX ADMIN — AMLODIPINE BESYLATE 2.5 MG: 2.5 TABLET ORAL at 08:00

## 2024-10-26 RX ADMIN — METOPROLOL TARTRATE 12.5 MG: 25 TABLET, FILM COATED ORAL at 08:05

## 2024-10-26 RX ADMIN — SENNOSIDES AND DOCUSATE SODIUM 1 TABLET: 8.6; 5 TABLET ORAL at 21:11

## 2024-10-26 RX ADMIN — HEPARIN SODIUM 5000 UNITS: 10000 INJECTION, SOLUTION INTRAVENOUS; SUBCUTANEOUS at 21:15

## 2024-10-26 RX ADMIN — HEPARIN SODIUM 5000 UNITS: 10000 INJECTION, SOLUTION INTRAVENOUS; SUBCUTANEOUS at 11:54

## 2024-10-26 RX ADMIN — INSULIN ASPART 1 UNITS: 100 INJECTION, SOLUTION INTRAVENOUS; SUBCUTANEOUS at 12:03

## 2024-10-26 RX ADMIN — ACETAMINOPHEN 975 MG: 325 TABLET ORAL at 11:54

## 2024-10-26 RX ADMIN — ALPRAZOLAM 0.5 MG: 0.5 TABLET ORAL at 01:21

## 2024-10-26 RX ADMIN — METOPROLOL TARTRATE 12.5 MG: 25 TABLET, FILM COATED ORAL at 11:53

## 2024-10-26 RX ADMIN — METOPROLOL TARTRATE 12.5 MG: 25 TABLET, FILM COATED ORAL at 17:46

## 2024-10-26 RX ADMIN — FUROSEMIDE 40 MG: 10 INJECTION, SOLUTION INTRAVENOUS at 11:51

## 2024-10-26 RX ADMIN — ACETAMINOPHEN 975 MG: 325 TABLET ORAL at 21:10

## 2024-10-26 RX ADMIN — ONDANSETRON 4 MG: 2 INJECTION INTRAMUSCULAR; INTRAVENOUS at 09:01

## 2024-10-26 RX ADMIN — ASPIRIN 81 MG CHEWABLE TABLET 162 MG: 81 TABLET CHEWABLE at 07:56

## 2024-10-26 RX ADMIN — NICARDIPINE HYDROCHLORIDE 5 MG/HR: 0.2 INJECTION, SOLUTION INTRAVENOUS at 02:00

## 2024-10-26 RX ADMIN — POLYETHYLENE GLYCOL 3350 17 G: 17 POWDER, FOR SOLUTION ORAL at 07:55

## 2024-10-26 RX ADMIN — METOPROLOL TARTRATE 12.5 MG: 25 TABLET, FILM COATED ORAL at 21:15

## 2024-10-26 RX ADMIN — CEFAZOLIN 1 G: 1 INJECTION, POWDER, FOR SOLUTION INTRAMUSCULAR; INTRAVENOUS at 11:57

## 2024-10-26 RX ADMIN — ROSUVASTATIN CALCIUM 40 MG: 40 TABLET, FILM COATED ORAL at 21:10

## 2024-10-26 RX ADMIN — CEFAZOLIN 1 G: 1 INJECTION, POWDER, FOR SOLUTION INTRAMUSCULAR; INTRAVENOUS at 02:31

## 2024-10-26 RX ADMIN — LIDOCAINE 1 PATCH: 4 PATCH TOPICAL at 17:47

## 2024-10-26 RX ADMIN — OXYCODONE HYDROCHLORIDE 5 MG: 5 TABLET ORAL at 04:00

## 2024-10-26 RX ADMIN — ACETAMINOPHEN 975 MG: 325 TABLET ORAL at 03:59

## 2024-10-26 RX ADMIN — PANTOPRAZOLE SODIUM 40 MG: 40 TABLET, DELAYED RELEASE ORAL at 07:56

## 2024-10-26 RX ADMIN — OXYCODONE HYDROCHLORIDE 5 MG: 5 TABLET ORAL at 17:46

## 2024-10-26 RX ADMIN — FUROSEMIDE 40 MG: 10 INJECTION, SOLUTION INTRAVENOUS at 07:59

## 2024-10-26 RX ADMIN — INSULIN ASPART 1 UNITS: 100 INJECTION, SOLUTION INTRAVENOUS; SUBCUTANEOUS at 08:19

## 2024-10-26 RX ADMIN — SENNOSIDES AND DOCUSATE SODIUM 1 TABLET: 8.6; 5 TABLET ORAL at 07:56

## 2024-10-26 RX ADMIN — FUROSEMIDE 40 MG: 10 INJECTION, SOLUTION INTRAVENOUS at 17:47

## 2024-10-26 ASSESSMENT — ACTIVITIES OF DAILY LIVING (ADL)
ADLS_ACUITY_SCORE: 0
ADLS_ACUITY_SCORE: 18.25
ADLS_ACUITY_SCORE: 0
ADLS_ACUITY_SCORE: 18.25
ADLS_ACUITY_SCORE: 17.75
ADLS_ACUITY_SCORE: 0
ADLS_ACUITY_SCORE: 18.25
ADLS_ACUITY_SCORE: 0
ADLS_ACUITY_SCORE: 17.75
ADLS_ACUITY_SCORE: 0
ADLS_ACUITY_SCORE: 17.75
ADLS_ACUITY_SCORE: 0
ADLS_ACUITY_SCORE: 18.25
DEPENDENT_IADLS:: INDEPENDENT
PREVIOUS_RESPONSIBILITIES: MEAL PREP;HOUSEKEEPING;LAUNDRY;SHOPPING;MEDICATION MANAGEMENT;FINANCES;DRIVING
ADLS_ACUITY_SCORE: 0
ADLS_ACUITY_SCORE: 17.75
ADLS_ACUITY_SCORE: 18.25

## 2024-10-26 NOTE — PLAN OF CARE
Goal Outcome Evaluation:      Plan of Care Reviewed With: patient          Outcome Evaluation: Goal is home with Outpatient cardiac rehab. No further care management intervention anticipated at this time.  Please re-consult if further needs arise.  Care management signing off.

## 2024-10-26 NOTE — PROGRESS NOTES
"RESPIRATORY CARE NOTE     Patient extubated at 1850, placed on 4 Lpm nasal cannula, BS diminished/clear, patient cough is strong, non-productive, patient is able to verbalize upon extubation, No complications at this time.  Will continue to monitor and follow with RCAT, IS, flutter-valve.    Blood pressure (!) 141/78, pulse 84, temperature 98.5  F (36.9  C), temperature source Oral, resp. rate 16, height 1.676 m (5' 6\"), weight 70.6 kg (155 lb 9.6 oz), SpO2 100%.    Neeraj Quezada, RT  "

## 2024-10-26 NOTE — TREATMENT PLAN
RCAT Treatment Plan    Patient Score: 7  Patient Acuity: 4    Clinical Indication for Therapy: atelectasis    Therapy Ordered: flutter-valve QID, IS    Assessment Summary: Pt is S/P CABG.  SpO2 was 98% on 4L nasal cannula; reduced to 2 Lpm.  Breath sounds were diminished/clear.  Pt instructed on use of flutter-valve; demonstrated good technique.  Pt achieved 1100 mL on IS.  RT will continue to follow and assess daily.      Neeraj Quezada, RT  10/25/2024

## 2024-10-26 NOTE — CONSULTS
Care Management Initial Consult    General Information  Assessment completed with: PatientHolly  Type of CM/SW Visit: Initial Assessment    Primary Care Provider verified and updated as needed: Yes   Readmission within the last 30 days: no previous admission in last 30 days      Reason for Consult: discharge planning  Advance Care Planning:          Communication Assessment  Patient's communication style: spoken language (English or Bilingual)    Hearing Difficulty or Deaf: no   Wear Glasses or Blind: yes    Cognitive  Cognitive/Neuro/Behavioral: WDL  Level of Consciousness: alert  Arousal Level: opens eyes spontaneously  Orientation: oriented x 4  Mood/Behavior: anxious  Best Language: 0 - No aphasia  Speech: logical, clear, spontaneous    Living Environment:   People in home: spouse  Leif  Current living Arrangements: house      Able to return to prior arrangements: yes     Family/Social Support:  Care provided by: self  Provides care for: no one  Marital Status:   Support system:   Leif       Description of Support System: Supportive       Current Resources:   Patient receiving home care services: No     Community Resources: None  Equipment currently used at home: none  Supplies currently used at home: None    Employment/Financial:  Employment Status: retired        Financial Concerns: none   Referral to Financial Worker: No     Does the patient's insurance plan have a 3 day qualifying hospital stay waiver?  Yes     Which insurance plan 3 day waiver is available? Alternative insurance waiver    Will the waiver be used for post-acute placement?   No    Lifestyle & Psychosocial Needs:  Social Drivers of Health     Food Insecurity: Low Risk  (10/23/2024)    Food Insecurity     Within the past 12 months, did you worry that your food would run out before you got money to buy more?: No     Within the past 12 months, did the food you bought just not last and you didn t have money to get more?: No    Depression: Not at risk (4/8/2024)    Received from [facility]    PHQ-2     Patient Health Questionnaire-2 Score: 0   Housing Stability: Low Risk  (10/23/2024)    Housing Stability     Do you have housing? : Yes     Are you worried about losing your housing?: No   Tobacco Use: Unknown (10/23/2024)    Patient History     Smoking Tobacco Use: Never     Smokeless Tobacco Use: Unknown     Passive Exposure: Not on file   Financial Resource Strain: Low Risk  (10/23/2024)    Financial Resource Strain     Within the past 12 months, have you or your family members you live with been unable to get utilities (heat, electricity) when it was really needed?: No   Alcohol Use: Not on file   Transportation Needs: Low Risk  (10/23/2024)    Transportation Needs     Within the past 12 months, has lack of transportation kept you from medical appointments, getting your medicines, non-medical meetings or appointments, work, or from getting things that you need?: No   Physical Activity: Not on file   Interpersonal Safety: High Risk (10/23/2024)    Interpersonal Safety     Do you feel physically and emotionally safe where you currently live?: No     Within the past 12 months, have you been hit, slapped, kicked or otherwise physically hurt by someone?: No     Within the past 12 months, have you been humiliated or emotionally abused in other ways by your partner or ex-partner?: No   Stress: Not on file   Social Connections: Not on file   Health Literacy: Not on file     Functional Status:  Prior to admission patient needed assistance:   Dependent ADLs:: Independent  Dependent IADLs:: Independent     Mental Health Status:  Mental Health Status: No Current Concerns       Chemical Dependency Status:  Chemical Dependency Status: No Current Concerns           Values/Beliefs:  Spiritual, Cultural Beliefs, Sikh Practices, Values that affect care: no             Discussed  Partnership in Safe Discharge Planning  document with patient/family:  No    Additional Information:  Writer met with patient to review role of care management services, discuss goals of care and assess need for any possible services at discharge. Patient alert, answering questions appropriately and engaged in the conversation.  Address, phone number and PCP confirmed.  Patient is  and independent with all activities of daily living at baseline. She does not use any DME for mobility assist.     Next Steps: Goal is home with Outpatient cardiac rehab. No further care management intervention anticipated at this time.  Please re-consult if further needs arise.  Care management signing off.      Alycia Vela RN

## 2024-10-26 NOTE — PROGRESS NOTES
10/26/24 0811   Appointment Info   Signing Clinician's Name / Credentials (OT) Ju Mckoy OT   Living Environment   People in Home spouse   Current Living Arrangements house   Home Accessibility stairs to enter home;stairs within home   Number of Stairs, Main Entrance 3   Stair Railings, Main Entrance railings safe and in good condition   Number of Stairs, Within Home, Primary greater than 10 stairs   Stair Railings, Within Home, Primary railings safe and in good condition   Transportation Anticipated family or friend will provide   Living Environment Comments pt was independent w/ADLs and mobility   Self-Care   Usual Activity Tolerance good   Current Activity Tolerance fair   Equipment Currently Used at Home none   Fall history within last six months no   Instrumental Activities of Daily Living (IADL)   Previous Responsibilities meal prep;housekeeping;laundry;shopping;medication management;finances;driving  ( assists PRN)   General Information   Onset of Illness/Injury or Date of Surgery 10/23/24   Referring Physician Dr Cheng   Patient/Family Therapy Goal Statement (OT) go home   Additional Occupational Profile Info/Pertinent History of Current Problem 69 year old old female with HTN, HL, DM2, accelerating angina who had an abnormal stress test leading to a coronary angiogram today.  Coronary angiogram demonstrated multi-vessel CAD.  CV surgery consulted.   Existing Precautions/Restrictions cardiac   Left Upper Extremity (Weight-bearing Status) non weight-bearing (NWB)   Right Upper Extremity (Weight-bearing Status) non weight-bearing (NWB)   Left Lower Extremity (Weight-bearing Status) full weight-bearing (FWB)   Right Lower Extremity (Weight-bearing Status) full weight-bearing (FWB)   Cognitive Status Examination   Orientation Status orientation to person, place and time   Follows Commands WNL   Visual Perception   Visual Impairment/Limitations corrective lenses for reading   Sensory   Sensory  Quick Adds sensation intact   Pain Assessment   Patient Currently in Pain Yes, see Vital Sign flowsheet  (incision)   Range of Motion Comprehensive   General Range of Motion no range of motion deficits identified   Strength Comprehensive (MMT)   General Manual Muscle Testing (MMT) Assessment no strength deficits identified   Muscle Tone Assessment   Muscle Tone Quick Adds No deficits were identified   Coordination   Upper Extremity Coordination No deficits were identified   Bed Mobility   Bed Mobility sit-supine   Sit-Supine Harvey (Bed Mobility) minimum assist (75% patient effort)   Transfers   Transfers bed-chair transfer   Transfer Skill: Bed to Chair/Chair to Bed   Bed-Chair Harvey (Transfers) contact guard   Balance   Balance Assessment standing dynamic balance   Standing Balance: Dynamic contact guard   Activities of Daily Living   BADL Assessment/Intervention no deficits identified   Clinical Impression   Criteria for Skilled Therapeutic Interventions Met (OT) Yes, treatment indicated   OT Diagnosis CABG   Influenced by the following impairments fatigue, decreased ADLs/balance   OT Problem List-Impairments impacting ADL activity tolerance impaired;balance   Assessment of Occupational Performance 3-5 Performance Deficits   Identified Performance Deficits fatigue, decreased ADLs/balance   Planned Therapy Interventions (OT) ADL retraining   Clinical Decision Making Complexity (OT) detailed assessment/moderate complexity   Risk & Benefits of therapy have been explained evaluation/treatment results reviewed;care plan/treatment goals reviewed;risks/benefits reviewed;participants voiced agreement with care plan   OT Total Evaluation Time   OT Eval, Moderate Complexity Minutes (45115) 10   OT Goals   Therapy Frequency (OT) 2 times/day   OT Predicted Duration/Target Date for Goal Attainment 11/04/24   OT Goals Cardiac Phase 1   OT: Understanding of cardiac education to maximize quality of life, condition  management, and health outcomes Patient;Caregiver;Verbalize;Demonstrate   OT: Perform aerobic activity with stable cardiovascular response 10 minutes;continuous   OT: Functional/aerobic ambulation tolerance with stable cardiovascular response in order to return to home and community environment Greater than 300 feet;Modified independent   OT: Navigation of stairs simulating home set up with stable cardiovascular response in order to return to home and community environment Greater than 10 stairs;Modified independent   Interventions   Interventions Quick Adds Self-Care/Home Management;Therapeutic Procedures/Exercise   Self-Care/Home Management   Self-Care/Home Mgmt/ADL, Compensatory, Meal Prep Minutes (22510) 12   Symptoms Noted During/After Treatment (Meal Preparation/Planning Training) fatigue;increased pain   Treatment Detail/Skilled Intervention reviewed sternal prrecautions. stopligh ool w/pt and , transfers CGA w/out a device, bed mobility Min A  for logroll technigue   Therapeutic Procedures/Exercise   Therapeutic Procedure: strength, endurance, ROM, flexibillity minutes (66497) 8   Symptoms Noted During/After Treatment fatigue   Treatment Detail/Skilled Intervention see GEOFF turner   Treatment Time Includes (CR Only) See specific exercise details intervention group(s)   Calisthenics   Type Hip Abductors;Hip Flexor: kicks;March in place;Knee Flexion;Knee Bends  (ankle pumps)   Symptoms Fatigue   Cardiovascular Response Normal   Exercise Details GEOFF turner 10 reps x6 ex w/visual cues   Vital Signs Details before /60, HR 85, O2 95 after BP   Cardiac Education   Education Provided Precautions;Stop light tool   Education Packet Given to Patient Yes   All Patient Education Handouts Reviewed with Patient and/or Family Yes   Cardiac Rehab Phase II Plan   Phase II Order Received Yes   Phase II Appointment Status Scheduled   Date/Time 11/4, 7:45   OT Discharge Planning   OT Plan amb when able, GEOFF turner,  precautions in room   OT Discharge Recommendation (DC Rec) home with outpatient cardiac rehab   OT Rationale for DC Rec pt is safe to return home w/family support and OP Cardiac Rehab   OT Brief overview of current status Min/CGA w/mobility   Total Session Time   Timed Code Treatment Minutes 20   Total Session Time (sum of timed and untimed services) 30

## 2024-10-26 NOTE — PROGRESS NOTES
[facility]    Medicine Progress Note - Hospitalist Service    Date of Admission:  10/23/2024    Assessment & Plan   69 year old old female with HTN, HL, DM2, accelerating angina who had an abnormal stress test leading to a coronary angiogram on 10/23 which showed multi-vessel CAD.   -- S/p CABGx5 on 10/25. Extubated same day.  -- Dispo per CV Surgery.    Multi-vessel CAD  -- Coronary angiogram completed on 10/23 which showed:   LM: no obstruction  LAD:proximal 95% narrowing  Lcx:OM1 70%, OM3 70% narrowing  RCA:dominant, 30% proximal, 70% rPDA narrowing  -- , Home Crestor increased to 40mg every day  -- S/p CABG x5- LIMA to LAD, Radial to OM1, rSVG to Diag, OM 3 and PDA on 10/25  -- Postop cares per CV Surgery  -- Extubated on 10/25.     Post op anemia  -- Monitor CBC    Leukocytosis  -- Likely reactive. Monitor cbc and temp curve    Essential HTN  -- Metoprolol, Norvasc  -- Monitor vital signs per protocol  -- Hydralazin iv prn per CV surgery    DM 2  -- Continue to hold Metformin  -- Accu-checks, medium sliding scale, hypoglycemia protocol  -- A1c: 7.1%    HLD  -- Continue Increased Crestor at 40mg every day with goal of LDL<70    Mild intermittent asthma w/o acute exacerbation  -- Albuterol inhaler prn    GERD  -- PTA Omeprazole    Anxiety d/o  -- PTA Alprazolam. Prn Atarax          Diet: Advance Diet as Tolerated: Clear Liquid Diet    DVT Prophylaxis: Heparin gtt  Bojorquez Catheter: PRESENT, indication: ?  (Error. Value could not be saved.), ICU only: hourly urine output needed for patient care  Lines: None     Cardiac Monitoring: ACTIVE order. Indication: Open heart surgery (72 hours)  Code Status: Full Code      Clinically Significant Risk Factors        # Hyperkalemia: Highest K = 5.7 mmol/L in last 2 days, will monitor as appropriate  # Hypernatremia: Highest Na = 149 mmol/L in last 2 days, will monitor as appropriate  # Hyperchloremia: Highest Cl = 113 mmol/L in last 2 days, will  "monitor as appropriate      # Hypocalcemia: Lowest Ca = 7.6 mg/dL in last 2 days, will monitor and replace as appropriate  # Hypercalcemia: Highest Ca = 10.6 mg/dL in last 2 days, will monitor as appropriate     # Coagulation Defect: INR = 1.31 (Ref range: 0.85 - 1.15) and/or PTT = 66 Seconds (Ref range: 22 - 38 Seconds), will monitor for bleeding             # DMII: A1C = 7.1 % (Ref range: <5.7 %) within past 6 months, PRESENT ON ADMISSION  # Overweight: Estimated body mass index is 26.89 kg/m  as calculated from the following:    Height as of this encounter: 1.676 m (5' 6\").    Weight as of this encounter: 75.6 kg (166 lb 9.6 oz)., PRESENT ON ADMISSION     # Financial/Environmental Concerns: none  # Asthma: noted on problem list        Disposition Plan     Medically Ready for Discharge: Anticipated in 2-4 Days             Ayleen Copeland MD  Hospitalist Service  [facility]  Securely message with [employer] (more info)  Text page via [employer] Paging/Directory   ______________________________________________________________________    Interval History   Patient is seen and examined at bedside.   Pt feels tired. Decreased appetite. No other complaints.  Plan of care discussed with patient. All questions answered. Pt verbalized understanding.   Physical Exam   Vital Signs: Temp: 98  F (36.7  C) Temp src: Oral BP: 104/55 Pulse: 88   Resp: 22 SpO2: 92 % O2 Device: None (Room air) Oxygen Delivery: 1 LPM  Weight: 166 lbs 9.6 oz    GEN: Alert. off of sedation. Intubated  HEENT: Atraumatic, mucous membrane- moist and pink.  Chest: Coarse b/l air entry. CT in place.  CVS: S1S2 regular.   Abdomen: Soft. Non-tender, non-distended. No organomegaly. No guarding or rigidity. Bowel sounds active.   Extremities: Mild pedal edema.  CNS: No involuntary movements.  Skin: no cyanosis or clubbing.     Medical Decision Making       52 MINUTES SPENT BY ME on the date of service doing chart review, history, exam, " documentation & further activities per the note.      Data

## 2024-10-26 NOTE — PLAN OF CARE
[facility] - ICU    RN Progress Note:            Pertinent Assessments:      Please refer to flowsheet rows for full assessment     Pt up to side of bed with assist of 3.  Tolerated well.  C/o pain in back.  Prn meds given. Vasopressers titrated off to keep Map greater than 65.   Pt anxious. Increased blood pressure.  Dr. Tobar aware.  Restarted pts alprazolam. Titrated nicardipine gtt to keep SBP less than 140.  Up to chair this morning with assist of two.  Tolerated well.  Adequate chest tube and rosas output.           Key Events - This Shift:       Vasopressers titrated off.  Nicardipine titrated up to keep SBP less than 140.                  Barriers to Discharge / Downgrade:     Insulin gtt, chest tubes, vasoactive gtts.  Post cardiac surgery           Problem: Adult Inpatient Plan of Care  Goal: Optimal Comfort and Wellbeing  Outcome: Progressing  Intervention: Provide Person-Centered Care  Recent Flowsheet Documentation  Taken 10/25/2024 2000 by Christel Balderrama, RN  Trust Relationship/Rapport:   care explained   questions answered   emotional support provided     Problem: Cardiovascular Surgery  Goal: Absence of Bleeding  Outcome: Progressing  Intervention: Monitor and Manage Bleeding  Recent Flowsheet Documentation  Taken 10/25/2024 2000 by Christel Balderrama, RN  Bleeding Management: dressing monitored     Problem: Cardiovascular Surgery  Goal: Effective Cardiac Function  Outcome: Progressing  Intervention: Optimize Cardiac Output and Blood Flow  Recent Flowsheet Documentation  Taken 10/25/2024 2000 by Christel Balderrama, RN  Dysrhythmia Management: pacing wires maintained   Goal Outcome Evaluation:

## 2024-10-26 NOTE — TREATMENT PLAN
RCAT Treatment Plan    Patient Score: 4  Patient Acuity: 5    Clinical Indication for Therapy: prevent atelectasis    Therapy Ordered: Flutter valve prn / Albuterol MDI prn    Assessment Summary: Pt is post CABGx5, now on RA, lung sounds clear bilaterally, pulmonary history of asthma, takes albuterol MDI as needed for home routine. IS = 1100. Patient is appropriate for PRN flutter,  will also order albuterol MDI per home routine.     Sierra Dawn, RT  10/26/2024

## 2024-10-26 NOTE — PROGRESS NOTES
"CVTS Daily Progress Note   POD#1 s/p CABGx5  Attending: Gina  LOS: 3    SUBJECTIVE/INTERVAL EVENTS:    Patient arrived to ICU from OR yesterday afternoon. She was subsequently extubated and weaned from pressors (on nicardipine for radial artery graft protection). Normotensive. NSR. Patient progressing well. Maintaining oxygen saturations on nasal cannula. Up to chair this AM. Pain well controlled. - BM / flatus. Tolerating diet with minimal nausea. UOP adequate. Chest tube output appropriate. Hgb 8.9. Patient denies new chest pain, shortness of breath, abdominal pain, calf pain, nausea. Patient has no questions today.     OBJECTIVE:  Temp:  [96.8  F (36  C)-98.9  F (37.2  C)] 97.9  F (36.6  C)  Pulse:  [77-95] 82  Resp:  [16-36] 16  BP: (123-141)/(59-86) 123/60  MAP:  [58 mmHg-112 mmHg] 78 mmHg  Arterial Line BP: ()/(24-86) 124/52  FiO2 (%):  [4 %-60 %] 4 %  SpO2:  [87 %-100 %] 97 %  Vitals:    10/23/24 1233 10/24/24 0248 10/25/24 0403 10/26/24 0630   Weight: 72.1 kg (159 lb) 70.8 kg (156 lb 1.6 oz) 70.6 kg (155 lb 9.6 oz) 75.6 kg (166 lb 9.6 oz)       Clinically Significant Risk Factors        # Hyperkalemia: Highest K = 5.7 mmol/L in last 2 days, will monitor as appropriate  # Hypernatremia: Highest Na = 149 mmol/L in last 2 days, will monitor as appropriate  # Hyperchloremia: Highest Cl = 113 mmol/L in last 2 days, will monitor as appropriate      # Hypocalcemia: Lowest Ca = 7.6 mg/dL in last 2 days, will monitor and replace as appropriate  # Hypercalcemia: Highest Ca = 10.6 mg/dL in last 2 days, will monitor as appropriate     # Coagulation Defect: INR = 1.31 (Ref range: 0.85 - 1.15) and/or PTT = 66 Seconds (Ref range: 22 - 38 Seconds), will monitor for bleeding             # DMII: A1C = 7.1 % (Ref range: <5.7 %) within past 6 months, PRESENT ON ADMISSION  # Overweight: Estimated body mass index is 26.89 kg/m  as calculated from the following:    Height as of this encounter: 1.676 m (5' 6\").    Weight " as of this encounter: 75.6 kg (166 lb 9.6 oz)., PRESENT ON ADMISSION     # Asthma: noted on problem list                 Current Medications:    Scheduled Meds:  Current Facility-Administered Medications   Medication Dose Route Frequency Provider Last Rate Last Admin    acetaminophen (TYLENOL) Suppository 650 mg  650 mg Rectal Q8H Vanesa Oreilly PA-C   650 mg at 10/25/24 1621    acetaminophen (TYLENOL) tablet 975 mg  975 mg Oral Q8H Vanesa Oreilly PA-C   975 mg at 10/26/24 0359    amLODIPine (NORVASC) tablet 2.5 mg  2.5 mg Oral Daily Vanesa Oreilly PA-C   2.5 mg at 10/26/24 0800    aspirin (ASA) chewable tablet 162 mg  162 mg Oral or NG Tube Daily Vanesa Oreilly PA-C   162 mg at 10/26/24 0756    Or    aspirin (ASA) Suppository 300 mg  300 mg Rectal Daily Vanesa Oreilly PA-C        ceFAZolin (ANCEF) 1 g vial to attach to  ml bag for ADULT or 50 ml bag for PEDS  1 g Intravenous Q8H Vanesa Oreilly PA-C   1 g at 10/26/24 0231    cyanocobalamin (VITAMIN B-12) tablet 1,000 mcg  1,000 mcg Oral Daily Vanesa Oreilly PA-C   1,000 mcg at 10/26/24 0755    furosemide (LASIX) injection 40 mg  40 mg Intravenous TID Vanesa Oreilly PA-C   40 mg at 10/26/24 0759    heparin ANTICOAGULANT injection 5,000 Units  5,000 Units Subcutaneous Q8H Vanesa Oreilly PA-C        insulin aspart (NovoLOG) injection (RAPID ACTING)  1-7 Units Subcutaneous TID AC Vanesa Oreilly PA-C   1 Units at 10/26/24 0819    insulin aspart (NovoLOG) injection (RAPID ACTING)  1-5 Units Subcutaneous At Bedtime Vanesa Oreilly PA-C        Lidocaine (LIDOCARE) 4 % Patch 1-2 patch  1-2 patch Transdermal Q24H Vanesa Oreilly PA-C   1 patch at 10/25/24 1701    [Held by provider] metFORMIN (GLUCOPHAGE XR) 24 hr tablet 500 mg  500 mg Oral Daily with Vanesa Gant PA-C        metoprolol tartrate (LOPRESSOR) half-tab 12.5 mg  12.5 mg Oral BID Afia  Vanesa Harris PA-C   12.5 mg at 10/26/24 0805    metoprolol tartrate (LOPRESSOR) half-tab 12.5 mg  12.5 mg Oral TID Vanesa Oreilly PA-C        pantoprazole (PROTONIX) 2 mg/mL suspension 40 mg  40 mg Oral or NG Tube Daily Vanesa Oreilly PA-C        Or    pantoprazole (PROTONIX) EC tablet 40 mg  40 mg Oral Daily Vanesa Oreilly PA-C   40 mg at 10/26/24 0756    polyethylene glycol (MIRALAX) Packet 17 g  17 g Oral Daily Vanesa Oreilly PA-C   17 g at 10/26/24 0755    rosuvastatin (CRESTOR) tablet 40 mg  40 mg Oral Daily Vanesa Oreilly PA-C   40 mg at 10/24/24 2023    senna-docusate (SENOKOT-S/PERICOLACE) 8.6-50 MG per tablet 1 tablet  1 tablet Oral BID Vanesa Oreilly PA-C   1 tablet at 10/26/24 0756     Continuous Infusions:  Current Facility-Administered Medications   Medication Dose Route Frequency Provider Last Rate Last Admin    niCARdipine 40 mg in 200 mL NS (CARDENE) infusion  0.5-15 mg/hr Intravenous Continuous PRN Vanesa Oreilly PA-C 12.5 mL/hr at 10/26/24 0857 2.5 mg/hr at 10/26/24 0857     PRN Meds:.  Current Facility-Administered Medications   Medication Dose Route Frequency Provider Last Rate Last Admin    [START ON 10/28/2024] acetaminophen (TYLENOL) tablet 650 mg  650 mg Oral Q4H PRN Vanesa Oreilly PA-C        albumin human 5 % injection 12.5 g  12.5 g Intravenous Q30 Min PRN Rachel Fuentes MD   12.5 g at 10/25/24 2118    bisacodyl (DULCOLAX) suppository 10 mg  10 mg Rectal Daily PRN Vanesa Oreilly PA-C        calcium gluconate 1 g in 50 mL in sodium chloride intermittent infusion  1 g Intravenous Once PRN Vanesa Oreilly PA-C   1 g at 10/25/24 1353    calcium gluconate 2 g in  mL intermittent infusion  2 g Intravenous Once PRN Vanesa Oreilly PA-C        calcium gluconate 3 g in sodium chloride 0.9 % 100 mL intermittent infusion  3 g Intravenous Once PRN Vanesa Oreilly PA-C        glucose  gel 15-30 g  15-30 g Oral Q15 Min PRN Vanesa Oreilly PA-C        Or    dextrose 50 % injection 25-50 mL  25-50 mL Intravenous Q15 Min PRN Vanesa Oreilly PA-C        Or    glucagon injection 1 mg  1 mg Subcutaneous Q15 Min PRN Vanesa Oreilly PA-C        hydrALAZINE (APRESOLINE) injection 10 mg  10 mg Intravenous Q30 Min PRN Vanesa Oreilly PA-C        HYDROmorphone (DILAUDID) injection 0.2 mg  0.2 mg Intravenous Q2H PRN Vanesa Oreilly PA-C   0.2 mg at 10/25/24 2124    Or    HYDROmorphone (PF) (DILAUDID) injection 0.4 mg  0.4 mg Intravenous Q2H PRN Vanesa Oreilly PA-C        ketorolac (TORADOL) injection 30 mg  30 mg Intravenous Q6H PRN Vanesa Oreilly PA-C        lactated ringers BOLUS 250 mL  250 mL Intravenous Q15 Min PRN Vanesa Oreilly PA-C        magnesium hydroxide (MILK OF MAGNESIA) suspension 30 mL  30 mL Oral Daily PRN Vanesa Oreilly PA-C        naloxone (NARCAN) injection 0.2 mg  0.2 mg Intravenous Q2 Min PRN Russell Cheng MD        Or    naloxone (NARCAN) injection 0.4 mg  0.4 mg Intravenous Q2 Min PRN Russell Cheng MD        Or    naloxone (NARCAN) injection 0.2 mg  0.2 mg Intramuscular Q2 Min PRN Russell Cheng MD        Or    naloxone (NARCAN) injection 0.4 mg  0.4 mg Intramuscular Q2 Min PRN Russell Cheng MD        niCARdipine 40 mg in 200 mL NS (CARDENE) infusion  0.5-15 mg/hr Intravenous Continuous PRN Vanesa Oreilly PA-C 12.5 mL/hr at 10/26/24 0857 2.5 mg/hr at 10/26/24 0857    ondansetron (ZOFRAN ODT) ODT tab 4 mg  4 mg Oral Q6H PRN Vanesa Oreilly PA-C        Or    ondansetron (ZOFRAN) injection 4 mg  4 mg Intravenous Q6H PRN Vanesa Oreilly PA-C   4 mg at 10/26/24 0901    oxyCODONE (ROXICODONE) tablet 5 mg  5 mg Oral Q4H PRN Vanesa Oreilly PA-C   5 mg at 10/26/24 0400    Or    oxyCODONE (ROXICODONE) tablet 10 mg  10 mg Oral Q4H PRN Vanesa Oreilly PA-C        prochlorperazine  (COMPAZINE) injection 5 mg  5 mg Intravenous Q6H PRN Vanesa Oreilly PA-C   5 mg at 10/25/24 2100    Or    prochlorperazine (COMPAZINE) tablet 5 mg  5 mg Oral Q6H PRN Vanesa Oreilly PA-C           Cardiographics:    Telemetry monitoring demonstrates NSR with rates in the 80s per my personal review.    Imaging:  Results for orders placed or performed during the hospital encounter of 10/23/24   US Carotid Bilateral    Impression    IMPRESSION:  1.  Mild plaque formation, velocities consistent with less than 50% stenosis in the right internal carotid artery.  2.  Mild plaque formation, velocities consistent with less than 50% stenosis in the left internal carotid artery.  3.  Flow within the vertebral arteries is antegrade.   XR Chest Port 1 View    Impression    IMPRESSION: Endotracheal tube tip terminates in the mid thoracic trachea approximately 4 cm from the venice. Right IJ introducer tip terminates in the lower SVC. Mediastinal and left pleural drains are present. Epicardial pacing leads. No unexpected   radiopaque foreign bodies.    Left basilar atelectasis. No significant pleural effusion or pneumothorax. Nonenlarged heart with postoperative changes from CABG. Mitral annular calcifications. Prior lumbar vertebroplasties.   XR Chest Port 1 View    Impression    IMPRESSION:   *  Ill-defined hyperdensities overlie the midline upper abdomen and midline lower chest (arrows) and favored to be extrinsic to the patient, but mimics the appearance of a retrocardiac opacity. Recommend repeating portable chest radiograph after removal   of all overlying objects.  *  Left-sided chest tube with no pneumothorax. Possible trace left pleural effusion.  *  Mild cardiomegaly with CABG.  *  Right subclavian central catheter tip in the RA/SVC junction.       Labs, personally reviewed.  Hemoglobin   Date Value Ref Range Status   10/26/2024 8.9 (L) 11.7 - 15.7 g/dL Final   10/25/2024 11.0 (L) 11.7 - 15.7 g/dL Final    10/25/2024 10.4 (L) 11.7 - 15.7 g/dL Final     Hemoglobin POCT   Date Value Ref Range Status   10/25/2024 10.5 (L) 11.7 - 15.7 g/dL Final   10/25/2024 10.5 (L) 11.7 - 15.7 g/dL Final   10/25/2024 9.3 (L) 11.7 - 15.7 g/dL Final     WBC Count   Date Value Ref Range Status   10/26/2024 15.0 (H) 4.0 - 11.0 10e3/uL Final   10/25/2024 13.6 (H) 4.0 - 11.0 10e3/uL Final   10/25/2024 19.0 (H) 4.0 - 11.0 10e3/uL Final     Platelet Count   Date Value Ref Range Status   10/26/2024 202 150 - 450 10e3/uL Final   10/25/2024 190 150 - 450 10e3/uL Final   10/25/2024 173 150 - 450 10e3/uL Final     Creatinine   Date Value Ref Range Status   10/25/2024 0.75 0.51 - 0.95 mg/dL Final   10/25/2024 0.69 0.51 - 0.95 mg/dL Final   10/25/2024 0.68 0.51 - 0.95 mg/dL Final     Potassium   Date Value Ref Range Status   10/26/2024 4.0 3.4 - 5.3 mmol/L Final   10/25/2024 4.0 3.4 - 5.3 mmol/L Final   10/25/2024 4.2 3.4 - 5.3 mmol/L Final   10/25/2024 4.2 3.4 - 5.3 mmol/L Final   10/29/2021 4.3 3.5 - 5.0 mmol/L Final   10/20/2020 4.1 3.5 - 5.0 mmol/L Final   04/23/2019 5.2 (H) 3.5 - 5.0 mmol/L Final     Potassium POCT   Date Value Ref Range Status   10/25/2024 4.2 3.4 - 5.3 mmol/L Final   10/25/2024 4.6 3.4 - 5.3 mmol/L Final   10/25/2024 5.7 (H) 3.4 - 5.3 mmol/L Final     Magnesium   Date Value Ref Range Status   10/26/2024 2.6 (H) 1.7 - 2.3 mg/dL Final   10/25/2024 3.9 (H) 1.7 - 2.3 mg/dL Final   10/24/2024 2.0 1.7 - 2.3 mg/dL Final          I/O:  I/O last 3 completed shifts:  In: 3234.2 [I.V.:2002.2; Other:132; IV Piggyback:100]  Out: 3325 [Urine:2775; Chest Tube:550]       Physical Exam:    General: Patient seen in bed. NAD. Conversant. Pleasant  CV: RRR on monitor. 2+ peripheral pulses in all extremities. Mild edema. Incision C/D/I.  Pulm: Non-labored effort on nasal cannula. Chest tubes in place, serosanguinous output, no air leak.  Abd: Soft, NT, ND  : Bojorquez with dominique urine  Ext: Mild pedal edema, SCDs in place, warm, distal pulses  intact  Neuro: CNs grossly intact      ASSESSMENT/PLAN:    Holly Beal is a 69 year old female with a history of CAD who is s/p CABGx5.    Active Problems:    Calcium kidney stone    Kidney stone on left side    Asthma    Abnormal stress test    Status post coronary angiogram    Chest pain    Accelerating angina (H)    Mild intermittent asthma without complication        NEURO:   - Scheduled Tylenol/lidocaine patches and PRN Tylenol/oxycodone/dilaudid/Toradol for pain  - PRN Atarax    CV:   - Pre-op EF 60-65%  - Normotensive off pressors  - Metoprolol 12.5mg two times a day   - Amlodipine 2.5mg daily (radial artery graft protection); can turn off nicardipine 2 hours after admin  - ASA 162mg daily  - Rosuvastatin 40mg daily  - Chest tubes to remain today     PULM:   - Extubated on POD0  - Maintaining oxygen saturations on nasal cannula  - Encourage pulmonary toilet    FEN/GI:  - Continue electrolyte replacement protocol  - Cardiac; ADAT  - Bowel regimen  - PTA Vit B12    RENAL:  - Adequate UOP/hr. Continue to monitor closely.  - Cr 0.75  - Bojorquez to remain in for close monitoring of I/O and during period of diuresis/relative immobility  - Diuresis with 50mg IV Lasix TID    HEME:  - Acute blood loss anemia post-op.   - Hgb stable, no bleeding concerns. Hep SQ, ASA    ID:  - Ariadna op ppx complete, afebrile No concerns for infection    ENDO:   - Transition to sliding scale insulin    PPx:   - DVT: SCDs, SQ heparin TID, ambulation   - GI: Protonix 40mg IV/PO daily    DISPO:   - Transfer to general telemetry status  - Medically Ready for Discharge: Anticipated in 5+ Days         Patient discussed with Dr. Lima.        _______  Vanesa Oreilly PA-C  Cardiothoracic Surgery  [phone]

## 2024-10-26 NOTE — PLAN OF CARE
[facility] - ICU    RN Progress Note:            Pertinent Assessments:      Please refer to flowsheet rows for full assessment     - anxiety           Key Events - This Shift:       - Atarax was ordered PRN  - Norvasc, lopressor and Lasix were started  - Able to stand and ambulate in the room with assist of 1 due to cords and lines, but noted with stable gait  - downgraded to cardiac telemetry  - report given to Samm         Point of Contact Update: Name: Tyler  Phone Number: in file  Summary of Conversation:  at bedside and is aware regarding the plan of care and downgrade to cardiac telemetry

## 2024-10-27 ENCOUNTER — APPOINTMENT (OUTPATIENT)
Dept: OCCUPATIONAL THERAPY | Facility: HOSPITAL | Age: 69
DRG: 234 | End: 2024-10-27
Attending: STUDENT IN AN ORGANIZED HEALTH CARE EDUCATION/TRAINING PROGRAM
Payer: COMMERCIAL

## 2024-10-27 PROBLEM — K21.9 GASTROESOPHAGEAL REFLUX DISEASE WITHOUT ESOPHAGITIS: Status: ACTIVE | Noted: 2024-10-27

## 2024-10-27 PROBLEM — T40.2X5A THERAPEUTIC OPIOID INDUCED CONSTIPATION: Status: ACTIVE | Noted: 2024-10-27

## 2024-10-27 PROBLEM — R53.81 PHYSICAL DECONDITIONING: Status: ACTIVE | Noted: 2024-10-27

## 2024-10-27 PROBLEM — I25.10 CORONARY ARTERY DISEASE DUE TO LIPID RICH PLAQUE: Status: ACTIVE | Noted: 2024-10-27

## 2024-10-27 PROBLEM — I25.83 CORONARY ARTERY DISEASE DUE TO LIPID RICH PLAQUE: Status: ACTIVE | Noted: 2024-10-27

## 2024-10-27 PROBLEM — K59.03 THERAPEUTIC OPIOID INDUCED CONSTIPATION: Status: ACTIVE | Noted: 2024-10-27

## 2024-10-27 PROBLEM — E11.9 DIABETES MELLITUS TYPE II, NON INSULIN DEPENDENT (H): Status: ACTIVE | Noted: 2024-10-27

## 2024-10-27 LAB
ALBUMIN UR-MCNC: 30 MG/DL
ANION GAP SERPL CALCULATED.3IONS-SCNC: 12 MMOL/L (ref 7–15)
APPEARANCE UR: ABNORMAL
BILIRUB UR QL STRIP: NEGATIVE
BUN SERPL-MCNC: 23.2 MG/DL (ref 8–23)
CA-I BLD-MCNC: 4.4 MG/DL (ref 4.4–5.2)
CALCIUM SERPL-MCNC: 8.5 MG/DL (ref 8.8–10.4)
CHLORIDE SERPL-SCNC: 100 MMOL/L (ref 98–107)
COLOR UR AUTO: ABNORMAL
CREAT SERPL-MCNC: 0.87 MG/DL (ref 0.51–0.95)
EGFRCR SERPLBLD CKD-EPI 2021: 72 ML/MIN/1.73M2
ERYTHROCYTE [DISTWIDTH] IN BLOOD BY AUTOMATED COUNT: 12.6 % (ref 10–15)
GLUCOSE BLDC GLUCOMTR-MCNC: 205 MG/DL (ref 70–99)
GLUCOSE BLDC GLUCOMTR-MCNC: 262 MG/DL (ref 70–99)
GLUCOSE BLDC GLUCOMTR-MCNC: 281 MG/DL (ref 70–99)
GLUCOSE BLDC GLUCOMTR-MCNC: 284 MG/DL (ref 70–99)
GLUCOSE BLDC GLUCOMTR-MCNC: 315 MG/DL (ref 70–99)
GLUCOSE SERPL-MCNC: 200 MG/DL (ref 70–99)
GLUCOSE UR STRIP-MCNC: 500 MG/DL
HCO3 SERPL-SCNC: 27 MMOL/L (ref 22–29)
HCT VFR BLD AUTO: 29.6 % (ref 35–47)
HGB BLD-MCNC: 9.3 G/DL (ref 11.7–15.7)
HGB UR QL STRIP: ABNORMAL
HOLD SPECIMEN: NORMAL
HYALINE CASTS: 5 /LPF
KETONES UR STRIP-MCNC: NEGATIVE MG/DL
LEUKOCYTE ESTERASE UR QL STRIP: ABNORMAL
MAGNESIUM SERPL-MCNC: 2.3 MG/DL (ref 1.7–2.3)
MCH RBC QN AUTO: 29 PG (ref 26.5–33)
MCHC RBC AUTO-ENTMCNC: 31.4 G/DL (ref 31.5–36.5)
MCV RBC AUTO: 92 FL (ref 78–100)
NITRATE UR QL: NEGATIVE
PH UR STRIP: 5.5 [PH] (ref 5–7)
PHOSPHATE SERPL-MCNC: 2.3 MG/DL (ref 2.5–4.5)
PLATELET # BLD AUTO: 197 10E3/UL (ref 150–450)
POTASSIUM SERPL-SCNC: 3.9 MMOL/L (ref 3.4–5.3)
RBC # BLD AUTO: 3.21 10E6/UL (ref 3.8–5.2)
RBC URINE: 4 /HPF
SODIUM SERPL-SCNC: 139 MMOL/L (ref 135–145)
SP GR UR STRIP: 1.02 (ref 1–1.03)
SQUAMOUS EPITHELIAL: 6 /HPF
TRANSITIONAL EPI: 3 /HPF
UROBILINOGEN UR STRIP-MCNC: <2 MG/DL
WBC # BLD AUTO: 17.9 10E3/UL (ref 4–11)
WBC URINE: 105 /HPF

## 2024-10-27 PROCEDURE — 250N000013 HC RX MED GY IP 250 OP 250 PS 637: Performed by: PHYSICIAN ASSISTANT

## 2024-10-27 PROCEDURE — 84100 ASSAY OF PHOSPHORUS: CPT | Performed by: PHYSICIAN ASSISTANT

## 2024-10-27 PROCEDURE — 83735 ASSAY OF MAGNESIUM: CPT | Performed by: PHYSICIAN ASSISTANT

## 2024-10-27 PROCEDURE — 36415 COLL VENOUS BLD VENIPUNCTURE: CPT | Performed by: PHYSICIAN ASSISTANT

## 2024-10-27 PROCEDURE — 272N000202 HC AEROBIKA WITH MANOMETER

## 2024-10-27 PROCEDURE — 97535 SELF CARE MNGMENT TRAINING: CPT | Mod: GO

## 2024-10-27 PROCEDURE — 94799 UNLISTED PULMONARY SVC/PX: CPT

## 2024-10-27 PROCEDURE — 80048 BASIC METABOLIC PNL TOTAL CA: CPT | Performed by: PHYSICIAN ASSISTANT

## 2024-10-27 PROCEDURE — 85018 HEMOGLOBIN: CPT | Performed by: PHYSICIAN ASSISTANT

## 2024-10-27 PROCEDURE — 81001 URINALYSIS AUTO W/SCOPE: CPT | Performed by: PHYSICIAN ASSISTANT

## 2024-10-27 PROCEDURE — 250N000011 HC RX IP 250 OP 636: Performed by: PHYSICIAN ASSISTANT

## 2024-10-27 PROCEDURE — 250N000013 HC RX MED GY IP 250 OP 250 PS 637: Performed by: HOSPITALIST

## 2024-10-27 PROCEDURE — 250N000012 HC RX MED GY IP 250 OP 636 PS 637: Performed by: PHYSICIAN ASSISTANT

## 2024-10-27 PROCEDURE — 210N000001 HC R&B IMCU HEART CARE

## 2024-10-27 PROCEDURE — 999N000157 HC STATISTIC RCP TIME EA 10 MIN

## 2024-10-27 PROCEDURE — 99232 SBSQ HOSP IP/OBS MODERATE 35: CPT | Performed by: HOSPITALIST

## 2024-10-27 PROCEDURE — 82330 ASSAY OF CALCIUM: CPT | Performed by: PHYSICIAN ASSISTANT

## 2024-10-27 PROCEDURE — 87086 URINE CULTURE/COLONY COUNT: CPT | Performed by: PHYSICIAN ASSISTANT

## 2024-10-27 PROCEDURE — 97110 THERAPEUTIC EXERCISES: CPT | Mod: GO

## 2024-10-27 RX ORDER — METOPROLOL TARTRATE 25 MG/1
25 TABLET, FILM COATED ORAL 2 TIMES DAILY
Status: DISCONTINUED | OUTPATIENT
Start: 2024-10-27 | End: 2024-10-28

## 2024-10-27 RX ORDER — ACETAMINOPHEN 325 MG/1
975 TABLET ORAL 3 TIMES DAILY
Status: DISCONTINUED | OUTPATIENT
Start: 2024-10-27 | End: 2024-10-31 | Stop reason: HOSPADM

## 2024-10-27 RX ORDER — ACETAMINOPHEN 650 MG/1
650 SUPPOSITORY RECTAL 3 TIMES DAILY
Status: DISCONTINUED | OUTPATIENT
Start: 2024-10-27 | End: 2024-10-31 | Stop reason: HOSPADM

## 2024-10-27 RX ORDER — OMEPRAZOLE 20 MG/1
20 TABLET, DELAYED RELEASE ORAL EVERY EVENING
Status: DISCONTINUED | OUTPATIENT
Start: 2024-10-27 | End: 2024-10-31 | Stop reason: HOSPADM

## 2024-10-27 RX ADMIN — HEPARIN SODIUM 5000 UNITS: 10000 INJECTION, SOLUTION INTRAVENOUS; SUBCUTANEOUS at 12:42

## 2024-10-27 RX ADMIN — HYDROXYZINE HYDROCHLORIDE 25 MG: 25 TABLET, FILM COATED ORAL at 20:43

## 2024-10-27 RX ADMIN — AMLODIPINE BESYLATE 2.5 MG: 2.5 TABLET ORAL at 09:45

## 2024-10-27 RX ADMIN — POLYETHYLENE GLYCOL 3350 17 G: 17 POWDER, FOR SOLUTION ORAL at 09:45

## 2024-10-27 RX ADMIN — POTASSIUM & SODIUM PHOSPHATES POWDER PACK 280-160-250 MG 1 PACKET: 280-160-250 PACK at 09:46

## 2024-10-27 RX ADMIN — CYANOCOBALAMIN TAB 1000 MCG 1000 MCG: 1000 TAB at 09:46

## 2024-10-27 RX ADMIN — ACETAMINOPHEN 975 MG: 325 TABLET ORAL at 20:37

## 2024-10-27 RX ADMIN — ACETAMINOPHEN 975 MG: 325 TABLET ORAL at 04:00

## 2024-10-27 RX ADMIN — METOPROLOL TARTRATE 12.5 MG: 25 TABLET, FILM COATED ORAL at 12:41

## 2024-10-27 RX ADMIN — ROSUVASTATIN CALCIUM 40 MG: 40 TABLET, FILM COATED ORAL at 20:39

## 2024-10-27 RX ADMIN — FUROSEMIDE 40 MG: 10 INJECTION, SOLUTION INTRAVENOUS at 06:32

## 2024-10-27 RX ADMIN — OMEPRAZOLE 20 MG: 20 TABLET, DELAYED RELEASE ORAL at 20:51

## 2024-10-27 RX ADMIN — MAGNESIUM HYDROXIDE 30 ML: 400 SUSPENSION ORAL at 16:31

## 2024-10-27 RX ADMIN — FUROSEMIDE 40 MG: 10 INJECTION, SOLUTION INTRAVENOUS at 17:14

## 2024-10-27 RX ADMIN — SENNOSIDES AND DOCUSATE SODIUM 1 TABLET: 8.6; 5 TABLET ORAL at 09:45

## 2024-10-27 RX ADMIN — INSULIN ASPART 3 UNITS: 100 INJECTION, SOLUTION INTRAVENOUS; SUBCUTANEOUS at 18:15

## 2024-10-27 RX ADMIN — HEPARIN SODIUM 5000 UNITS: 10000 INJECTION, SOLUTION INTRAVENOUS; SUBCUTANEOUS at 20:44

## 2024-10-27 RX ADMIN — INSULIN ASPART 4 UNITS: 100 INJECTION, SOLUTION INTRAVENOUS; SUBCUTANEOUS at 14:54

## 2024-10-27 RX ADMIN — ASPIRIN 81 MG CHEWABLE TABLET 162 MG: 81 TABLET CHEWABLE at 09:46

## 2024-10-27 RX ADMIN — POTASSIUM & SODIUM PHOSPHATES POWDER PACK 280-160-250 MG 1 PACKET: 280-160-250 PACK at 13:59

## 2024-10-27 RX ADMIN — METOPROLOL TARTRATE 12.5 MG: 25 TABLET, FILM COATED ORAL at 04:00

## 2024-10-27 RX ADMIN — HEPARIN SODIUM 5000 UNITS: 10000 INJECTION, SOLUTION INTRAVENOUS; SUBCUTANEOUS at 04:00

## 2024-10-27 RX ADMIN — POTASSIUM & SODIUM PHOSPHATES POWDER PACK 280-160-250 MG 1 PACKET: 280-160-250 PACK at 06:32

## 2024-10-27 RX ADMIN — LIDOCAINE 2 PATCH: 4 PATCH TOPICAL at 17:14

## 2024-10-27 RX ADMIN — SENNOSIDES AND DOCUSATE SODIUM 1 TABLET: 8.6; 5 TABLET ORAL at 20:40

## 2024-10-27 RX ADMIN — ACETAMINOPHEN 975 MG: 325 TABLET ORAL at 13:59

## 2024-10-27 RX ADMIN — FUROSEMIDE 40 MG: 10 INJECTION, SOLUTION INTRAVENOUS at 12:41

## 2024-10-27 RX ADMIN — METOPROLOL TARTRATE 12.5 MG: 25 TABLET, FILM COATED ORAL at 16:30

## 2024-10-27 RX ADMIN — METFORMIN ER 500 MG 500 MG: 500 TABLET ORAL at 18:08

## 2024-10-27 NOTE — PROGRESS NOTES
[facility]    Medicine Progress Note - Hospitalist Service    Date of Admission:  10/23/2024    Assessment & Plan                Holly Beal is a 69 year old female with HTN, HL, DM2, accelerating angina who had an abnormal stress test leading to a coronary angiogram on 10/23 which showed multi-vessel CAD.  Patient is now status post CABG x 5.  Doing fairly well postoperatively. Hospital Day: 5       Multi-vessel CAD  -- Coronary angiogram completed on 10/23 which showed multivessel disease  -- S/p CABG x5 on 10/25 by Dr. Fuentes  -- , Home Crestor increased to 40mg every day  -- Postop cares per CV Surgery  - Started on metoprolol  -On IV Lasix  -- Chest tubes and Bojorquez catheter out today.  She seems a bit deconditioned but she is hoping to be able to go home rather than TCU     Acute blood loss anemia  -- Monitor CBC  -Hemoglobin laure 8.9, has not needed transfusion     Leukocytosis  -- Likely reactive. Monitor cbc and temp curve     Essential HTN  -- Metoprolol, Norvasc  -- Hydralazine iv prn per CV surgery     DM 2  -- home Metformin resumed today  -- Accu-checks, medium sliding scale, hypoglycemia protocol  -- A1c: 7.1%     HLD  -- Continue Increased Crestor at 40mg every day with goal of LDL<70     Mild intermittent asthma w/o acute exacerbation  -- Albuterol inhaler prn     GERD  -- PTA Omeprazole  -Had Protonix ordered but patient has her own supply of omeprazole and prefers to take this, this is fine by me     Anxiety d/o  -- PTA Alprazolam. Prn Atarax    Constipation  -No bowel movement for 5 days.  Reports minimal flatus  -Continue postoperative bowel regimen.  Recommend suppository if she does not have a stool by the end of the day, discussed with patient and nurse          Diet: Low Saturated Fat Na <2400 mg    DVT Prophylaxis: Moderate risk.   Heparin SQ  Bojorquez Catheter: Not present  Lines: None     Cardiac Monitoring: ACTIVE order. Indication: Open heart surgery (72  "hours)  Code Status: Full Code      Clinically Significant Risk Factors         # Hypernatremia: Highest Na = 149 mmol/L in last 2 days, will monitor as appropriate  # Hyperchloremia: Highest Cl = 113 mmol/L in last 2 days, will monitor as appropriate      # Hypocalcemia: Lowest Ca = 7.6 mg/dL in last 2 days, will monitor and replace as appropriate      # Coagulation Defect: INR = 1.31 (Ref range: 0.85 - 1.15) and/or PTT = 66 Seconds (Ref range: 22 - 38 Seconds), will monitor for bleeding             # DMII: A1C = 7.1 % (Ref range: <5.7 %) within past 6 months, PRESENT ON ADMISSION  # Overweight: Estimated body mass index is 25.89 kg/m  as calculated from the following:    Height as of this encounter: 1.676 m (5' 6\").    Weight as of this encounter: 72.8 kg (160 lb 6.4 oz)., PRESENT ON ADMISSION     # Financial/Environmental Concerns: none  # Asthma: noted on problem list        Disposition Plan     Medically Ready for Discharge: Anticipated in 2-4 Days         Discharge barrier(s): Deconditioning, constipation, postop recovery  Care discussed with: Patient, , RN      Esperanza Wilson MD  Hospitalist Service  [facility]  Securely message with [employer] (more info)  Text page via W[employer] Paging/Directory   ______________________________________________________________________      Physical Exam   Vital Signs: Temp: 98.3  F (36.8  C) Temp src: Oral BP: (!) 151/65 Pulse: 81   Resp: 18 SpO2: 97 % O2 Device: None (Room air) Oxygen Delivery: 2 LPM  Weight: 160 lbs 6.4 oz    General: in no apparent distress, non-toxic, and alert female lying in hospital bed oriented x3  HEENT: Head normocephalic atraumatic, oral mucosa moist. Sclerae anicteric  CV: Regular rhythm, normal rate, no murmurs  Resp: No wheezes, no rales or rhonchi, no focal consolidations  GI: Belly soft, nondistended, nontender, bowel sounds present  Skin: No rashes or lesions  Extremities: No peripheral edema  Psych: Normal affect, " mood euthymic  Neuro: Grossly normal      Medical Decision Making               Data   Recent Results (from the past 16 hours)   Ionized Calcium    Collection Time: 10/27/24  4:27 AM   Result Value Ref Range    Calcium Ionized Whole Blood 4.4 4.4 - 5.2 mg/dL   Magnesium    Collection Time: 10/27/24  4:27 AM   Result Value Ref Range    Magnesium 2.3 1.7 - 2.3 mg/dL   Phosphorus    Collection Time: 10/27/24  4:27 AM   Result Value Ref Range    Phosphorus 2.3 (L) 2.5 - 4.5 mg/dL   Basic metabolic panel    Collection Time: 10/27/24  4:27 AM   Result Value Ref Range    Sodium 139 135 - 145 mmol/L    Potassium 3.9 3.4 - 5.3 mmol/L    Chloride 100 98 - 107 mmol/L    Carbon Dioxide (CO2) 27 22 - 29 mmol/L    Anion Gap 12 7 - 15 mmol/L    Urea Nitrogen 23.2 (H) 8.0 - 23.0 mg/dL    Creatinine 0.87 0.51 - 0.95 mg/dL    GFR Estimate 72 >60 mL/min/1.73m2    Calcium 8.5 (L) 8.8 - 10.4 mg/dL    Glucose 200 (H) 70 - 99 mg/dL   Extra Purple Top EDTA (LAB USE ONLY)    Collection Time: 10/27/24  4:28 AM   Result Value Ref Range    Hold Specimen JI    CBC with platelets    Collection Time: 10/27/24  4:28 AM   Result Value Ref Range    WBC Count 17.9 (H) 4.0 - 11.0 10e3/uL    RBC Count 3.21 (L) 3.80 - 5.20 10e6/uL    Hemoglobin 9.3 (L) 11.7 - 15.7 g/dL    Hematocrit 29.6 (L) 35.0 - 47.0 %    MCV 92 78 - 100 fL    MCH 29.0 26.5 - 33.0 pg    MCHC 31.4 (L) 31.5 - 36.5 g/dL    RDW 12.6 10.0 - 15.0 %    Platelet Count 197 150 - 450 10e3/uL   Glucose by meter    Collection Time: 10/27/24  7:00 AM   Result Value Ref Range    GLUCOSE BY METER POCT 205 (H) 70 - 99 mg/dL   Glucose by meter    Collection Time: 10/27/24 11:41 AM   Result Value Ref Range    GLUCOSE BY METER POCT 281 (H) 70 - 99 mg/dL   UA with Microscopic reflex to Culture    Collection Time: 10/27/24 12:42 PM    Specimen: Urine, Clean Catch   Result Value Ref Range    Color Urine Light Yellow Colorless, Straw, Light Yellow, Yellow    Appearance Urine Turbid (A) Clear    Glucose  Urine 500 (A) Negative mg/dL    Bilirubin Urine Negative Negative    Ketones Urine Negative Negative mg/dL    Specific Gravity Urine 1.020 1.001 - 1.030    Blood Urine 0.03 mg/dL (A) Negative    pH Urine 5.5 5.0 - 7.0    Protein Albumin Urine 30 (A) Negative mg/dL    Urobilinogen Urine <2.0 <2.0 mg/dL    Nitrite Urine Negative Negative    Leukocyte Esterase Urine 500 Ronn/uL (A) Negative    RBC Urine 4 (H) <=2 /HPF    WBC Urine 105 (H) <=5 /HPF    Squamous Epithelials Urine 6 (H) <=1 /HPF    Transitional Epithelials Urine 3 (H) <=1 /HPF    Hyaline Casts Urine 5 (H) <=2 /LPF       Interval History     Patient doing pretty well following her procedure.  States she is very weak though.  Has not been participating in cardiac rehab today due to feeling too tired, she had asked them to come back, states she will participate when they return.  Has minimal appetite, sipping on Ensure.  No bowel movement 5 days and states she has not actually been passing flatus either.  Encouraged her to try suppository, she would like to hold off, she is agreeable to have a suppository if she has not had a stool by the end of the day today.  I discussed with nurse.  Had chest tubes and Bojorquez catheter out today.  No urination yet post Bojorquez out but it was only a few minutes ago.  Hospitalist service will continue to follow.   updated at bedside.

## 2024-10-27 NOTE — PLAN OF CARE
Problem: Adult Inpatient Plan of Care  Goal: Plan of Care Review  Description: The Plan of Care Review/Shift note should be completed every shift.  The Outcome Evaluation is a brief statement about your assessment that the patient is improving, declining, or no change.  This information will be displayed automatically on your shift  note.  Outcome: Progressing     Problem: Pain Acute  Goal: Optimal Pain Control and Function  Outcome: Progressing  Intervention: Prevent or Manage Pain  Recent Flowsheet Documentation  Taken 10/26/2024 2112 by Lillie Richmond RN  Medication Review/Management: medications reviewed     Problem: Cardiac Catheterization (Diagnostic/Interventional)  Goal: Absence of Embolism Signs and Symptoms  Intervention: Prevent or Manage Embolism  Recent Flowsheet Documentation  Taken 10/26/2024 2112 by Lillie Richmond RN  VTE Prevention/Management: SCDs on (sequential compression devices)  Goal: Anesthesia/Sedation Recovery  Intervention: Optimize Anesthesia Recovery  Recent Flowsheet Documentation  Taken 10/26/2024 2112 by Lillie Richmond RN  Safety Promotion/Fall Prevention:   activity supervised   nonskid shoes/slippers when out of bed   patient and family education   safety round/check completed  Reorientation Measures:   calendar in view   clock in view  Goal: Absence of Vascular Access Complication  Intervention: Prevent and Manage Access Complications  Recent Flowsheet Documentation  Taken 10/26/2024 2112 by Lillie Richmond RN  Activity Management: back to bed     Problem: Cardiovascular Surgery  Goal: Improved Activity Tolerance  Outcome: Progressing  Goal: Absence of Bleeding  Outcome: Progressing  Goal: Effective Cardiac Function  Outcome: Progressing  Goal: Absence of Infection Signs and Symptoms  Outcome: Progressing  Goal: Anesthesia/Sedation Recovery  Intervention: Optimize Anesthesia Recovery  Recent Flowsheet Documentation  Taken 10/26/2024 2112 by Lillie Richmond RN  Safety Promotion/Fall  Prevention:   activity supervised   nonskid shoes/slippers when out of bed   patient and family education   safety round/check completed  Reorientation Measures:   calendar in view   clock in view  Goal: Effective Oxygenation and Ventilation  Outcome: Progressing     Problem: Risk for Delirium  Goal: Improved Behavioral Control  Intervention: Minimize Safety Risk  Recent Flowsheet Documentation  Taken 10/26/2024 2112 by Lillie Richmond RN  Enhanced Safety Measures: patient/family teach back on injury risk  Goal: Improved Attention and Thought Clarity  Intervention: Maximize Cognitive Function  Recent Flowsheet Documentation  Taken 10/26/2024 2112 by Lillie Richmond RN  Reorientation Measures:   calendar in view   clock in view   Goal Outcome Evaluation:                    ..sc  Patient Name: Holly Beal   MRN: [id number]   Date of Admission: 10/23/2024    Procedure: Procedure(s):  CORONARY ARTERY BYPASS GRAFT TIMES FIVE, LEFT INTERNAL MAMMARY ARTERY HARVEST, LEFT RADIAL ARTERY HARVEST, LEFT AND RIGHT LEG ENDOSCOPIC VESSEL PROCUREMENT, EPIAORTIC ULTRASOUND, ANESTHESIA TRANSESOPHAGEAL ECHOCARDIOGRAM       Post Op day #:1    Subjective (Patient focus/Primary Problem for shift): appetite, back pain           Pain Goal 0/10 Pain Rating 0/10           Pain Medication/ Regime effective to reduce patient pain scheduled pain medication    Objective (Physical assessment):           Rhythm: normal sinus rhythm            Bowel Activity: no if Yes indicate when: .          Bowel Medications: yes            Incision: healing well          Incentive Spirometry Q 1-2 hour when awake:  yes Volume: .          Epicardial Pacing Wires:  yes            Patient Activity:           Up to chair for meals: yes          Ambulation with RN x2 (Not including CR): .            Is patient in home clothes:no             Chest Tubes   Pleural: yes Draining: yes               Suction: yes              Mediastinal: yes Draining: yes                Suction: yes   Dressing Change Daily:yes If No, why?on days                     Urinary Catheter: yes           Preventative WOC consult (need MD order): not applicable       Assessment (Nursing primary shift focus):     Took over patient at 1900.    Patient back in bed.    Alert. Oriented x 4.    Denied shortness of breath. O2 at 2 lpm nc, to keep O2 sat above 92%. Previous RN reported that patient desat to 80's while sleeping.      Plan (Patient Care Plan/focus):     Increase activity.  Better appetite.  Bowel movement.  Chest tube discontinued when ready.      Lillie Richmond RN   10/26/2024   11:48 PM

## 2024-10-27 NOTE — PROGRESS NOTES
"CVTS Daily Progress Note   POD#2 s/p CABGx5  Attending: Gina  LOS: 4    SUBJECTIVE/INTERVAL EVENTS:    No acute events overnight. Normotensive. NSR. Patient progressing well. Maintaining oxygen saturations on room air. Up to chair this AM and working with therapy. Pain well controlled. -BM/+flatus. Limited appetite. UOP adequate. Chest tube output WNL. Patient denies new chest pain, shortness of breath, abdominal pain, calf pain. Patient has no questions today.     OBJECTIVE:  Temp:  [98  F (36.7  C)-99.3  F (37.4  C)] 99.3  F (37.4  C)  Pulse:  [75-90] 90  Resp:  [16-22] 18  BP: (104-146)/(53-60) 122/60  MAP:  [74 mmHg-85 mmHg] 85 mmHg  Arterial Line BP: (116-135)/(50-57) 135/57  SpO2:  [92 %-100 %] 97 %  Vitals:    10/23/24 1233 10/24/24 0248 10/25/24 0403 10/26/24 0630   Weight: 72.1 kg (159 lb) 70.8 kg (156 lb 1.6 oz) 70.6 kg (155 lb 9.6 oz) 75.6 kg (166 lb 9.6 oz)    10/27/24 0631   Weight: 72.8 kg (160 lb 6.4 oz)       Clinically Significant Risk Factors        # Hyperkalemia: Highest K = 5.7 mmol/L in last 2 days, will monitor as appropriate  # Hypernatremia: Highest Na = 149 mmol/L in last 2 days, will monitor as appropriate  # Hyperchloremia: Highest Cl = 113 mmol/L in last 2 days, will monitor as appropriate      # Hypocalcemia: Lowest Ca = 7.6 mg/dL in last 2 days, will monitor and replace as appropriate        # Coagulation Defect: INR = 1.31 (Ref range: 0.85 - 1.15) and/or PTT = 66 Seconds (Ref range: 22 - 38 Seconds), will monitor for bleeding             # DMII: A1C = 7.1 % (Ref range: <5.7 %) within past 6 months, PRESENT ON ADMISSION  # Overweight: Estimated body mass index is 25.89 kg/m  as calculated from the following:    Height as of this encounter: 1.676 m (5' 6\").    Weight as of this encounter: 72.8 kg (160 lb 6.4 oz)., PRESENT ON ADMISSION     # Financial/Environmental Concerns: none  # Asthma: noted on problem list                 Current Medications:    Scheduled Meds:  Current " Facility-Administered Medications   Medication Dose Route Frequency Provider Last Rate Last Admin    acetaminophen (TYLENOL) Suppository 650 mg  650 mg Rectal Q8H Vanesa Oreilly PA-C   650 mg at 10/25/24 1621    acetaminophen (TYLENOL) tablet 975 mg  975 mg Oral Q8H Vanesa Oreilly PA-C   975 mg at 10/27/24 0400    amLODIPine (NORVASC) tablet 2.5 mg  2.5 mg Oral Daily Vanesa Oreilly PA-C   2.5 mg at 10/26/24 0800    aspirin (ASA) chewable tablet 162 mg  162 mg Oral or NG Tube Daily Vanesa Oreilly PA-C   162 mg at 10/26/24 0756    Or    aspirin (ASA) Suppository 300 mg  300 mg Rectal Daily Vanesa Oreilly PA-C        cyanocobalamin (VITAMIN B-12) tablet 1,000 mcg  1,000 mcg Oral Daily Vanesa Oreilly PA-C   1,000 mcg at 10/26/24 0755    furosemide (LASIX) injection 40 mg  40 mg Intravenous TID Vanesa Oreilly PA-C   40 mg at 10/27/24 0632    heparin ANTICOAGULANT injection 5,000 Units  5,000 Units Subcutaneous Q8H Vanesa Oreilly PA-C   5,000 Units at 10/27/24 0400    insulin aspart (NovoLOG) injection (RAPID ACTING)  1-7 Units Subcutaneous TID AC Vanesa Oreilly PA-C   1 Units at 10/26/24 1203    insulin aspart (NovoLOG) injection (RAPID ACTING)  1-5 Units Subcutaneous At Bedtime Vanesa Oreilly PA-C   1 Units at 10/26/24 2126    Lidocaine (LIDOCARE) 4 % Patch 1-2 patch  1-2 patch Transdermal Q24H Vanesa Oreilly PA-C   1 patch at 10/26/24 1747    metFORMIN (GLUCOPHAGE XR) 24 hr tablet 500 mg  500 mg Oral Daily with supper Vanesa Oreilly PA-C        metoprolol tartrate (LOPRESSOR) half-tab 12.5 mg  12.5 mg Oral TID Vanesa Oreilly PA-C   12.5 mg at 10/27/24 0400    metoprolol tartrate (LOPRESSOR) tablet 25 mg  25 mg Oral BID Vanesa Oreilly PA-C        pantoprazole (PROTONIX) 2 mg/mL suspension 40 mg  40 mg Oral or NG Tube Daily Vanesa Oreilly PA-C        Or    pantoprazole (PROTONIX) EC  tablet 40 mg  40 mg Oral Daily Vanesa Oreilly PA-C   40 mg at 10/26/24 0756    polyethylene glycol (MIRALAX) Packet 17 g  17 g Oral Daily Vanesa Oreilly PA-C   17 g at 10/26/24 0755    potassium & sodium phosphates (NEUTRA-PHOS) Packet 1 packet  1 packet Oral or Feeding Tube Q4H Vanesa Oreilly PA-C   1 packet at 10/27/24 0632    rosuvastatin (CRESTOR) tablet 40 mg  40 mg Oral Daily Vanesa Oreilly PA-C   40 mg at 10/26/24 2110    senna-docusate (SENOKOT-S/PERICOLACE) 8.6-50 MG per tablet 1 tablet  1 tablet Oral BID Vanesa Oreilly PA-C   1 tablet at 10/26/24 2111     Continuous Infusions:  Current Facility-Administered Medications   Medication Dose Route Frequency Provider Last Rate Last Admin     PRN Meds:.  Current Facility-Administered Medications   Medication Dose Route Frequency Provider Last Rate Last Admin    albuterol (PROVENTIL HFA/VENTOLIN HFA) inhaler  2 puff Inhalation Q6H PRN Russell Cheng MD        bisacodyl (DULCOLAX) suppository 10 mg  10 mg Rectal Daily PRN Vanesa Oreilly PA-C        calcium gluconate 1 g in 50 mL in sodium chloride intermittent infusion  1 g Intravenous Once PRN Vanesa Oreilly PA-C   1 g at 10/25/24 1353    calcium gluconate 2 g in  mL intermittent infusion  2 g Intravenous Once PRN Vanesa Oreilly PA-C        calcium gluconate 3 g in sodium chloride 0.9 % 100 mL intermittent infusion  3 g Intravenous Once PRN Vanesa Oreilly PA-C        glucose gel 15-30 g  15-30 g Oral Q15 Min PRN Vanesa Oreilly PA-C        Or    dextrose 50 % injection 25-50 mL  25-50 mL Intravenous Q15 Min PRN Vanesa Oreilly PA-C        Or    glucagon injection 1 mg  1 mg Subcutaneous Q15 Min PRN Vanesa Oreilly PA-C        hydrALAZINE (APRESOLINE) injection 10 mg  10 mg Intravenous Q30 Min PRN Vanesa Oreilly PA-C        HYDROmorphone (DILAUDID) injection 0.2 mg  0.2 mg Intravenous Q2H PRN  Vanesa Oreilly PA-C   0.2 mg at 10/25/24 2124    Or    HYDROmorphone (PF) (DILAUDID) injection 0.4 mg  0.4 mg Intravenous Q2H PRN Vanesa Oreilly PA-C        hydrOXYzine HCl (ATARAX) tablet 25 mg  25 mg Oral Q6H PRN Vanesa Oreilly PA-C        Or    hydrOXYzine HCl (ATARAX) tablet 50 mg  50 mg Oral Q6H PRN Vanesa Oreilly PA-C        ketorolac (TORADOL) injection 30 mg  30 mg Intravenous Q6H PRN Vanesa Oreilly PA-C        lactated ringers BOLUS 250 mL  250 mL Intravenous Q15 Min PRN Vanesa Oreilly PA-C        magnesium hydroxide (MILK OF MAGNESIA) suspension 30 mL  30 mL Oral Daily PRN Vanesa Oreilly PA-C        naloxone (NARCAN) injection 0.2 mg  0.2 mg Intravenous Q2 Min PRN Vanesa Oreilly PA-C        Or    naloxone (NARCAN) injection 0.4 mg  0.4 mg Intravenous Q2 Min PRN Vanesa Oreilly PA-C        Or    naloxone (NARCAN) injection 0.2 mg  0.2 mg Intramuscular Q2 Min PRN Vanesa Oreilly PA-C        Or    naloxone (NARCAN) injection 0.4 mg  0.4 mg Intramuscular Q2 Min PRN Vanesa Oreilly PA-C        ondansetron (ZOFRAN ODT) ODT tab 4 mg  4 mg Oral Q6H PRN Vanesa Oreilly PA-C   4 mg at 10/26/24 1745    Or    ondansetron (ZOFRAN) injection 4 mg  4 mg Intravenous Q6H PRN Vanesa Oreilly PA-C   4 mg at 10/26/24 0901    oxyCODONE (ROXICODONE) tablet 5 mg  5 mg Oral Q4H PRN Vanesa Oreilly PA-C   5 mg at 10/26/24 1746    Or    oxyCODONE (ROXICODONE) tablet 10 mg  10 mg Oral Q4H PRN Vanesa Oreilly PA-C        prochlorperazine (COMPAZINE) injection 5 mg  5 mg Intravenous Q6H PRN Vanesa Oreilly PA-C   5 mg at 10/25/24 2100    Or    prochlorperazine (COMPAZINE) tablet 5 mg  5 mg Oral Q6H PRN Vanesa Oreilly PA-C           Cardiographics:    Telemetry monitoring demonstrates NSR with rates in the 70s per my personal review.    Imaging:  Results for orders placed or performed during  the hospital encounter of 10/23/24   US Carotid Bilateral    Impression    IMPRESSION:  1.  Mild plaque formation, velocities consistent with less than 50% stenosis in the right internal carotid artery.  2.  Mild plaque formation, velocities consistent with less than 50% stenosis in the left internal carotid artery.  3.  Flow within the vertebral arteries is antegrade.   XR Chest Port 1 View    Impression    IMPRESSION: Endotracheal tube tip terminates in the mid thoracic trachea approximately 4 cm from the venice. Right IJ introducer tip terminates in the lower SVC. Mediastinal and left pleural drains are present. Epicardial pacing leads. No unexpected   radiopaque foreign bodies.    Left basilar atelectasis. No significant pleural effusion or pneumothorax. Nonenlarged heart with postoperative changes from CABG. Mitral annular calcifications. Prior lumbar vertebroplasties.   XR Chest Port 1 View    Impression    IMPRESSION:   *  Ill-defined hyperdensities overlie the midline upper abdomen and midline lower chest (arrows) and favored to be extrinsic to the patient, but mimics the appearance of a retrocardiac opacity. Recommend repeating portable chest radiograph after removal   of all overlying objects.  *  Left-sided chest tube with no pneumothorax. Possible trace left pleural effusion.  *  Mild cardiomegaly with CABG.  *  Right subclavian central catheter tip in the RA/SVC junction.       Labs, personally reviewed.  Hemoglobin   Date Value Ref Range Status   10/27/2024 9.3 (L) 11.7 - 15.7 g/dL Final   10/26/2024 8.9 (L) 11.7 - 15.7 g/dL Final   10/25/2024 11.0 (L) 11.7 - 15.7 g/dL Final     Hemoglobin POCT   Date Value Ref Range Status   10/25/2024 10.5 (L) 11.7 - 15.7 g/dL Final   10/25/2024 10.5 (L) 11.7 - 15.7 g/dL Final   10/25/2024 9.3 (L) 11.7 - 15.7 g/dL Final     WBC Count   Date Value Ref Range Status   10/27/2024 17.9 (H) 4.0 - 11.0 10e3/uL Final   10/26/2024 15.0 (H) 4.0 - 11.0 10e3/uL Final    10/25/2024 13.6 (H) 4.0 - 11.0 10e3/uL Final     Platelet Count   Date Value Ref Range Status   10/27/2024 197 150 - 450 10e3/uL Final   10/26/2024 202 150 - 450 10e3/uL Final   10/25/2024 190 150 - 450 10e3/uL Final     Creatinine   Date Value Ref Range Status   10/27/2024 0.87 0.51 - 0.95 mg/dL Final   10/25/2024 0.75 0.51 - 0.95 mg/dL Final   10/25/2024 0.69 0.51 - 0.95 mg/dL Final     Potassium   Date Value Ref Range Status   10/27/2024 3.9 3.4 - 5.3 mmol/L Final   10/26/2024 4.0 3.4 - 5.3 mmol/L Final   10/25/2024 4.0 3.4 - 5.3 mmol/L Final   10/29/2021 4.3 3.5 - 5.0 mmol/L Final   10/20/2020 4.1 3.5 - 5.0 mmol/L Final   04/23/2019 5.2 (H) 3.5 - 5.0 mmol/L Final     Potassium POCT   Date Value Ref Range Status   10/25/2024 4.2 3.4 - 5.3 mmol/L Final   10/25/2024 4.6 3.4 - 5.3 mmol/L Final   10/25/2024 5.7 (H) 3.4 - 5.3 mmol/L Final     Magnesium   Date Value Ref Range Status   10/27/2024 2.3 1.7 - 2.3 mg/dL Final   10/26/2024 2.6 (H) 1.7 - 2.3 mg/dL Final   10/25/2024 3.9 (H) 1.7 - 2.3 mg/dL Final          I/O:  I/O last 3 completed shifts:  In: 1506.1 [P.O.:1410; I.V.:96.1]  Out: 3220 [Urine:2950; Chest Tube:270]       Physical Exam:    General: Patient seen in bed. NAD. Conversant. Pleasant  CV: RRR on monitor. 2+ peripheral pulses in all extremities. Mild edema. Incision C/D/I.  Pulm: Non-labored effort on room air.   Abd: Soft, NT, ND  Ext: Mild pedal edema, SCDs in place, warm, distal pulses intact  Neuro: CNs grossly intact      ASSESSMENT/PLAN:    Holly Beal is a 69 year old female with a history of CAD who is s/p CABGx5.    Principal Problem:    Coronary artery disease due to lipid rich plaque  Active Problems:    Calcium kidney stone    Kidney stone on left side    Asthma    Abnormal stress test    Status post coronary angiogram    Chest pain    Accelerating angina (H)    Mild intermittent asthma without complication        NEURO:   - Scheduled Tylenol/lidocaine patches and PRN  Tylenol/oxycodone/dilaudid/Toradol for pain  - PRN Atarax    CV:   - Pre-op EF 60-65%  - Normotensive  - Metoprolol 12.5mg BID increased to 25mg BID  - Amlodipine 2.5mg daily (radial artery graft protection)  - ASA 162mg daily  - Rosuvastatin 40mg daily  - Chest tubes and TPW removed this AM    PULM:   - Extubated on POD0  - Maintaining oxygen saturations on room air  - Encourage pulmonary toilet    FEN/GI:  - Continue electrolyte replacement protocol  - Cardiac; ADAT  - Bowel regimen  - PTA Vit B12    RENAL:  - Adequate UOP/hr. Continue to monitor closely.  - Diuresis with 40mg IV Lasix TID    HEME:  - Acute blood loss anemia post-op.   - No bleeding concerns. Hep SQ, ASA    ID:  - [place] op ppx complete, afebrile No concerns for infection    ENDO:   - SSI  - Restarting PTA Metformin    PPx:   - DVT: SCDs, SQ heparin TID, ambulation   - GI: Protonix 40mg IV/PO daily    DISPO:   - General telemetry status  - Medically Ready for Discharge: Anticipated in 2-4 Days           Patient discussed with Dr. Lima.        _______  Vanesa Oreilly PA-C  Cardiothoracic Surgery  [phone]

## 2024-10-27 NOTE — PLAN OF CARE
Patient Name: Holly Beal   MRN: [id number]   Date of Admission: 10/23/2024     Procedure: Procedure(s):  CORONARY ARTERY BYPASS GRAFT TIMES FIVE, LEFT INTERNAL MAMMARY ARTERY HARVEST, LEFT RADIAL ARTERY HARVEST, LEFT AND RIGHT LEG ENDOSCOPIC VESSEL PROCUREMENT, EPIAORTIC ULTRASOUND, ANESTHESIA TRANSESOPHAGEAL ECHOCARDIOGRAM     Post Op day #:1     Subjective (Patient focus/Primary Problem for shift): Sleep          Pain Goal 0 Pain Rating 2           Pain Medication/ Regime effective to reduce patient pain Yes, pt on ATC Tylenol. No PRNs given.     Objective (Physical assessment):           Rhythm: SR with BBB             Bowel Activity: no          Bowel Medications: yes             Incision: healing well          Incentive Spirometry Q 1-2 hour when awake:  yes Volume: 1100          Epicardial Pacing Wires:  yes             Patient Activity:           Up to chair for meals: yes          Ambulation with RN x2 (Not including CR): No. Pt up to recliner this AM.            Is patient in home clothes:no              Chest Tubes              Pleural: yes Draining: yes               Suction: yes              Mediastinal: yes Draining: yes               Suction: yes              Dressing Change Daily:yes                      Urinary Catheter: yes            Preventative WOC consult (need MD order): no         Assessment (Nursing primary shift focus): No issues overnight. Vitals stable. Pt placed on 2LNC by evening shift for desaturation while sleeping, but is now in RA while awake. Pt declined any PRN pain medications. Pt encouraged to use IS, flutter valve and ambulate today.       Angus Macdonald RN  10/27/24 0615

## 2024-10-27 NOTE — PROGRESS NOTES
sc  Patient Name: Holly Beal   MRN: [id number]   Date of Admission: 10/23/2024    Procedure: Procedure(s):  CORONARY ARTERY BYPASS GRAFT TIMES FIVE, LEFT INTERNAL MAMMARY ARTERY HARVEST, LEFT RADIAL ARTERY HARVEST, LEFT AND RIGHT LEG ENDOSCOPIC VESSEL PROCUREMENT, EPIAORTIC ULTRASOUND, ANESTHESIA TRANSESOPHAGEAL ECHOCARDIOGRAM    Post Op day #:1    Subjective (Patient focus/Primary Problem for shift): Improving comfort          Pain Goal 0 Pain Rating 1           Pain Medication/ Regime effective to reduce patient pain Yes    Objective (Physical assessment):           Rhythm: normal sinus rhythm            Bowel Activity: no          Bowel Medications: yes            Incision: healing well          Incentive Spirometry Q 1-2 hour when awake:  yes Volume: 1100          Epicardial Pacing Wires:  yes            Patient Activity:           Up to chair for meals: yes          Ambulation with RN x2 (Not including CR): yes            Is patient in home clothes:no             Chest Tubes   Pleural: yes Draining: yes               Suction: yes              Mediastinal: yes Draining: yes               Suction: yes   Dressing Change Daily:yes                      Urinary Catheter: yes           Preventative WOC consult (need MD order): no       Assessment (Nursing primary shift focus): Pt arrived from ICU around 1500 hours in stable condition. A/O x 4. Pressures good & pt saturating well on RA when alert - does desaturate to upper 80s when asleep. Tele shows NSR. Chest tubes in place to suction & draining well. Pt endorsed some nausea & back pain which improved following PRN meds.    Jim Horne RN   10/26/2024   7:48 PM

## 2024-10-27 NOTE — PLAN OF CARE
sc  Patient Name: Holly Beal   MRN: [id number]   Date of Admission: 10/23/2024    Procedure: Procedure(s):  CORONARY ARTERY BYPASS GRAFT TIMES FIVE, LEFT INTERNAL MAMMARY ARTERY HARVEST, LEFT RADIAL ARTERY HARVEST, LEFT AND RIGHT LEG ENDOSCOPIC VESSEL PROCUREMENT, EPIAORTIC ULTRASOUND, ANESTHESIA TRANSESOPHAGEAL ECHOCARDIOGRAM    Post Op day #:2    Subjective (Patient focus/Primary Problem for shift): Mobility          Pain Goal 0/10 Pain Rating 1/10           Pain Medication/ Regime effective to reduce patient pain scheduled tylenol    Objective (Physical assessment):           Rhythm: normal sinus rhythm            Bowel Activity: no if Yes indicate when: pre-op          Bowel Medications: yes            Incision: healing well          Incentive Spirometry Q 1-2 hour when awake:  yes Volume: 750          Epicardial Pacing Wires:  no            Patient Activity:           Up to chair for meals: yes          Ambulation with RN x2 (Not including CR): no            Is patient in home clothes:no             Chest Tubes   Pleural: no Draining: not applicable               Suction: not applicable              Mediastinal: no Draining: not applicable               Suction: not applicable   Dressing Change Daily:yes If No, why?n/a                     Urinary Catheter: no           Preventative WOC consult (need MD order): no       Assessment (Nursing primary shift focus): Pt reports being tired today. Requested rest in bed between meals and walks. Pt walked with CR x2. Gait belt and heart pillow. CT and PW out today. Bo[place]quez out today. Pt able to void without issue. UA sent to lab. Pt reports minimal pain; treated with scheduled tylenol. Denies SOB.     K, Mg, Ph, and iCa protocol; Ph replaced; recheck all in AM.     Plan (Patient Care Plan/focus): First shower tomorrow 10/28; BM promotion      Lilly Kumari RN   10/27/2024   2:46 PM

## 2024-10-28 ENCOUNTER — APPOINTMENT (OUTPATIENT)
Dept: OCCUPATIONAL THERAPY | Facility: HOSPITAL | Age: 69
DRG: 234 | End: 2024-10-28
Attending: STUDENT IN AN ORGANIZED HEALTH CARE EDUCATION/TRAINING PROGRAM
Payer: COMMERCIAL

## 2024-10-28 LAB
ANION GAP SERPL CALCULATED.3IONS-SCNC: 6 MMOL/L (ref 7–15)
BACTERIA UR CULT: NORMAL
BUN SERPL-MCNC: 19.3 MG/DL (ref 8–23)
CA-I BLD-MCNC: 4.4 MG/DL (ref 4.4–5.2)
CALCIUM SERPL-MCNC: 8.6 MG/DL (ref 8.8–10.4)
CHLORIDE SERPL-SCNC: 99 MMOL/L (ref 98–107)
CREAT SERPL-MCNC: 0.71 MG/DL (ref 0.51–0.95)
EGFRCR SERPLBLD CKD-EPI 2021: >90 ML/MIN/1.73M2
ERYTHROCYTE [DISTWIDTH] IN BLOOD BY AUTOMATED COUNT: 12.1 % (ref 10–15)
GLUCOSE BLDC GLUCOMTR-MCNC: 166 MG/DL (ref 70–99)
GLUCOSE BLDC GLUCOMTR-MCNC: 211 MG/DL (ref 70–99)
GLUCOSE BLDC GLUCOMTR-MCNC: 247 MG/DL (ref 70–99)
GLUCOSE BLDC GLUCOMTR-MCNC: 288 MG/DL (ref 70–99)
GLUCOSE SERPL-MCNC: 158 MG/DL (ref 70–99)
HCO3 SERPL-SCNC: 35 MMOL/L (ref 22–29)
HCT VFR BLD AUTO: 28.7 % (ref 35–47)
HGB BLD-MCNC: 9.4 G/DL (ref 11.7–15.7)
MAGNESIUM SERPL-MCNC: 2.3 MG/DL (ref 1.7–2.3)
MCH RBC QN AUTO: 29.6 PG (ref 26.5–33)
MCHC RBC AUTO-ENTMCNC: 32.8 G/DL (ref 31.5–36.5)
MCV RBC AUTO: 90 FL (ref 78–100)
PHOSPHATE SERPL-MCNC: 1.1 MG/DL (ref 2.5–4.5)
PHOSPHATE SERPL-MCNC: 1.5 MG/DL (ref 2.5–4.5)
PLATELET # BLD AUTO: 186 10E3/UL (ref 150–450)
POTASSIUM SERPL-SCNC: 3.9 MMOL/L (ref 3.4–5.3)
RBC # BLD AUTO: 3.18 10E6/UL (ref 3.8–5.2)
SODIUM SERPL-SCNC: 140 MMOL/L (ref 135–145)
WBC # BLD AUTO: 14.5 10E3/UL (ref 4–11)

## 2024-10-28 PROCEDURE — 80048 BASIC METABOLIC PNL TOTAL CA: CPT | Performed by: PHYSICIAN ASSISTANT

## 2024-10-28 PROCEDURE — 36415 COLL VENOUS BLD VENIPUNCTURE: CPT | Performed by: PHYSICIAN ASSISTANT

## 2024-10-28 PROCEDURE — 258N000003 HC RX IP 258 OP 636: Performed by: HOSPITALIST

## 2024-10-28 PROCEDURE — 250N000013 HC RX MED GY IP 250 OP 250 PS 637: Performed by: PHYSICIAN ASSISTANT

## 2024-10-28 PROCEDURE — 84100 ASSAY OF PHOSPHORUS: CPT | Performed by: PHYSICIAN ASSISTANT

## 2024-10-28 PROCEDURE — 82330 ASSAY OF CALCIUM: CPT | Performed by: PHYSICIAN ASSISTANT

## 2024-10-28 PROCEDURE — 250N000013 HC RX MED GY IP 250 OP 250 PS 637: Performed by: HOSPITALIST

## 2024-10-28 PROCEDURE — 250N000011 HC RX IP 250 OP 636: Performed by: PHYSICIAN ASSISTANT

## 2024-10-28 PROCEDURE — 250N000009 HC RX 250: Performed by: HOSPITALIST

## 2024-10-28 PROCEDURE — 210N000001 HC R&B IMCU HEART CARE

## 2024-10-28 PROCEDURE — 250N000012 HC RX MED GY IP 250 OP 636 PS 637: Performed by: PHYSICIAN ASSISTANT

## 2024-10-28 PROCEDURE — 84100 ASSAY OF PHOSPHORUS: CPT | Performed by: HOSPITALIST

## 2024-10-28 PROCEDURE — 97110 THERAPEUTIC EXERCISES: CPT | Mod: GO

## 2024-10-28 PROCEDURE — 85018 HEMOGLOBIN: CPT | Performed by: PHYSICIAN ASSISTANT

## 2024-10-28 PROCEDURE — 99232 SBSQ HOSP IP/OBS MODERATE 35: CPT | Performed by: HOSPITALIST

## 2024-10-28 PROCEDURE — 83735 ASSAY OF MAGNESIUM: CPT | Performed by: PHYSICIAN ASSISTANT

## 2024-10-28 RX ORDER — NITROFURANTOIN 25; 75 MG/1; MG/1
100 CAPSULE ORAL EVERY 12 HOURS SCHEDULED
Status: DISCONTINUED | OUTPATIENT
Start: 2024-10-28 | End: 2024-10-31 | Stop reason: HOSPADM

## 2024-10-28 RX ORDER — METFORMIN HYDROCHLORIDE 500 MG/1
1000 TABLET, EXTENDED RELEASE ORAL
Status: DISCONTINUED | OUTPATIENT
Start: 2024-10-28 | End: 2024-10-29

## 2024-10-28 RX ADMIN — METOPROLOL TARTRATE 12.5 MG: 25 TABLET, FILM COATED ORAL at 17:36

## 2024-10-28 RX ADMIN — ASPIRIN 81 MG CHEWABLE TABLET 162 MG: 81 TABLET CHEWABLE at 08:37

## 2024-10-28 RX ADMIN — POTASSIUM & SODIUM PHOSPHATES POWDER PACK 280-160-250 MG 2 PACKET: 280-160-250 PACK at 08:34

## 2024-10-28 RX ADMIN — POTASSIUM & SODIUM PHOSPHATES POWDER PACK 280-160-250 MG 2 PACKET: 280-160-250 PACK at 06:50

## 2024-10-28 RX ADMIN — ONDANSETRON 4 MG: 2 INJECTION INTRAMUSCULAR; INTRAVENOUS at 08:30

## 2024-10-28 RX ADMIN — INSULIN ASPART 2 UNITS: 100 INJECTION, SOLUTION INTRAVENOUS; SUBCUTANEOUS at 08:47

## 2024-10-28 RX ADMIN — AMLODIPINE BESYLATE 2.5 MG: 2.5 TABLET ORAL at 08:38

## 2024-10-28 RX ADMIN — METOPROLOL TARTRATE 12.5 MG: 25 TABLET, FILM COATED ORAL at 12:29

## 2024-10-28 RX ADMIN — OMEPRAZOLE 20 MG: 20 TABLET, DELAYED RELEASE ORAL at 20:49

## 2024-10-28 RX ADMIN — FUROSEMIDE 40 MG: 10 INJECTION, SOLUTION INTRAVENOUS at 17:39

## 2024-10-28 RX ADMIN — NITROFURANTOIN MONOHYDRATE/MACROCRYSTALS 100 MG: 75; 25 CAPSULE ORAL at 11:30

## 2024-10-28 RX ADMIN — ACETAMINOPHEN 975 MG: 325 TABLET ORAL at 14:57

## 2024-10-28 RX ADMIN — INSULIN ASPART 3 UNITS: 100 INJECTION, SOLUTION INTRAVENOUS; SUBCUTANEOUS at 12:30

## 2024-10-28 RX ADMIN — ACETAMINOPHEN 975 MG: 325 TABLET ORAL at 20:47

## 2024-10-28 RX ADMIN — ROSUVASTATIN CALCIUM 40 MG: 40 TABLET, FILM COATED ORAL at 20:49

## 2024-10-28 RX ADMIN — METFORMIN ER 500 MG 1000 MG: 500 TABLET ORAL at 17:36

## 2024-10-28 RX ADMIN — FUROSEMIDE 40 MG: 10 INJECTION, SOLUTION INTRAVENOUS at 12:30

## 2024-10-28 RX ADMIN — FUROSEMIDE 40 MG: 10 INJECTION, SOLUTION INTRAVENOUS at 06:50

## 2024-10-28 RX ADMIN — HEPARIN SODIUM 5000 UNITS: 10000 INJECTION, SOLUTION INTRAVENOUS; SUBCUTANEOUS at 04:06

## 2024-10-28 RX ADMIN — HEPARIN SODIUM 5000 UNITS: 10000 INJECTION, SOLUTION INTRAVENOUS; SUBCUTANEOUS at 20:49

## 2024-10-28 RX ADMIN — HEPARIN SODIUM 5000 UNITS: 10000 INJECTION, SOLUTION INTRAVENOUS; SUBCUTANEOUS at 12:30

## 2024-10-28 RX ADMIN — METOPROLOL TARTRATE 37.5 MG: 25 TABLET, FILM COATED ORAL at 08:38

## 2024-10-28 RX ADMIN — LIDOCAINE 1 PATCH: 4 PATCH TOPICAL at 22:06

## 2024-10-28 RX ADMIN — INSULIN GLARGINE 10 UNITS: 100 INJECTION, SOLUTION SUBCUTANEOUS at 08:55

## 2024-10-28 RX ADMIN — ONDANSETRON 4 MG: 2 INJECTION INTRAMUSCULAR; INTRAVENOUS at 17:37

## 2024-10-28 RX ADMIN — METOPROLOL TARTRATE 12.5 MG: 25 TABLET, FILM COATED ORAL at 04:05

## 2024-10-28 RX ADMIN — NITROFURANTOIN MONOHYDRATE/MACROCRYSTALS 100 MG: 75; 25 CAPSULE ORAL at 20:47

## 2024-10-28 RX ADMIN — INSULIN ASPART 1 UNITS: 100 INJECTION, SOLUTION INTRAVENOUS; SUBCUTANEOUS at 18:40

## 2024-10-28 RX ADMIN — CYANOCOBALAMIN TAB 1000 MCG 1000 MCG: 1000 TAB at 08:38

## 2024-10-28 RX ADMIN — ACETAMINOPHEN 975 MG: 325 TABLET ORAL at 08:37

## 2024-10-28 RX ADMIN — POTASSIUM & SODIUM PHOSPHATES POWDER PACK 280-160-250 MG 2 PACKET: 280-160-250 PACK at 12:30

## 2024-10-28 RX ADMIN — SODIUM PHOSPHATE, MONOBASIC, MONOHYDRATE AND SODIUM PHOSPHATE, DIBASIC, ANHYDROUS 15 MMOL: 142; 276 INJECTION, SOLUTION INTRAVENOUS at 22:05

## 2024-10-28 NOTE — PROGRESS NOTES
"CVTS Daily Progress Note   POD#3 s/p CABGx5  Attending: Gina  LOS: 5    SUBJECTIVE/INTERVAL EVENTS:    No acute events overnight. Normotensive. NSR. Patient progressing well. Maintaining oxygen saturations on room air. In bed this AM as she is tired and somewhat nauseas; no abdominal pain. Pain well controlled. +BM. Limited appetite. UOP adequate. Chest tubes removed yesterday. Patient denies new chest pain, shortness of breath, abdominal pain, calf pain. Patient has no questions today.     OBJECTIVE:  Temp:  [98.2  F (36.8  C)-99  F (37.2  C)] 99  F (37.2  C)  Pulse:  [] 94  Resp:  [18-22] 22  BP: (105-151)/(53-69) 126/59  SpO2:  [90 %-97 %] 90 %  Vitals:    10/24/24 0248 10/25/24 0403 10/26/24 0630 10/27/24 0631   Weight: 70.8 kg (156 lb 1.6 oz) 70.6 kg (155 lb 9.6 oz) 75.6 kg (166 lb 9.6 oz) 72.8 kg (160 lb 6.4 oz)    10/28/24 0404   Weight: 72 kg (158 lb 12.8 oz)       Clinically Significant Risk Factors                              # DMII: A1C = 7.1 % (Ref range: <5.7 %) within past 6 months   # Overweight: Estimated body mass index is 25.63 kg/m  as calculated from the following:    Height as of this encounter: 1.676 m (5' 6\").    Weight as of this encounter: 72 kg (158 lb 12.8 oz).      # Financial/Environmental Concerns: none  # Asthma: noted on problem list                 Current Medications:    Scheduled Meds:  Current Facility-Administered Medications   Medication Dose Route Frequency Provider Last Rate Last Admin    acetaminophen (TYLENOL) tablet 975 mg  975 mg Oral TID Esperanza Wilson MD   975 mg at 10/28/24 0837    Or    acetaminophen (TYLENOL) Suppository 650 mg  650 mg Rectal TID Esperanza Wilson MD        amLODIPine (NORVASC) tablet 2.5 mg  2.5 mg Oral Daily Vanesa Oreilly PA-C   2.5 mg at 10/28/24 0838    aspirin (ASA) chewable tablet 162 mg  162 mg Oral or NG Tube Daily Vanesa Oreilly PA-C   162 mg at 10/28/24 0837    Or    aspirin (ASA) Suppository 300 mg  300 mg Rectal " Daily Vanesa Oreilly PA-C        cyanocobalamin (VITAMIN B-12) tablet 1,000 mcg  1,000 mcg Oral Daily Vanesa Oreilly PA-C   1,000 mcg at 10/28/24 0838    furosemide (LASIX) injection 40 mg  40 mg Intravenous TID Vanesa Oreilly PA-C   40 mg at 10/28/24 0650    heparin ANTICOAGULANT injection 5,000 Units  5,000 Units Subcutaneous Q8H Vanesa Oreilly PA-C   5,000 Units at 10/28/24 0406    insulin aspart (NovoLOG) injection (RAPID ACTING)  1-7 Units Subcutaneous TID AC Vanesa Oreilly PA-C   2 Units at 10/28/24 0847    insulin aspart (NovoLOG) injection (RAPID ACTING)  1-5 Units Subcutaneous At Bedtime Vanesa Oreilly PA-C   2 Units at 10/27/24 2131    Lidocaine (LIDOCARE) 4 % Patch 1-2 patch  1-2 patch Transdermal Q24H Vanesa Oreilly PA-C   2 patch at 10/27/24 1714    metFORMIN (GLUCOPHAGE XR) 24 hr tablet 1,000 mg  1,000 mg Oral Daily with supper Vanesa Oreilly PA-C        metoprolol tartrate (LOPRESSOR) half-tab 12.5 mg  12.5 mg Oral TID Vanesa Oreilly PA-C   12.5 mg at 10/28/24 0405    metoprolol tartrate (LOPRESSOR) half-tab 37.5 mg  37.5 mg Oral BID Vanesa Oreilly PA-C   37.5 mg at 10/28/24 0838    omeprazole (PriLOSEC OTC) EC tablet 20 mg  20 mg Oral QPM Esperanza Wilson MD   20 mg at 10/27/24 2051    polyethylene glycol (MIRALAX) Packet 17 g  17 g Oral Daily Vanesa Oreilly PA-C   17 g at 10/27/24 0945    potassium & sodium phosphates (NEUTRA-PHOS) Packet 2 packet  2 packet Oral or Feeding Tube Q4H Vanesa Oreilly PA-C   2 packet at 10/28/24 0834    rosuvastatin (CRESTOR) tablet 40 mg  40 mg Oral Daily Vanesa Oreilly PA-C   40 mg at 10/27/24 2039    senna-docusate (SENOKOT-S/PERICOLACE) 8.6-50 MG per tablet 1 tablet  1 tablet Oral BID Vanesa Oreilly PA-C   1 tablet at 10/27/24 2040     Continuous Infusions:  Current Facility-Administered Medications   Medication Dose Route Frequency Provider  Last Rate Last Admin     PRN Meds:.  Current Facility-Administered Medications   Medication Dose Route Frequency Provider Last Rate Last Admin    albuterol (PROVENTIL HFA/VENTOLIN HFA) inhaler  2 puff Inhalation Q6H PRN Russell Cheng MD        bisacodyl (DULCOLAX) suppository 10 mg  10 mg Rectal Daily PRN Vanesa Oreilly PA-C        calcium gluconate 1 g in 50 mL in sodium chloride intermittent infusion  1 g Intravenous Once PRN Vanesa Oreilly PA-C   1 g at 10/25/24 1353    calcium gluconate 2 g in  mL intermittent infusion  2 g Intravenous Once PRN Vanesa Oreilly PA-C        calcium gluconate 3 g in sodium chloride 0.9 % 100 mL intermittent infusion  3 g Intravenous Once PRN Vanesa Oreilly PA-C        glucose gel 15-30 g  15-30 g Oral Q15 Min PRN Vanesa Oreilly PA-C        Or    dextrose 50 % injection 25-50 mL  25-50 mL Intravenous Q15 Min PRN Vanesa Oreilly PA-C        Or    glucagon injection 1 mg  1 mg Subcutaneous Q15 Min PRN Vanesa Oreilly PA-C        hydrALAZINE (APRESOLINE) injection 10 mg  10 mg Intravenous Q30 Min PRN Vanesa Oreilly PA-C        hydrOXYzine HCl (ATARAX) tablet 25 mg  25 mg Oral Q6H PRN Vanesa Oreilly PA-C   25 mg at 10/27/24 2043    Or    hydrOXYzine HCl (ATARAX) tablet 50 mg  50 mg Oral Q6H PRN Vanesa Oreilly PA-C        ketorolac (TORADOL) injection 30 mg  30 mg Intravenous Q6H PRN Vanesa Oreilly PA-C        lactated ringers BOLUS 250 mL  250 mL Intravenous Q15 Min PRN Vanesa Oreilly PA-C        magnesium hydroxide (MILK OF MAGNESIA) suspension 30 mL  30 mL Oral Daily PRN Vanesa Oreilly PA-C   30 mL at 10/27/24 1631    naloxone (NARCAN) injection 0.2 mg  0.2 mg Intravenous Q2 Min PRN Vanesa Oreilly PA-C        Or    naloxone (NARCAN) injection 0.4 mg  0.4 mg Intravenous Q2 Min PRN Vanesa Oreilly PA-C        Or    naloxone (NARCAN) injection 0.2 mg   0.2 mg Intramuscular Q2 Min PRN Vanesa Oreilly PA-C        Or    naloxone (NARCAN) injection 0.4 mg  0.4 mg Intramuscular Q2 Min PRN Vanesa Oreilly PA-C        ondansetron (ZOFRAN ODT) ODT tab 4 mg  4 mg Oral Q6H PRN Vanesa Oreilly PA-C   4 mg at 10/26/24 1745    Or    ondansetron (ZOFRAN) injection 4 mg  4 mg Intravenous Q6H PRN Vanesa Oreilly PA-C   4 mg at 10/28/24 0830    oxyCODONE (ROXICODONE) tablet 5 mg  5 mg Oral Q4H PRN Vanesa Oreilly PA-C   5 mg at 10/26/24 1746    Or    oxyCODONE (ROXICODONE) tablet 10 mg  10 mg Oral Q4H PRN Vanesa Oreilly PA-C        prochlorperazine (COMPAZINE) injection 5 mg  5 mg Intravenous Q6H PRN Vanesa Oreilly PA-C   5 mg at 10/25/24 2100    Or    prochlorperazine (COMPAZINE) tablet 5 mg  5 mg Oral Q6H PRN Vanesa Oreilly PA-C           Cardiographics:    Telemetry monitoring demonstrates NSR with rates in the 90s per my personal review.    Imaging:  Results for orders placed or performed during the hospital encounter of 10/23/24   US Carotid Bilateral    Impression    IMPRESSION:  1.  Mild plaque formation, velocities consistent with less than 50% stenosis in the right internal carotid artery.  2.  Mild plaque formation, velocities consistent with less than 50% stenosis in the left internal carotid artery.  3.  Flow within the vertebral arteries is antegrade.   XR Chest Port 1 View    Impression    IMPRESSION: Endotracheal tube tip terminates in the mid thoracic trachea approximately 4 cm from the venice. Right IJ introducer tip terminates in the lower SVC. Mediastinal and left pleural drains are present. Epicardial pacing leads. No unexpected   radiopaque foreign bodies.    Left basilar atelectasis. No significant pleural effusion or pneumothorax. Nonenlarged heart with postoperative changes from CABG. Mitral annular calcifications. Prior lumbar vertebroplasties.   XR Chest Port 1 View    Impression     IMPRESSION:   *  Ill-defined hyperdensities overlie the midline upper abdomen and midline lower chest (arrows) and favored to be extrinsic to the patient, but mimics the appearance of a retrocardiac opacity. Recommend repeating portable chest radiograph after removal   of all overlying objects.  *  Left-sided chest tube with no pneumothorax. Possible trace left pleural effusion.  *  Mild cardiomegaly with CABG.  *  Right subclavian central catheter tip in the RA/SVC junction.       Labs, personally reviewed.  Hemoglobin   Date Value Ref Range Status   10/28/2024 9.4 (L) 11.7 - 15.7 g/dL Final   10/27/2024 9.3 (L) 11.7 - 15.7 g/dL Final   10/26/2024 8.9 (L) 11.7 - 15.7 g/dL Final     WBC Count   Date Value Ref Range Status   10/28/2024 14.5 (H) 4.0 - 11.0 10e3/uL Final   10/27/2024 17.9 (H) 4.0 - 11.0 10e3/uL Final   10/26/2024 15.0 (H) 4.0 - 11.0 10e3/uL Final     Platelet Count   Date Value Ref Range Status   10/28/2024 186 150 - 450 10e3/uL Final   10/27/2024 197 150 - 450 10e3/uL Final   10/26/2024 202 150 - 450 10e3/uL Final     Creatinine   Date Value Ref Range Status   10/28/2024 0.71 0.51 - 0.95 mg/dL Final   10/27/2024 0.87 0.51 - 0.95 mg/dL Final   10/25/2024 0.75 0.51 - 0.95 mg/dL Final     Potassium   Date Value Ref Range Status   10/28/2024 3.9 3.4 - 5.3 mmol/L Final   10/27/2024 3.9 3.4 - 5.3 mmol/L Final   10/26/2024 4.0 3.4 - 5.3 mmol/L Final   10/29/2021 4.3 3.5 - 5.0 mmol/L Final   10/20/2020 4.1 3.5 - 5.0 mmol/L Final   04/23/2019 5.2 (H) 3.5 - 5.0 mmol/L Final     Potassium POCT   Date Value Ref Range Status   10/25/2024 4.2 3.4 - 5.3 mmol/L Final   10/25/2024 4.6 3.4 - 5.3 mmol/L Final   10/25/2024 5.7 (H) 3.4 - 5.3 mmol/L Final     Magnesium   Date Value Ref Range Status   10/28/2024 2.3 1.7 - 2.3 mg/dL Final   10/27/2024 2.3 1.7 - 2.3 mg/dL Final   10/26/2024 2.6 (H) 1.7 - 2.3 mg/dL Final          I/O:  I/O last 3 completed shifts:  In: 880 [P.O.:880]  Out: 1950 [Urine:1950]       Physical  Exam:    General: Patient seen in bed. NAD. Conversant. Pleasant  CV: RRR on monitor. 2+ peripheral pulses in all extremities. Mild edema. Incision C/D/I.  Pulm: Non-labored effort on room air.   Abd: Soft, NT, ND  Ext: Mild pedal edema, SCDs in place, warm, distal pulses intact  Neuro: CNs grossly intact      ASSESSMENT/PLAN:    Holly Beal is a 69 year old female with a history of CAD who is s/p CABGx5.    Principal Problem:    Coronary artery disease due to lipid rich plaque  Active Problems:    Calcium kidney stone    Kidney stone on left side    Asthma    Abnormal stress test    Status post coronary angiogram    Chest pain    Accelerating angina (H)    Mild intermittent asthma without complication    Diabetes mellitus type II, non insulin dependent (H)    Therapeutic opioid induced constipation    Physical deconditioning    Gastroesophageal reflux disease without esophagitis        NEURO:   - Scheduled Tylenol/lidocaine patches and PRN Tylenol/oxycodone/Toradol for pain  - PRN Atarax    CV:   - Pre-op EF 60-65%  - Normotensive  - Metoprolol 25mg BID increased to 37.5mg BID  - Amlodipine 2.5mg daily (radial artery graft protection)  - ASA 162mg daily  - Rosuvastatin 40mg daily  - Chest tubes and TPW removed 10/27    PULM:   - Extubated on POD0  - Maintaining oxygen saturations on room air  - Encourage pulmonary toilet    FEN/GI:  - Continue electrolyte replacement protocol  - Cardiac; ADAT  - Bowel regimen  - PTA Vit B12    RENAL:  - Adequate UOP/hr. Continue to monitor closely.  - Cr 0.71  - Diuresis with 40mg IV Lasix TID    HEME:  - Acute blood loss anemia post-op.   - No bleeding concerns. Hep SQ, ASA    ID:  - [place] op ppx complete, afebrile No concerns for infection    ENDO:   - SSI  - Restarted PTA Metformin--dose increased today    PPx:   - DVT: SCDs, SQ heparin TID, ambulation   - GI: Protonix 40mg IV/PO daily    DISPO:   - General telemetry status  - Medically Ready for Discharge: Anticipated in 2-4  Days           Patient discussed with Dr. Cronin.        _______  ISMAEL BourgeoisC  Cardiothoracic Surgery  [phone]

## 2024-10-28 NOTE — PLAN OF CARE
Goal Outcome Evaluation:  sc  Patient Name: Holly Beal   MRN: [id number]   Date of Admission: 10/23/2024    Procedure: Procedure(s):  CORONARY ARTERY BYPASS GRAFT TIMES FIVE, LEFT INTERNAL MAMMARY ARTERY HARVEST, LEFT RADIAL ARTERY HARVEST, LEFT AND RIGHT LEG ENDOSCOPIC VESSEL PROCUREMENT, EPIAORTIC ULTRASOUND, ANESTHESIA TRANSESOPHAGEAL ECHOCARDIOGRAM    Post Op day #:2    Subjective (Patient focus/Primary Problem for shift): mobility          Pain Goal 0 Pain Rating 0           Pain Medication/ Regime effective to reduce patient pain apap lidocaine patches    Objective (Physical assessment):           Rhythm: normal sinus rhythm            Bowel Activity: no if Yes indicate when:            Bowel Medications: yes  Milk of mag and senna +             Incision: healing well          Incentive Spirometry Q 1-2 hour when awake:  yes Volume: 750          Epicardial Pacing Wires:  no            Patient Activity:           Up to chair for meals: no          Ambulation with RN x2 (Not including CR): yes            Is patient in home clothes:no             Chest Tubes   Pleural: no Draining: no               Suction: no              Mediastinal: no Draining: no               Suction: no   Dressing Change Daily:yes If No, why?                      Urinary Catheter: no           Preventative WOC consult (need MD order): no       Assessment (Nursing primary shift focus): Pt walked down hallway and back with assist of 1 and walker.  Pt was slightly unsteady. Pt denies dizziness.  Pt returned to bed and needed 1L NC.  Pt reports pain 5/10 after walk which was relieved with apap.     Plan (Patient Care Plan/focus): Pt was encouraged to take pain medication before walking   Pt has some anxiety this evening that was resolved with prn hydroxyzine.       Fahad Vega RN   10/27/2024   10:14 PM

## 2024-10-28 NOTE — PLAN OF CARE
Patient Name: Holly Beal   MRN: [id number]   Date of Admission: 10/23/2024     Procedure: Procedure(s):  CORONARY ARTERY BYPASS GRAFT TIMES FIVE, LEFT INTERNAL MAMMARY ARTERY HARVEST, LEFT RADIAL ARTERY HARVEST, LEFT AND RIGHT LEG ENDOSCOPIC VESSEL PROCUREMENT, EPIAORTIC ULTRASOUND, ANESTHESIA TRANSESOPHAGEAL ECHOCARDIOGRAM     Post Op day #:2     Subjective (Patient focus/Primary Problem for shift): Rest and sleep          Pain Goal 0/10 Pain Rating 1/10           Pain Medication/ Regime effective to reduce patient pain Pt declined any pain medication this shift     Objective (Physical assessment):           Rhythm: SR with BBB             Bowel Activity: yes if Yes indicate when: 10/27/24          Bowel Medications: yes             Incision: healing well          Incentive Spirometry Q 1-2 hour when awake:  yes Volume: 750          Epicardial Pacing Wires:  no             Patient Activity:           Up to chair for meals: yes          Ambulation with RN x2 (Not including CR): no            Is patient in home clothes:no              Chest Tubes              Pleural: no Draining: not applicable               Suction: not applicable              Mediastinal: no Draining: not applicable               Suction: not applicable              Dressing Change Daily: no If No, why? 10/28/24                     Urinary Catheter: no            Preventative WOC consult (need MD order): no         Assessment (Nursing primary shift focus): Pt  had bowel movement this shift x1. Pt voiding without difficulties, denied any need for PRN pain medication. No acute issues overnight. Vitals stable.     Plan (Patient Care Plan/focus): Continue ambulation, IS and flutter valve. Encourage to eat since poor appetite and Phosphorus level is low. Phosphorus PO replacement today.         Angus Macdonald RN  10/28/24 0588

## 2024-10-28 NOTE — PROGRESS NOTES
[facility]    Medicine Progress Note - Hospitalist Service    Date of Admission:  10/23/2024    Assessment & Plan                Holly Beal is a 69 year old female with HTN, HL, DM2, accelerating angina who had an abnormal stress test leading to a coronary angiogram on 10/23 which showed multi-vessel CAD.  Patient is now status post CABG x 5.  Doing fairly well postoperatively. Hospital Day: 6       Multi-vessel CAD  -- Coronary angiogram completed on 10/23 which showed multivessel disease  -- S/p CABG x5 on 10/25 by Dr. Fuentes  -- , Home Crestor increased to 40mg every day  -- Postop cares per CV Surgery  - Started on metoprolol  -On IV Lasix  -- Chest tubes and Bojorquez catheter out today.  She seems a bit deconditioned but she is hoping to be able to go home rather than TCU  -more fatigued today ?due to poor sleep. Monitor closely     Acute blood loss anemia  -- Monitor CBC  -Hemoglobin laure 8.9, has not needed transfusion     Leukocytosis  -- Likely reactive. Monitor cbc and temp curve     Essential HTN  -- Metoprolol, Norvasc  -- Hydralazine iv prn per CV surgery     DM 2  -- home Metformin  -Started on Lantus 10 units daily due to poor BG control here  -- Accu-checks, medium sliding scale, hypoglycemia protocol  -- A1c: 7.1%     HLD  -- Continue Increased Crestor at 40mg every day with goal of LDL<70     Mild intermittent asthma w/o acute exacerbation  -- Albuterol inhaler prn     GERD  -- PTA Omeprazole     Anxiety d/o  -- PTA Alprazolam. Prn Atarax    Constipation  -resolved, stooling well now          Diet: Low Saturated Fat Na <2400 mg    DVT Prophylaxis: Moderate risk.   Heparin SQ  Bojorquez Catheter: Not present  Lines: None     Cardiac Monitoring: ACTIVE order. Indication: Open heart surgery (72 hours)  Code Status: Full Code      Clinically Significant Risk Factors                              # DMII: A1C = 7.1 % (Ref range: <5.7 %) within past 6 months   # Overweight:  "Estimated body mass index is 25.63 kg/m  as calculated from the following:    Height as of this encounter: 1.676 m (5' 6\").    Weight as of this encounter: 72 kg (158 lb 12.8 oz).        # Financial/Environmental Concerns: none  # Asthma: noted on problem list  # History of CABG: noted on surgical history       Disposition Plan     Medically Ready for Discharge: Anticipated in 2-4 Days         Discharge barrier(s): Deconditioning, postop recovery  Care discussed with: Patient, , RN      Esperanza Wilson MD  Hospitalist Service  [facility]  Securely message with [employer] (more info)  Text page via [employer] Paging/Directory   ______________________________________________________________________      Physical Exam   Vital Signs: Temp: 98.3  F (36.8  C) Temp src: Oral BP: 105/57 Pulse: 79   Resp: 20 SpO2: 94 % O2 Device: None (Room air) Oxygen Delivery: 1 LPM  Weight: 158 lbs 12.8 oz    General: in no apparent distress, non-toxic, and alert female lying in hospital bed oriented x3  HEENT: Head normocephalic atraumatic, oral mucosa moist. Sclerae anicteric  Skin: No rashes or lesions  Extremities: No peripheral edema  Psych: Normal affect, mood euthymic  Neuro: Grossly normal      Medical Decision Making               Data   Recent Results (from the past 16 hours)   Ionized Calcium    Collection Time: 10/28/24  4:57 AM   Result Value Ref Range    Calcium Ionized Whole Blood 4.4 4.4 - 5.2 mg/dL   Basic metabolic panel    Collection Time: 10/28/24  4:57 AM   Result Value Ref Range    Sodium 140 135 - 145 mmol/L    Potassium 3.9 3.4 - 5.3 mmol/L    Chloride 99 98 - 107 mmol/L    Carbon Dioxide (CO2) 35 (H) 22 - 29 mmol/L    Anion Gap 6 (L) 7 - 15 mmol/L    Urea Nitrogen 19.3 8.0 - 23.0 mg/dL    Creatinine 0.71 0.51 - 0.95 mg/dL    GFR Estimate >90 >60 mL/min/1.73m2    Calcium 8.6 (L) 8.8 - 10.4 mg/dL    Glucose 158 (H) 70 - 99 mg/dL   Magnesium    Collection Time: 10/28/24  4:57 AM   Result Value " Ref Range    Magnesium 2.3 1.7 - 2.3 mg/dL   CBC with platelets    Collection Time: 10/28/24  4:57 AM   Result Value Ref Range    WBC Count 14.5 (H) 4.0 - 11.0 10e3/uL    RBC Count 3.18 (L) 3.80 - 5.20 10e6/uL    Hemoglobin 9.4 (L) 11.7 - 15.7 g/dL    Hematocrit 28.7 (L) 35.0 - 47.0 %    MCV 90 78 - 100 fL    MCH 29.6 26.5 - 33.0 pg    MCHC 32.8 31.5 - 36.5 g/dL    RDW 12.1 10.0 - 15.0 %    Platelet Count 186 150 - 450 10e3/uL   Phosphorus    Collection Time: 10/28/24  4:57 AM   Result Value Ref Range    Phosphorus 1.1 (L) 2.5 - 4.5 mg/dL   Glucose by meter    Collection Time: 10/28/24  8:06 AM   Result Value Ref Range    GLUCOSE BY METER POCT 211 (H) 70 - 99 mg/dL   Glucose by meter    Collection Time: 10/28/24 12:09 PM   Result Value Ref Range    GLUCOSE BY METER POCT 247 (H) 70 - 99 mg/dL       Interval History     Patient is up sitting at the side of bed when I visit.  She complains she is feeling very exhausted today.  She did not sleep well due to being up frequently with loose bowel movements.  She feels she is just more exhausted today compared to yesterday.  She has been up to the chair twice already this morning, she did have to return to the bed due to just being so tired.  She does not have much appetite.  Has been voiding well without catheter.  Participating well with rehab.   at bedside for visit.  No change in management from my perspective at this time.

## 2024-10-28 NOTE — PROGRESS NOTES
NUTRITION EDUCATION      REASON FOR ASSESSMENT:  Educate on heart healthy diet    NUTRITION HISTORY:  Information obtained from pt    Pt states she tries to follow a consistent CHO diet at home but states she probably doesn't follow it as well as she should    CURRENT DIET:  Low saturated fat < 2400 mg Na    NUTRITION DIAGNOSIS:  Food- and nutrition-related knowledge deficit R/t CABG as evidenced by consulted for heart healthy diet education and need for therapeutic diet    INTERVENTIONS:    Nutrition Prescription:  Low sat fat, low sodium, consistent CHO    Implementation:      *  Nutrition Education (Content):   A)  Provided handout heart healthy eating nutrition therapy and Food choice guidelines for heart healthy eating with diabetes   B)  Discussed fats to avoid, fats to include, consistent CHO, decreasing simple CHO, adequate fiber and low sodium      *  Nutrition Education (Application):   A)  Discussed current eating habits and recommended alternative food choices      *  Anticipate good compliance      *  Diet Education - refer to Education Flowsheet    Goals:      *  Patient will verbalize understanding of diet met      *  All of the above goals met during the education session    Follow Up/Monitoring:      *  Provided RD contact information for future questions      *  Recommended Out-Patient Nutrition Referral, if further diet instructions are needed

## 2024-10-28 NOTE — PLAN OF CARE
Goal Outcome Evaluation:    sc  Patient Name: Holly Beal   MRN: [id number]   Date of Admission: 10/23/2024    Procedure: Procedure(s):  CORONARY ARTERY BYPASS GRAFT TIMES FIVE, LEFT INTERNAL MAMMARY ARTERY HARVEST, LEFT AND RIGHT LEG ENDOSCOPIC VESSEL PROCUREMENT,  LEFT RADIAL ARTERY HARVEST  EPIAORTIC ULTRASOUND, ANESTHESIA TRANSESOPHAGEAL ECHOCARDIOGRAM    Post Op day #:3    Subjective (Patient focus/Primary Problem for shift): Pain and nausea management          Pain Goal0 Pain Rating0           Pain Medication/ Regime effective to reduce patient painSched tylenol PO    Objective (Physical assessment):           Rhythm: normal sinus rhythm BBB            Bowel Activity: yes if Yes indicate when: today          Bowel Medications: refused this shift            Incision: healing well          Incentive Spirometry Q 1-2 hour when awake:  yes Volume: 1000          Epicardial Pacing Wires:  no            Patient Activity:           Up to chair for meals: yes          Ambulation with RN x2 (Not including CR): yes            Is patient in home clothes:no                      Urinary Catheter: no           Preventative WOC consult (need MD order): no       Assessment (Nursing primary shift focus): Complained nausea and feeling tired today.    Plan (Patient Care Plan/focus): Ambulation, pain control and nausea management.    PRN zofran IV given x2 for nausea.    Tobias Smith RN   10/28/2024   6:29 PM

## 2024-10-29 ENCOUNTER — APPOINTMENT (OUTPATIENT)
Dept: OCCUPATIONAL THERAPY | Facility: HOSPITAL | Age: 69
DRG: 234 | End: 2024-10-29
Attending: STUDENT IN AN ORGANIZED HEALTH CARE EDUCATION/TRAINING PROGRAM
Payer: COMMERCIAL

## 2024-10-29 LAB
ANION GAP SERPL CALCULATED.3IONS-SCNC: 12 MMOL/L (ref 7–15)
BUN SERPL-MCNC: 19.4 MG/DL (ref 8–23)
CA-I BLD-MCNC: 4.2 MG/DL (ref 4.4–5.2)
CALCIUM SERPL-MCNC: 8.7 MG/DL (ref 8.8–10.4)
CHLORIDE SERPL-SCNC: 96 MMOL/L (ref 98–107)
CREAT SERPL-MCNC: 0.76 MG/DL (ref 0.51–0.95)
EGFRCR SERPLBLD CKD-EPI 2021: 85 ML/MIN/1.73M2
GLUCOSE BLDC GLUCOMTR-MCNC: 178 MG/DL (ref 70–99)
GLUCOSE BLDC GLUCOMTR-MCNC: 205 MG/DL (ref 70–99)
GLUCOSE BLDC GLUCOMTR-MCNC: 212 MG/DL (ref 70–99)
GLUCOSE BLDC GLUCOMTR-MCNC: 252 MG/DL (ref 70–99)
GLUCOSE SERPL-MCNC: 130 MG/DL (ref 70–99)
HCO3 SERPL-SCNC: 33 MMOL/L (ref 22–29)
MAGNESIUM SERPL-MCNC: 2.1 MG/DL (ref 1.7–2.3)
PHOSPHATE SERPL-MCNC: 2.3 MG/DL (ref 2.5–4.5)
PLATELET # BLD AUTO: 254 10E3/UL (ref 150–450)
POTASSIUM SERPL-SCNC: 3.4 MMOL/L (ref 3.4–5.3)
POTASSIUM SERPL-SCNC: 4 MMOL/L (ref 3.4–5.3)
SODIUM SERPL-SCNC: 141 MMOL/L (ref 135–145)
WBC # BLD AUTO: 11 10E3/UL (ref 4–11)

## 2024-10-29 PROCEDURE — 210N000001 HC R&B IMCU HEART CARE

## 2024-10-29 PROCEDURE — 97110 THERAPEUTIC EXERCISES: CPT | Mod: GO

## 2024-10-29 PROCEDURE — 82330 ASSAY OF CALCIUM: CPT | Performed by: PHYSICIAN ASSISTANT

## 2024-10-29 PROCEDURE — 36415 COLL VENOUS BLD VENIPUNCTURE: CPT | Performed by: HOSPITALIST

## 2024-10-29 PROCEDURE — 82947 ASSAY GLUCOSE BLOOD QUANT: CPT | Performed by: PHYSICIAN ASSISTANT

## 2024-10-29 PROCEDURE — 258N000003 HC RX IP 258 OP 636: Performed by: HOSPITALIST

## 2024-10-29 PROCEDURE — 99232 SBSQ HOSP IP/OBS MODERATE 35: CPT | Performed by: HOSPITALIST

## 2024-10-29 PROCEDURE — 250N000013 HC RX MED GY IP 250 OP 250 PS 637: Performed by: PHYSICIAN ASSISTANT

## 2024-10-29 PROCEDURE — 85049 AUTOMATED PLATELET COUNT: CPT | Performed by: PHYSICIAN ASSISTANT

## 2024-10-29 PROCEDURE — 84132 ASSAY OF SERUM POTASSIUM: CPT | Performed by: HOSPITALIST

## 2024-10-29 PROCEDURE — 80048 BASIC METABOLIC PNL TOTAL CA: CPT | Performed by: PHYSICIAN ASSISTANT

## 2024-10-29 PROCEDURE — 83735 ASSAY OF MAGNESIUM: CPT | Performed by: PHYSICIAN ASSISTANT

## 2024-10-29 PROCEDURE — 250N000013 HC RX MED GY IP 250 OP 250 PS 637: Performed by: STUDENT IN AN ORGANIZED HEALTH CARE EDUCATION/TRAINING PROGRAM

## 2024-10-29 PROCEDURE — 250N000013 HC RX MED GY IP 250 OP 250 PS 637: Performed by: HOSPITALIST

## 2024-10-29 PROCEDURE — 36415 COLL VENOUS BLD VENIPUNCTURE: CPT | Performed by: PHYSICIAN ASSISTANT

## 2024-10-29 PROCEDURE — 85048 AUTOMATED LEUKOCYTE COUNT: CPT | Performed by: PHYSICIAN ASSISTANT

## 2024-10-29 PROCEDURE — 250N000009 HC RX 250: Performed by: HOSPITALIST

## 2024-10-29 PROCEDURE — 84100 ASSAY OF PHOSPHORUS: CPT | Performed by: STUDENT IN AN ORGANIZED HEALTH CARE EDUCATION/TRAINING PROGRAM

## 2024-10-29 PROCEDURE — 250N000011 HC RX IP 250 OP 636: Mod: JZ | Performed by: PHYSICIAN ASSISTANT

## 2024-10-29 RX ORDER — METOPROLOL TARTRATE 25 MG/1
50 TABLET, FILM COATED ORAL 2 TIMES DAILY
Status: DISCONTINUED | OUTPATIENT
Start: 2024-10-29 | End: 2024-10-30

## 2024-10-29 RX ORDER — POTASSIUM CHLORIDE 1500 MG/1
40 TABLET, EXTENDED RELEASE ORAL ONCE
Status: COMPLETED | OUTPATIENT
Start: 2024-10-29 | End: 2024-10-29

## 2024-10-29 RX ORDER — FUROSEMIDE 10 MG/ML
40 INJECTION INTRAMUSCULAR; INTRAVENOUS DAILY
Status: DISCONTINUED | OUTPATIENT
Start: 2024-10-30 | End: 2024-10-31 | Stop reason: HOSPADM

## 2024-10-29 RX ORDER — METFORMIN HYDROCHLORIDE 500 MG/1
1500 TABLET, EXTENDED RELEASE ORAL
Status: DISCONTINUED | OUTPATIENT
Start: 2024-10-29 | End: 2024-10-31 | Stop reason: HOSPADM

## 2024-10-29 RX ADMIN — CYANOCOBALAMIN TAB 1000 MCG 1000 MCG: 1000 TAB at 08:46

## 2024-10-29 RX ADMIN — ONDANSETRON 4 MG: 2 INJECTION INTRAMUSCULAR; INTRAVENOUS at 06:51

## 2024-10-29 RX ADMIN — ACETAMINOPHEN 975 MG: 325 TABLET ORAL at 13:18

## 2024-10-29 RX ADMIN — METOPROLOL TARTRATE 12.5 MG: 25 TABLET, FILM COATED ORAL at 04:09

## 2024-10-29 RX ADMIN — AMLODIPINE BESYLATE 2.5 MG: 2.5 TABLET ORAL at 08:46

## 2024-10-29 RX ADMIN — HYDROXYZINE HYDROCHLORIDE 50 MG: 50 TABLET, FILM COATED ORAL at 00:51

## 2024-10-29 RX ADMIN — ROSUVASTATIN CALCIUM 40 MG: 40 TABLET, FILM COATED ORAL at 21:03

## 2024-10-29 RX ADMIN — METOPROLOL TARTRATE 12.5 MG: 25 TABLET, FILM COATED ORAL at 18:25

## 2024-10-29 RX ADMIN — INSULIN ASPART 2 UNITS: 100 INJECTION, SOLUTION INTRAVENOUS; SUBCUTANEOUS at 08:49

## 2024-10-29 RX ADMIN — POTASSIUM PHOSPHATE, MONOBASIC POTASSIUM PHOSPHATE, DIBASIC 9 MMOL: 224; 236 INJECTION, SOLUTION, CONCENTRATE INTRAVENOUS at 10:34

## 2024-10-29 RX ADMIN — HYDROXYZINE HYDROCHLORIDE 50 MG: 50 TABLET, FILM COATED ORAL at 22:03

## 2024-10-29 RX ADMIN — POTASSIUM CHLORIDE 40 MEQ: 1500 TABLET, EXTENDED RELEASE ORAL at 08:46

## 2024-10-29 RX ADMIN — INSULIN ASPART 2 UNITS: 100 INJECTION, SOLUTION INTRAVENOUS; SUBCUTANEOUS at 13:22

## 2024-10-29 RX ADMIN — ACETAMINOPHEN 975 MG: 325 TABLET ORAL at 08:46

## 2024-10-29 RX ADMIN — METOPROLOL TARTRATE 50 MG: 25 TABLET, FILM COATED ORAL at 21:03

## 2024-10-29 RX ADMIN — HEPARIN SODIUM 5000 UNITS: 10000 INJECTION, SOLUTION INTRAVENOUS; SUBCUTANEOUS at 21:03

## 2024-10-29 RX ADMIN — ACETAMINOPHEN 975 MG: 325 TABLET ORAL at 21:03

## 2024-10-29 RX ADMIN — ONDANSETRON 4 MG: 2 INJECTION INTRAMUSCULAR; INTRAVENOUS at 14:14

## 2024-10-29 RX ADMIN — SENNOSIDES AND DOCUSATE SODIUM 1 TABLET: 8.6; 5 TABLET ORAL at 21:03

## 2024-10-29 RX ADMIN — NITROFURANTOIN MONOHYDRATE/MACROCRYSTALS 100 MG: 75; 25 CAPSULE ORAL at 21:06

## 2024-10-29 RX ADMIN — HEPARIN SODIUM 5000 UNITS: 10000 INJECTION, SOLUTION INTRAVENOUS; SUBCUTANEOUS at 13:18

## 2024-10-29 RX ADMIN — METOPROLOL TARTRATE 12.5 MG: 25 TABLET, FILM COATED ORAL at 13:18

## 2024-10-29 RX ADMIN — CALCIUM GLUCONATE 1 G: 20 INJECTION, SOLUTION INTRAVENOUS at 06:51

## 2024-10-29 RX ADMIN — HEPARIN SODIUM 5000 UNITS: 10000 INJECTION, SOLUTION INTRAVENOUS; SUBCUTANEOUS at 04:09

## 2024-10-29 RX ADMIN — FUROSEMIDE 40 MG: 10 INJECTION, SOLUTION INTRAVENOUS at 06:52

## 2024-10-29 RX ADMIN — OMEPRAZOLE 20 MG: 20 TABLET, DELAYED RELEASE ORAL at 18:32

## 2024-10-29 RX ADMIN — NITROFURANTOIN MONOHYDRATE/MACROCRYSTALS 100 MG: 75; 25 CAPSULE ORAL at 08:47

## 2024-10-29 RX ADMIN — METFORMIN ER 500 MG 1500 MG: 500 TABLET ORAL at 18:25

## 2024-10-29 RX ADMIN — METOPROLOL TARTRATE 50 MG: 25 TABLET, FILM COATED ORAL at 08:46

## 2024-10-29 RX ADMIN — INSULIN ASPART 1 UNITS: 100 INJECTION, SOLUTION INTRAVENOUS; SUBCUTANEOUS at 18:28

## 2024-10-29 RX ADMIN — ASPIRIN 81 MG CHEWABLE TABLET 162 MG: 81 TABLET CHEWABLE at 08:46

## 2024-10-29 NOTE — PLAN OF CARE
sc  Patient Name: Holly Beal   MRN: [id number]   Date of Admission: 10/23/2024    Procedure: Procedure(s):  CORONARY ARTERY BYPASS GRAFT TIMES FIVE, LEFT INTERNAL MAMMARY ARTERY HARVEST, LEFT AND RIGHT LEG ENDOSCOPIC VESSEL PROCUREMENT,  LEFT RADIAL ARTERY HARVEST  EPIAORTIC ULTRASOUND, ANESTHESIA TRANSESOPHAGEAL ECHOCARDIOGRAM    Post Op day #:4    Subjective (Patient focus/Primary Problem for shift): promoting sleep, electrolyte replacement          Pain Goal 0/10 Pain Rating 0/10           Pain Medication/ Regime effective to reduce patient pain scheduled tylenol    Objective (Physical assessment):           Rhythm:  sinus rhythm with bundle branch block            Bowel Activity: no if Yes indicate when:           Bowel Medications: no, declined            Incision: healing well          Incentive Spirometry Q 1-2 hour when awake:  yes Volume: 1000          Epicardial Pacing Wires:  no            Patient Activity:           Up to chair for meals: yes          Ambulation with RN x2 (Not including CR): not applicable            Is patient in home clothes:no             Chest Tubes   Pleural: no Draining: not applicable               Suction: not applicable              Mediastinal: no Draining: not applicable               Suction: not applicable   Dressing Change Daily:no If No, why?                     Urinary Catheter: no           Preventative WOC consult (need MD order): no       Assessment (Nursing primary shift focus): VSS. Put on 1L O2 while sleeping, otherwise on RA. Pt had first shower last evening. Pt reports feeling very fatigued. Gave atarax overnight to promote sleep. No pain or SOB.    Plan (Patient Care Plan/focus): Ambulate twice today. Replace electrolytes. Encourage IS use.       Alycia Calzada RN   10/29/2024   6:18 AM

## 2024-10-29 NOTE — PROGRESS NOTES
[facility]    Medicine Progress Note - Hospitalist Service    Date of Admission:  10/23/2024    Assessment & Plan                  Holly Beal is a 69 year old female with HTN, HL, DM2, accelerating angina who had an abnormal stress test leading to a coronary angiogram on 10/23 which showed multi-vessel CAD.  Patient is now status post CABG x 5.  Doing fairly well postoperatively.  HMS asked to help manage diabetes. Hospital Day: 7       Multi-vessel CAD  -- Coronary angiogram completed on 10/23 which showed multivessel disease  -- S/p CABG x5 on 10/25 by Dr. Fuentes  -- , Home Crestor increased to 40mg every day  -- Postop cares per CV Surgery  - Started on metoprolol  -On IV Lasix  -- Chest tubes and Bojorquez catheter out  - She seems a bit deconditioned but she is hoping to be able to go home rather than TCU  -Has been pretty fatigued uncertain cause. Monitor closely     DM 2  -- Accu-checks, medium sliding scale, hypoglycemia protocol  -- A1c: 7.1%, usually she is only on metformin 500 mg daily.  blood sugars have been running much higher remaining uncontrolled even with addition of Lantus 10 units and increasing her metformin to 1000 mg.  Hospitalist requested to assist with DM management.  -She really is not eating all that much.  She is not on a carb controlled diet, I am a little worried restricting her carbs will make her diet choices too limited.  Therefore leave her diet the same.  She is on antibiotic for UTI, maybe this is driving BG up?  Therefore would monitor closely since BG could start to go down with infection improving.  -For now, we will continue uptitrating her metformin.  Lantus has been discontinued, agree with this since seems extreme to start her on outpatient insulin at this point, will try to control diabetes with oral agents  -Discontinue bedtime sliding scale to reduce risk of lows while we are titrating meds.  -Maybe add a.m. dose of glipizide if blood  sugars still running high    UTI?  -Surgeon started her on Macrobid yesterday, culture with mixed ned, consider discontinuing unless there are surgical reasons for this medication     Acute blood loss anemia  -- Monitor CBC  -Hemoglobin laure 8.9, has not needed transfusion     Leukocytosis  -- Likely reactive.  Resolved now     Essential HTN  -- Metoprolol, Norvasc  -- Hydralazine iv prn per CV surgery     HLD  -- Continue Increased Crestor at 40mg every day with goal of LDL<70     Mild intermittent asthma w/o acute exacerbation  -- Albuterol inhaler prn     GERD  -- PTA Omeprazole     Anxiety d/o  -- PTA Alprazolam. Prn Atarax    Constipation  -resolved, stooling well now          Diet: Low Saturated Fat Na <2400 mg    DVT Prophylaxis: Moderate risk.   Heparin SQ  Bojorquez Catheter: Not present  Lines: None     Cardiac Monitoring: ACTIVE order. Indication: Open heart surgery (72 hours)  Code Status: Full Code      Clinically Significant Risk Factors          # Hypochloremia: Lowest Cl = 96 mmol/L in last 2 days, will monitor as appropriate  # Hypocalcemia: Lowest iCa = 4.2 mg/dL in last 2 days, will monitor and replace as appropriate                    # DMII: A1C = 7.1 % (Ref range: <5.7 %) within past 6 months         # Financial/Environmental Concerns: none  # Asthma: noted on problem list  # History of CABG: noted on surgical history       Disposition Plan     Medically Ready for Discharge: Anticipated Tomorrow         Discharge barrier(s): Deconditioning, postop recovery, BG control  Care discussed with: Patient,       Esperanza Wilson MD  Hospitalist Service  [facility]  Securely message with [employer] (more info)  Text page via [employer] Paging/Directory   ______________________________________________________________________      Physical Exam   Vital Signs: Temp: 97.8  F (36.6  C) Temp src: Oral BP: 121/62 Pulse: 91   Resp: 18 SpO2: 95 % O2 Device: None (Room air) Oxygen Delivery: 1  LPM  Weight: 153 lbs 1.6 oz    General: in no apparent distress, non-toxic, and alert female lying in hospital bed oriented x3. Seems fatigued  HEENT: Head normocephalic atraumatic, oral mucosa moist. Sclerae anicteric  Skin: No rashes or lesions  Extremities: No peripheral edema  Psych: Normal affect, mood a bit dysthymic  Neuro: Grossly normal      Medical Decision Making               Data   Recent Results (from the past 16 hours)   Ionized Calcium    Collection Time: 10/29/24  4:20 AM   Result Value Ref Range    Calcium Ionized Whole Blood 4.2 (L) 4.4 - 5.2 mg/dL   Platelet count    Collection Time: 10/29/24  4:20 AM   Result Value Ref Range    Platelet Count 254 150 - 450 10e3/uL   Basic metabolic panel    Collection Time: 10/29/24  4:20 AM   Result Value Ref Range    Sodium 141 135 - 145 mmol/L    Potassium 3.4 3.4 - 5.3 mmol/L    Chloride 96 (L) 98 - 107 mmol/L    Carbon Dioxide (CO2) 33 (H) 22 - 29 mmol/L    Anion Gap 12 7 - 15 mmol/L    Urea Nitrogen 19.4 8.0 - 23.0 mg/dL    Creatinine 0.76 0.51 - 0.95 mg/dL    GFR Estimate 85 >60 mL/min/1.73m2    Calcium 8.7 (L) 8.8 - 10.4 mg/dL    Glucose 130 (H) 70 - 99 mg/dL   Magnesium    Collection Time: 10/29/24  4:20 AM   Result Value Ref Range    Magnesium 2.1 1.7 - 2.3 mg/dL   Phosphorus    Collection Time: 10/29/24  4:20 AM   Result Value Ref Range    Phosphorus 2.3 (L) 2.5 - 4.5 mg/dL   WBC count    Collection Time: 10/29/24  4:20 AM   Result Value Ref Range    WBC Count 11.0 4.0 - 11.0 10e3/uL   Glucose by meter    Collection Time: 10/29/24  7:50 AM   Result Value Ref Range    GLUCOSE BY METER POCT 205 (H) 70 - 99 mg/dL   Glucose by meter    Collection Time: 10/29/24 11:19 AM   Result Value Ref Range    GLUCOSE BY METER POCT 212 (H) 70 - 99 mg/dL   Potassium    Collection Time: 10/29/24  2:08 PM   Result Value Ref Range    Potassium 4.0 3.4 - 5.3 mmol/L       Interval History     Patient complains she just feels so exhausted.  All her labs are reassuring.  She  has a little bit of what feels like fibrous tissue forming at the proximal aspect of her sternal incision.  No induration, no purulence expressed with gentle palpation.  White count has normalized.  I think she is doing pretty well and might be able to go home soon.  [facility] [place]

## 2024-10-29 NOTE — PROGRESS NOTES
CVTS Daily Progress Note   POD#4 s/p CABGx5  Attending: Gina  LOS: 6    SUBJECTIVE/INTERVAL EVENTS:    No acute events overnight. Normotensive. NSR. Patient progressing well. Maintaining oxygen saturations on room air. Seen up in chair this AM and working with therapy. Pain well controlled. +BM. Limited appetite; also complains of some mild nausea. UOP adequate. Started on Macrobid yesterday for ?UTI. WBC normalized today. Patient denies new chest pain, shortness of breath, abdominal pain, calf pain. Patient has no questions today.     OBJECTIVE:  Temp:  [97.7  F (36.5  C)-98.5  F (36.9  C)] 97.7  F (36.5  C)  Pulse:  [79-97] 97  Resp:  [16-20] 18  BP: (104-118)/(52-58) 118/58  SpO2:  [88 %-97 %] 97 %  Vitals:    10/25/24 0403 10/26/24 0630 10/27/24 0631 10/28/24 0404   Weight: 70.6 kg (155 lb 9.6 oz) 75.6 kg (166 lb 9.6 oz) 72.8 kg (160 lb 6.4 oz) 72 kg (158 lb 12.8 oz)    10/29/24 0636   Weight: 69.4 kg (153 lb 1.6 oz)       Clinically Significant Risk Factors          # Hypochloremia: Lowest Cl = 96 mmol/L in last 2 days, will monitor as appropriate  # Hypocalcemia: Lowest iCa = 4.2 mg/dL in last 2 days, will monitor and replace as appropriate                    # DMII: A1C = 7.1 % (Ref range: <5.7 %) within past 6 months       # Financial/Environmental Concerns: none  # Asthma: noted on problem list  # History of CABG: noted on surgical history                Current Medications:    Scheduled Meds:  Current Facility-Administered Medications   Medication Dose Route Frequency Provider Last Rate Last Admin    acetaminophen (TYLENOL) tablet 975 mg  975 mg Oral TID Esperanza Wilson MD   975 mg at 10/29/24 0846    Or    acetaminophen (TYLENOL) Suppository 650 mg  650 mg Rectal TID Esperanza Wilson MD        amLODIPine (NORVASC) tablet 2.5 mg  2.5 mg Oral Daily Vanesa Oreilly PA-C   2.5 mg at 10/29/24 0846    aspirin (ASA) chewable tablet 162 mg  162 mg Oral or NG Tube Daily Vanesa Oreilly PA-C   162  mg at 10/29/24 0846    cyanocobalamin (VITAMIN B-12) tablet 1,000 mcg  1,000 mcg Oral Daily Vanesa Oreilly PA-C   1,000 mcg at 10/29/24 0846    [START ON 10/30/2024] furosemide (LASIX) injection 40 mg  40 mg Intravenous Daily Vanesa Oreilly PA-C        heparin ANTICOAGULANT injection 5,000 Units  5,000 Units Subcutaneous Q8H Vanesa Oreilly PA-C   5,000 Units at 10/29/24 0409    insulin aspart (NovoLOG) injection (RAPID ACTING)  1-7 Units Subcutaneous TID AC Vanesa Oreilly PA-C   2 Units at 10/29/24 0849    insulin aspart (NovoLOG) injection (RAPID ACTING)  1-5 Units Subcutaneous At Bedtime Vanesa Oreilly PA-C   2 Units at 10/28/24 2210    Lidocaine (LIDOCARE) 4 % Patch 1-2 patch  1-2 patch Transdermal Q24H Vanesa Oreilly PA-C   1 patch at 10/28/24 2206    metFORMIN (GLUCOPHAGE XR) 24 hr tablet 1,000 mg  1,000 mg Oral Daily with supper Vanesa Oreilly PA-C   1,000 mg at 10/28/24 1736    metoprolol tartrate (LOPRESSOR) half-tab 12.5 mg  12.5 mg Oral TID Vanesa Oreilly PA-C   12.5 mg at 10/29/24 0409    metoprolol tartrate (LOPRESSOR) tablet 50 mg  50 mg Oral BID Vanesa Oreilly PA-C   50 mg at 10/29/24 0846    nitroFURantoin macrocrystal-monohydrate (MACROBID) capsule 100 mg  100 mg Oral Q12H ROYCE (08/20) Vanesa Oreilly PA-C   100 mg at 10/29/24 0847    omeprazole (PriLOSEC OTC) EC tablet 20 mg  20 mg Oral QPM Esperanza Wilson MD   20 mg at 10/28/24 2049    polyethylene glycol (MIRALAX) Packet 17 g  17 g Oral Daily Vanesa Oreilly PA-C   17 g at 10/27/24 0945    potassium phosphate 9 mmol in sodium chloride 0.9 % 250 mL intermittent infusion  9 mmol Intravenous Once Esperanza Wilson MD        rosuvastatin (CRESTOR) tablet 40 mg  40 mg Oral Daily Vanesa Oreilly PA-C   40 mg at 10/28/24 2049    senna-docusate (SENOKOT-S/PERICOLACE) 8.6-50 MG per tablet 1 tablet  1 tablet Oral BID Vanesa Oreilly PA-C   1 tablet at  10/27/24 2040     Continuous Infusions:  Current Facility-Administered Medications   Medication Dose Route Frequency Provider Last Rate Last Admin     PRN Meds:.  Current Facility-Administered Medications   Medication Dose Route Frequency Provider Last Rate Last Admin    albuterol (PROVENTIL HFA/VENTOLIN HFA) inhaler  2 puff Inhalation Q6H PRN Russell Cheng MD        bisacodyl (DULCOLAX) suppository 10 mg  10 mg Rectal Daily PRN Vanesa Oreilly PA-C        calcium gluconate 1 g in 50 mL in sodium chloride intermittent infusion  1 g Intravenous Once PRN Vanesa Oreilly PA-C   1 g at 10/29/24 0651    calcium gluconate 2 g in  mL intermittent infusion  2 g Intravenous Once PRN Vanesa Oreilly PA-C        calcium gluconate 3 g in sodium chloride 0.9 % 100 mL intermittent infusion  3 g Intravenous Once PRN Vanesa Oreilly PA-C        glucose gel 15-30 g  15-30 g Oral Q15 Min PRN Vanesa Oreilly PA-C        Or    dextrose 50 % injection 25-50 mL  25-50 mL Intravenous Q15 Min PRN Vanesa Oreilly PA-C        Or    glucagon injection 1 mg  1 mg Subcutaneous Q15 Min PRN Vanesa Oreilly PA-C        hydrALAZINE (APRESOLINE) injection 10 mg  10 mg Intravenous Q30 Min PRN Vanesa Oreilly PA-C        hydrOXYzine HCl (ATARAX) tablet 25 mg  25 mg Oral Q6H PRN Vanesa Oreilly PA-C   25 mg at 10/27/24 2043    Or    hydrOXYzine HCl (ATARAX) tablet 50 mg  50 mg Oral Q6H PRN Vanesa Oreilly PA-C   50 mg at 10/29/24 0051    ketorolac (TORADOL) injection 30 mg  30 mg Intravenous Q6H PRN Vanesa Oreilly PA-C        lactated ringers BOLUS 250 mL  250 mL Intravenous Q15 Min PRN Vanesa Oreilly PA-C        magnesium hydroxide (MILK OF MAGNESIA) suspension 30 mL  30 mL Oral Daily PRN Vanesa Oreilly PA-C   30 mL at 10/27/24 1631    naloxone (NARCAN) injection 0.2 mg  0.2 mg Intravenous Q2 Min PRN Vanesa Oreilly PA-C        Or     naloxone (NARCAN) injection 0.4 mg  0.4 mg Intravenous Q2 Min PRN Vanesa Oreilly PA-C        Or    naloxone (NARCAN) injection 0.2 mg  0.2 mg Intramuscular Q2 Min PRN Vanesa Oreilly PA-C        Or    naloxone (NARCAN) injection 0.4 mg  0.4 mg Intramuscular Q2 Min PRN Vanesa Oreilly PA-C        ondansetron (ZOFRAN ODT) ODT tab 4 mg  4 mg Oral Q6H PRN Vanesa Oreilly PA-C   4 mg at 10/26/24 1745    Or    ondansetron (ZOFRAN) injection 4 mg  4 mg Intravenous Q6H PRN Vanesa Oreilly PA-C   4 mg at 10/29/24 0651    oxyCODONE (ROXICODONE) tablet 5 mg  5 mg Oral Q4H PRN Vanesa Oreilly PA-C   5 mg at 10/26/24 1746    Or    oxyCODONE (ROXICODONE) tablet 10 mg  10 mg Oral Q4H PRN Vanesa Oreilly PA-C        prochlorperazine (COMPAZINE) injection 5 mg  5 mg Intravenous Q6H PRN Vanesa Oreilly PA-C   5 mg at 10/25/24 2100    Or    prochlorperazine (COMPAZINE) tablet 5 mg  5 mg Oral Q6H PRN Vanesa Oreilly PA-C           Cardiographics:    Telemetry monitoring demonstrates NSR with rates in the 80s per my personal review.    Imaging:  Results for orders placed or performed during the hospital encounter of 10/23/24   US Carotid Bilateral    Impression    IMPRESSION:  1.  Mild plaque formation, velocities consistent with less than 50% stenosis in the right internal carotid artery.  2.  Mild plaque formation, velocities consistent with less than 50% stenosis in the left internal carotid artery.  3.  Flow within the vertebral arteries is antegrade.   XR Chest Port 1 View    Impression    IMPRESSION: Endotracheal tube tip terminates in the mid thoracic trachea approximately 4 cm from the venice. Right IJ introducer tip terminates in the lower SVC. Mediastinal and left pleural drains are present. Epicardial pacing leads. No unexpected   radiopaque foreign bodies.    Left basilar atelectasis. No significant pleural effusion or pneumothorax. Nonenlarged heart  with postoperative changes from CABG. Mitral annular calcifications. Prior lumbar vertebroplasties.   XR Chest Port 1 View    Impression    IMPRESSION:   *  Ill-defined hyperdensities overlie the midline upper abdomen and midline lower chest (arrows) and favored to be extrinsic to the patient, but mimics the appearance of a retrocardiac opacity. Recommend repeating portable chest radiograph after removal   of all overlying objects.  *  Left-sided chest tube with no pneumothorax. Possible trace left pleural effusion.  *  Mild cardiomegaly with CABG.  *  Right subclavian central catheter tip in the RA/SVC junction.       Labs, personally reviewed.  Hemoglobin   Date Value Ref Range Status   10/28/2024 9.4 (L) 11.7 - 15.7 g/dL Final   10/27/2024 9.3 (L) 11.7 - 15.7 g/dL Final   10/26/2024 8.9 (L) 11.7 - 15.7 g/dL Final     WBC Count   Date Value Ref Range Status   10/29/2024 11.0 4.0 - 11.0 10e3/uL Final   10/28/2024 14.5 (H) 4.0 - 11.0 10e3/uL Final   10/27/2024 17.9 (H) 4.0 - 11.0 10e3/uL Final     Platelet Count   Date Value Ref Range Status   10/29/2024 254 150 - 450 10e3/uL Final   10/28/2024 186 150 - 450 10e3/uL Final   10/27/2024 197 150 - 450 10e3/uL Final     Creatinine   Date Value Ref Range Status   10/29/2024 0.76 0.51 - 0.95 mg/dL Final   10/28/2024 0.71 0.51 - 0.95 mg/dL Final   10/27/2024 0.87 0.51 - 0.95 mg/dL Final     Potassium   Date Value Ref Range Status   10/29/2024 3.4 3.4 - 5.3 mmol/L Final   10/28/2024 3.9 3.4 - 5.3 mmol/L Final   10/27/2024 3.9 3.4 - 5.3 mmol/L Final   10/29/2021 4.3 3.5 - 5.0 mmol/L Final   10/20/2020 4.1 3.5 - 5.0 mmol/L Final   04/23/2019 5.2 (H) 3.5 - 5.0 mmol/L Final     Potassium POCT   Date Value Ref Range Status   10/25/2024 4.2 3.4 - 5.3 mmol/L Final   10/25/2024 4.6 3.4 - 5.3 mmol/L Final   10/25/2024 5.7 (H) 3.4 - 5.3 mmol/L Final     Magnesium   Date Value Ref Range Status   10/29/2024 2.1 1.7 - 2.3 mg/dL Final   10/28/2024 2.3 1.7 - 2.3 mg/dL Final   10/27/2024  2.3 1.7 - 2.3 mg/dL Final          I/O:  I/O last 3 completed shifts:  In: 1320 [P.O.:1320]  Out: 2900 [Urine:2900]       Physical Exam:    General: Patient seen up in chair. NAD. Conversant. Pleasant  CV: RRR on monitor. 2+ peripheral pulses in all extremities. Mild edema. Incision C/D/I.  Pulm: Non-labored effort on room air.   Abd: Soft, NT, ND  Ext: Mild pedal edema, SCDs in place, warm, distal pulses intact  Neuro: CNs grossly intact      ASSESSMENT/PLAN:    Holly Beal is a 69 year old female with a history of CAD who is s/p CABGx5.    Principal Problem:    Coronary artery disease due to lipid rich plaque  Active Problems:    Calcium kidney stone    Kidney stone on left side    Asthma    Abnormal stress test    Status post coronary angiogram    Chest pain    Accelerating angina (H)    Mild intermittent asthma without complication    Diabetes mellitus type II, non insulin dependent (H)    Therapeutic opioid induced constipation    Physical deconditioning    Gastroesophageal reflux disease without esophagitis        NEURO:   - Scheduled Tylenol/lidocaine patches and PRN Tylenol/oxycodone/Toradol for pain  - PRN Atarax    CV:   - Pre-op EF 60-65%  - Normotensive  - Metoprolol 37.5mg BID increased to 50mg BID  - Amlodipine 2.5mg daily (radial artery graft protection)  - ASA 162mg daily  - Rosuvastatin 40mg daily  - Chest tubes and TPW removed 10/27    PULM:   - Extubated on POD0  - Maintaining oxygen saturations on room air  - Encourage pulmonary toilet    FEN/GI:  - Continue electrolyte replacement protocol  - Cardiac; ADAT  - Bowel regimen  - PTA Vit B12    RENAL:  - Adequate UOP/hr. Continue to monitor closely.  - Cr 0.76  - Diuresis with 40mg IV Lasix daily (decreased)    HEME:  - Acute blood loss anemia post-op.   - No bleeding concerns. Hep SQ, ASA    ID:  - [place] op ppx complete, afebrile.  - Leukocytosis and dirty UA 10/27. BID Macrobid started 10/28 for 5 days. WBC normalized today,    ENDO:   - SSI  -  Restarted PTA Metformin (increased dose from PTA)  - Will talk with hospitalist team re: options for better BG control.    PPx:   - DVT: SCDs, SQ heparin TID, ambulation   - GI: Protonix 40mg IV/PO daily    DISPO:   - General telemetry status  - Medically Ready for Discharge: Anticipated in 1-2 days.           Patient discussed with Dr. Cronin.        _______  Vanesa Oreilly PA-C  Cardiothoracic Surgery  [phone]

## 2024-10-29 NOTE — PLAN OF CARE
Goal Outcome Evaluation:    sc  Patient Name: Holly Beal   MRN: [id number]   Date of Admission: 10/23/2024    Procedure: Procedure(s):  CORONARY ARTERY BYPASS GRAFT TIMES FIVE, LEFT INTERNAL MAMMARY ARTERY HARVEST, LEFT AND RIGHT LEG ENDOSCOPIC VESSEL PROCUREMENT,  LEFT RADIAL ARTERY HARVEST  EPIAORTIC ULTRASOUND, ANESTHESIA TRANSESOPHAGEAL ECHOCARDIOGRAM    Post Op day #:4    Subjective (Patient focus/Primary Problem for shift): Fatigue          Pain Goal0 Pain Rating0           Pain Medication/ Regime effective to reduce patient painsched tylenol PO    Objective (Physical assessment):           Rhythm: normal sinus rhythm BBB            Bowel Activity: no if Yes indicate when:           Bowel Medications: refused            Incision: healing well          Incentive Spirometry Q 1-2 hour when awake:  yes Volume: 1000 mL          Epicardial Pacing Wires:  no            Patient Activity:           Up to chair for meals: yes          Ambulation with RN x2 (Not including CR): no ; feeling tired           Is patient in home clothes:no        Assessment (Nursing primary shift focus): Feeling fatigued and having nausea.  Plan (Patient Care Plan/focus): Encouraged OOB activities.    Zofran IV given x1 for nausea. Still having poor appetite. Encouraged OOB.      Tobias Smith RN   10/29/2024   5:39 PM

## 2024-10-30 LAB
ANION GAP SERPL CALCULATED.3IONS-SCNC: 10 MMOL/L (ref 7–15)
BUN SERPL-MCNC: 18.7 MG/DL (ref 8–23)
CA-I BLD-MCNC: 4.8 MG/DL (ref 4.4–5.2)
CALCIUM SERPL-MCNC: 9.7 MG/DL (ref 8.8–10.4)
CHLORIDE SERPL-SCNC: 98 MMOL/L (ref 98–107)
CREAT SERPL-MCNC: 0.9 MG/DL (ref 0.51–0.95)
EGFRCR SERPLBLD CKD-EPI 2021: 69 ML/MIN/1.73M2
GLUCOSE BLDC GLUCOMTR-MCNC: 168 MG/DL (ref 70–99)
GLUCOSE BLDC GLUCOMTR-MCNC: 202 MG/DL (ref 70–99)
GLUCOSE BLDC GLUCOMTR-MCNC: 256 MG/DL (ref 70–99)
GLUCOSE BLDC GLUCOMTR-MCNC: 258 MG/DL (ref 70–99)
GLUCOSE SERPL-MCNC: 172 MG/DL (ref 70–99)
HCO3 SERPL-SCNC: 32 MMOL/L (ref 22–29)
MAGNESIUM SERPL-MCNC: 2 MG/DL (ref 1.7–2.3)
PHOSPHATE SERPL-MCNC: 2.3 MG/DL (ref 2.5–4.5)
POTASSIUM SERPL-SCNC: 4.3 MMOL/L (ref 3.4–5.3)
SODIUM SERPL-SCNC: 140 MMOL/L (ref 135–145)

## 2024-10-30 PROCEDURE — 250N000013 HC RX MED GY IP 250 OP 250 PS 637: Performed by: PHYSICIAN ASSISTANT

## 2024-10-30 PROCEDURE — 84100 ASSAY OF PHOSPHORUS: CPT | Performed by: HOSPITALIST

## 2024-10-30 PROCEDURE — 99232 SBSQ HOSP IP/OBS MODERATE 35: CPT | Performed by: INTERNAL MEDICINE

## 2024-10-30 PROCEDURE — 250N000013 HC RX MED GY IP 250 OP 250 PS 637: Performed by: HOSPITALIST

## 2024-10-30 PROCEDURE — 250N000009 HC RX 250: Performed by: INTERNAL MEDICINE

## 2024-10-30 PROCEDURE — 210N000001 HC R&B IMCU HEART CARE

## 2024-10-30 PROCEDURE — 80048 BASIC METABOLIC PNL TOTAL CA: CPT | Performed by: PHYSICIAN ASSISTANT

## 2024-10-30 PROCEDURE — 82330 ASSAY OF CALCIUM: CPT | Performed by: PHYSICIAN ASSISTANT

## 2024-10-30 PROCEDURE — 250N000013 HC RX MED GY IP 250 OP 250 PS 637: Performed by: STUDENT IN AN ORGANIZED HEALTH CARE EDUCATION/TRAINING PROGRAM

## 2024-10-30 PROCEDURE — 36415 COLL VENOUS BLD VENIPUNCTURE: CPT | Performed by: PHYSICIAN ASSISTANT

## 2024-10-30 PROCEDURE — 83735 ASSAY OF MAGNESIUM: CPT | Performed by: STUDENT IN AN ORGANIZED HEALTH CARE EDUCATION/TRAINING PROGRAM

## 2024-10-30 PROCEDURE — 250N000011 HC RX IP 250 OP 636: Performed by: PHYSICIAN ASSISTANT

## 2024-10-30 RX ORDER — LIDOCAINE 40 MG/G
CREAM TOPICAL 3 TIMES DAILY
Status: DISCONTINUED | OUTPATIENT
Start: 2024-10-30 | End: 2024-10-31 | Stop reason: HOSPADM

## 2024-10-30 RX ORDER — MAGNESIUM OXIDE 400 MG/1
400 TABLET ORAL EVERY 4 HOURS
Status: COMPLETED | OUTPATIENT
Start: 2024-10-30 | End: 2024-10-30

## 2024-10-30 RX ORDER — METOPROLOL TARTRATE 25 MG/1
75 TABLET, FILM COATED ORAL 2 TIMES DAILY
Status: DISCONTINUED | OUTPATIENT
Start: 2024-10-30 | End: 2024-10-31

## 2024-10-30 RX ADMIN — AMLODIPINE BESYLATE 2.5 MG: 2.5 TABLET ORAL at 08:40

## 2024-10-30 RX ADMIN — LIDOCAINE: 40 CREAM TOPICAL at 20:20

## 2024-10-30 RX ADMIN — MAGNESIUM OXIDE TAB 400 MG (241.3 MG ELEMENTAL MG) 400 MG: 400 (241.3 MG) TAB at 08:40

## 2024-10-30 RX ADMIN — POTASSIUM & SODIUM PHOSPHATES POWDER PACK 280-160-250 MG 1 PACKET: 280-160-250 PACK at 11:26

## 2024-10-30 RX ADMIN — INSULIN ASPART 1 UNITS: 100 INJECTION, SOLUTION INTRAVENOUS; SUBCUTANEOUS at 17:18

## 2024-10-30 RX ADMIN — METOPROLOL TARTRATE 75 MG: 25 TABLET, FILM COATED ORAL at 08:39

## 2024-10-30 RX ADMIN — METOPROLOL TARTRATE 12.5 MG: 25 TABLET, FILM COATED ORAL at 04:11

## 2024-10-30 RX ADMIN — MAGNESIUM OXIDE TAB 400 MG (241.3 MG ELEMENTAL MG) 400 MG: 400 (241.3 MG) TAB at 11:26

## 2024-10-30 RX ADMIN — HYDROXYZINE HYDROCHLORIDE 50 MG: 50 TABLET, FILM COATED ORAL at 20:23

## 2024-10-30 RX ADMIN — HEPARIN SODIUM 5000 UNITS: 10000 INJECTION, SOLUTION INTRAVENOUS; SUBCUTANEOUS at 20:18

## 2024-10-30 RX ADMIN — ASPIRIN 81 MG CHEWABLE TABLET 162 MG: 81 TABLET CHEWABLE at 08:40

## 2024-10-30 RX ADMIN — FUROSEMIDE 40 MG: 10 INJECTION, SOLUTION INTRAVENOUS at 08:40

## 2024-10-30 RX ADMIN — CYANOCOBALAMIN TAB 1000 MCG 1000 MCG: 1000 TAB at 08:40

## 2024-10-30 RX ADMIN — POTASSIUM & SODIUM PHOSPHATES POWDER PACK 280-160-250 MG 1 PACKET: 280-160-250 PACK at 17:24

## 2024-10-30 RX ADMIN — NITROFURANTOIN MONOHYDRATE/MACROCRYSTALS 100 MG: 75; 25 CAPSULE ORAL at 20:24

## 2024-10-30 RX ADMIN — OMEPRAZOLE 20 MG: 20 TABLET, DELAYED RELEASE ORAL at 20:23

## 2024-10-30 RX ADMIN — METOPROLOL TARTRATE 75 MG: 25 TABLET, FILM COATED ORAL at 20:24

## 2024-10-30 RX ADMIN — HEPARIN SODIUM 5000 UNITS: 10000 INJECTION, SOLUTION INTRAVENOUS; SUBCUTANEOUS at 11:26

## 2024-10-30 RX ADMIN — SENNOSIDES AND DOCUSATE SODIUM 1 TABLET: 8.6; 5 TABLET ORAL at 08:40

## 2024-10-30 RX ADMIN — ROSUVASTATIN CALCIUM 40 MG: 40 TABLET, FILM COATED ORAL at 20:24

## 2024-10-30 RX ADMIN — INSULIN ASPART 2 UNITS: 100 INJECTION, SOLUTION INTRAVENOUS; SUBCUTANEOUS at 08:48

## 2024-10-30 RX ADMIN — ACETAMINOPHEN 975 MG: 325 TABLET ORAL at 14:10

## 2024-10-30 RX ADMIN — HEPARIN SODIUM 5000 UNITS: 10000 INJECTION, SOLUTION INTRAVENOUS; SUBCUTANEOUS at 04:12

## 2024-10-30 RX ADMIN — ACETAMINOPHEN 975 MG: 325 TABLET ORAL at 08:40

## 2024-10-30 RX ADMIN — METFORMIN ER 500 MG 1500 MG: 500 TABLET ORAL at 17:19

## 2024-10-30 RX ADMIN — METOPROLOL TARTRATE 12.5 MG: 25 TABLET, FILM COATED ORAL at 12:46

## 2024-10-30 RX ADMIN — POTASSIUM & SODIUM PHOSPHATES POWDER PACK 280-160-250 MG 1 PACKET: 280-160-250 PACK at 08:39

## 2024-10-30 RX ADMIN — INSULIN ASPART 3 UNITS: 100 INJECTION, SOLUTION INTRAVENOUS; SUBCUTANEOUS at 11:40

## 2024-10-30 RX ADMIN — LIDOCAINE: 40 CREAM TOPICAL at 12:46

## 2024-10-30 RX ADMIN — METOPROLOL TARTRATE 12.5 MG: 25 TABLET, FILM COATED ORAL at 17:19

## 2024-10-30 RX ADMIN — NITROFURANTOIN MONOHYDRATE/MACROCRYSTALS 100 MG: 75; 25 CAPSULE ORAL at 08:39

## 2024-10-30 RX ADMIN — ACETAMINOPHEN 975 MG: 325 TABLET ORAL at 20:24

## 2024-10-30 NOTE — PROGRESS NOTES
CVTS Daily Progress Note   POD#5 s/p CABGx5  Attending: Gina  LOS: 7    SUBJECTIVE/INTERVAL EVENTS:    No acute events overnight. Normotensive. NSR. Patient progressing well. Maintaining oxygen saturations on room air. Seen up in chair this AM. She's working with rehab. Pain well controlled. +BM. Limited appetite although nausea resolved today. UOP adequate. Remains on Macrobid for ?UTI. Patient denies new chest pain, shortness of breath, abdominal pain, calf pain. Patient has no questions today.     OBJECTIVE:  Temp:  [97.7  F (36.5  C)-98.3  F (36.8  C)] 98.2  F (36.8  C)  Pulse:  [] 100  Resp:  [16-18] 18  BP: (121-132)/(58-63) 127/59  SpO2:  [92 %-97 %] 97 %  Vitals:    10/26/24 0630 10/27/24 0631 10/28/24 0404 10/29/24 0636   Weight: 75.6 kg (166 lb 9.6 oz) 72.8 kg (160 lb 6.4 oz) 72 kg (158 lb 12.8 oz) 69.4 kg (153 lb 1.6 oz)    10/30/24 0410   Weight: 69.4 kg (153 lb)       Clinically Significant Risk Factors          # Hypochloremia: Lowest Cl = 96 mmol/L in last 2 days, will monitor as appropriate  # Hypocalcemia: Lowest iCa = 4.2 mg/dL in last 2 days, will monitor and replace as appropriate                    # DMII: A1C = 7.1 % (Ref range: <5.7 %) within past 6 months       # Financial/Environmental Concerns: none  # Asthma: noted on problem list  # History of CABG: noted on surgical history                Current Medications:    Scheduled Meds:  Current Facility-Administered Medications   Medication Dose Route Frequency Provider Last Rate Last Admin    acetaminophen (TYLENOL) tablet 975 mg  975 mg Oral TID Esperanza Wilson MD   975 mg at 10/29/24 2103    Or    acetaminophen (TYLENOL) Suppository 650 mg  650 mg Rectal TID Esperanza Wilson MD        amLODIPine (NORVASC) tablet 2.5 mg  2.5 mg Oral Daily Vanesa Oreilly PA-C   2.5 mg at 10/29/24 0846    aspirin (ASA) chewable tablet 162 mg  162 mg Oral or NG Tube Daily Vanesa Oreilly PA-C   162 mg at 10/29/24 0846    cyanocobalamin  (VITAMIN B-12) tablet 1,000 mcg  1,000 mcg Oral Daily Vanesa Oreilly PA-C   1,000 mcg at 10/29/24 0846    furosemide (LASIX) injection 40 mg  40 mg Intravenous Daily Vanesa Oreilly PA-C        heparin ANTICOAGULANT injection 5,000 Units  5,000 Units Subcutaneous Q8H Vanesa Oreilly PA-C   5,000 Units at 10/30/24 0412    insulin aspart (NovoLOG) injection (RAPID ACTING)  1-7 Units Subcutaneous TID AC Vanesa Oreilly PA-C   1 Units at 10/29/24 1828    Lidocaine (LIDOCARE) 4 % Patch 1-2 patch  1-2 patch Transdermal Q24H Vanesa Oreilly PA-C   1 patch at 10/28/24 2206    magnesium oxide (MAG-OX) tablet 400 mg  400 mg Oral Q4H Russell Cheng MD        metFORMIN (GLUCOPHAGE XR) 24 hr tablet 1,500 mg  1,500 mg Oral Daily with supper Esperanza Wilson MD   1,500 mg at 10/29/24 1825    metoprolol tartrate (LOPRESSOR) half-tab 12.5 mg  12.5 mg Oral TID Vanesa Oreilly PA-C   12.5 mg at 10/30/24 0411    metoprolol tartrate (LOPRESSOR) tablet 75 mg  75 mg Oral BID Vanesa Oreilly PA-C        nitroFURantoin macrocrystal-monohydrate (MACROBID) capsule 100 mg  100 mg Oral Q12H [facility] (08/20) Vanesa Oreilly PA-C   100 mg at 10/29/24 2106    omeprazole (PriLOSEC OTC) EC tablet 20 mg  20 mg Oral QPM Esperanza Wilson MD   20 mg at 10/29/24 1832    polyethylene glycol (MIRALAX) Packet 17 g  17 g Oral Daily Vanesa Oreilly PA-C   17 g at 10/27/24 0945    potassium & sodium phosphates (NEUTRA-PHOS) Packet 1 packet  1 packet Oral or Feeding Tube Q4H Russell Cheng MD        rosuvastatin (CRESTOR) tablet 40 mg  40 mg Oral Daily Vanesa Oreilly PA-C   40 mg at 10/29/24 2103    senna-docusate (SENOKOT-S/PERICOLACE) 8.6-50 MG per tablet 1 tablet  1 tablet Oral BID Vanesa Oreilly PA-C   1 tablet at 10/29/24 2103     Continuous Infusions:  Current Facility-Administered Medications   Medication Dose Route Frequency Provider Last Rate Last Admin     PRN  Meds:.  Current Facility-Administered Medications   Medication Dose Route Frequency Provider Last Rate Last Admin    albuterol (PROVENTIL HFA/VENTOLIN HFA) inhaler  2 puff Inhalation Q6H PRN Russell Cheng MD        bisacodyl (DULCOLAX) suppository 10 mg  10 mg Rectal Daily PRN Vanesa Oreilly PA-C        calcium gluconate 1 g in 50 mL in sodium chloride intermittent infusion  1 g Intravenous Once PRN Vanesa Oreilly PA-C   1 g at 10/29/24 0651    calcium gluconate 2 g in  mL intermittent infusion  2 g Intravenous Once PRN Vanesa Oreilly PA-C        calcium gluconate 3 g in sodium chloride 0.9 % 100 mL intermittent infusion  3 g Intravenous Once PRN Vanesa Oreilly PA-C        glucose gel 15-30 g  15-30 g Oral Q15 Min PRN Vanesa Oreilly PA-C        Or    dextrose 50 % injection 25-50 mL  25-50 mL Intravenous Q15 Min PRN Vanesa Oreilly PA-C        Or    glucagon injection 1 mg  1 mg Subcutaneous Q15 Min PRN Vanesa Oreilly PA-C        hydrALAZINE (APRESOLINE) injection 10 mg  10 mg Intravenous Q30 Min PRN Vanesa Oreilly PA-C        hydrOXYzine HCl (ATARAX) tablet 25 mg  25 mg Oral Q6H PRN Vanesa Oreilly PA-C   25 mg at 10/27/24 2043    Or    hydrOXYzine HCl (ATARAX) tablet 50 mg  50 mg Oral Q6H PRN Vanesa Oreilly PA-C   50 mg at 10/29/24 2203    ketorolac (TORADOL) injection 30 mg  30 mg Intravenous Q6H PRN Vanesa Oreilly PA-C        lactated ringers BOLUS 250 mL  250 mL Intravenous Q15 Min PRN Vanesa Oreilly PA-C        magnesium hydroxide (MILK OF MAGNESIA) suspension 30 mL  30 mL Oral Daily PRN Vanesa Oreilly PA-C   30 mL at 10/27/24 1631    naloxone (NARCAN) injection 0.2 mg  0.2 mg Intravenous Q2 Min PRN Vanesa Oreilly PA-C        Or    naloxone (NARCAN) injection 0.4 mg  0.4 mg Intravenous Q2 Min PRN Vanesa Oreilly PA-C        Or    naloxone (NARCAN) injection 0.2 mg  0.2 mg  Intramuscular Q2 Min PRN Vanesa Oreilly PA-C        Or    naloxone (NARCAN) injection 0.4 mg  0.4 mg Intramuscular Q2 Min PRN Vanesa Oreilly PA-C        ondansetron (ZOFRAN ODT) ODT tab 4 mg  4 mg Oral Q6H PRN Vanesa Oreilly PA-C   4 mg at 10/26/24 1745    Or    ondansetron (ZOFRAN) injection 4 mg  4 mg Intravenous Q6H PRN Vanesa Oreilly PA-C   4 mg at 10/29/24 1414    oxyCODONE (ROXICODONE) tablet 5 mg  5 mg Oral Q4H PRN Vanesa Oreilly PA-C   5 mg at 10/26/24 1746    Or    oxyCODONE (ROXICODONE) tablet 10 mg  10 mg Oral Q4H PRN Vanesa Oreilly PA-C        prochlorperazine (COMPAZINE) injection 5 mg  5 mg Intravenous Q6H PRN Vanesa Oreilly PA-C   5 mg at 10/25/24 2100    Or    prochlorperazine (COMPAZINE) tablet 5 mg  5 mg Oral Q6H PRN Vanesa Oreilly PA-C           Cardiographics:    Telemetry monitoring demonstrates NSR with rates in the 90s per my personal review.    Imaging:  Results for orders placed or performed during the hospital encounter of 10/23/24   US Carotid Bilateral    Impression    IMPRESSION:  1.  Mild plaque formation, velocities consistent with less than 50% stenosis in the right internal carotid artery.  2.  Mild plaque formation, velocities consistent with less than 50% stenosis in the left internal carotid artery.  3.  Flow within the vertebral arteries is antegrade.   XR Chest Port 1 View    Impression    IMPRESSION: Endotracheal tube tip terminates in the mid thoracic trachea approximately 4 cm from the venice. Right IJ introducer tip terminates in the lower SVC. Mediastinal and left pleural drains are present. Epicardial pacing leads. No unexpected   radiopaque foreign bodies.    Left basilar atelectasis. No significant pleural effusion or pneumothorax. Nonenlarged heart with postoperative changes from CABG. Mitral annular calcifications. Prior lumbar vertebroplasties.   XR Chest Port 1 View    Impression    IMPRESSION:    *  Ill-defined hyperdensities overlie the midline upper abdomen and midline lower chest (arrows) and favored to be extrinsic to the patient, but mimics the appearance of a retrocardiac opacity. Recommend repeating portable chest radiograph after removal   of all overlying objects.  *  Left-sided chest tube with no pneumothorax. Possible trace left pleural effusion.  *  Mild cardiomegaly with CABG.  *  Right subclavian central catheter tip in the RA/SVC junction.       Labs, personally reviewed.  Hemoglobin   Date Value Ref Range Status   10/28/2024 9.4 (L) 11.7 - 15.7 g/dL Final   10/27/2024 9.3 (L) 11.7 - 15.7 g/dL Final   10/26/2024 8.9 (L) 11.7 - 15.7 g/dL Final     WBC Count   Date Value Ref Range Status   10/29/2024 11.0 4.0 - 11.0 10e3/uL Final   10/28/2024 14.5 (H) 4.0 - 11.0 10e3/uL Final   10/27/2024 17.9 (H) 4.0 - 11.0 10e3/uL Final     Platelet Count   Date Value Ref Range Status   10/29/2024 254 150 - 450 10e3/uL Final   10/28/2024 186 150 - 450 10e3/uL Final   10/27/2024 197 150 - 450 10e3/uL Final     Creatinine   Date Value Ref Range Status   10/30/2024 0.90 0.51 - 0.95 mg/dL Final   10/29/2024 0.76 0.51 - 0.95 mg/dL Final   10/28/2024 0.71 0.51 - 0.95 mg/dL Final     Potassium   Date Value Ref Range Status   10/30/2024 4.3 3.4 - 5.3 mmol/L Final   10/29/2024 4.0 3.4 - 5.3 mmol/L Final   10/29/2024 3.4 3.4 - 5.3 mmol/L Final   10/29/2021 4.3 3.5 - 5.0 mmol/L Final   10/20/2020 4.1 3.5 - 5.0 mmol/L Final   04/23/2019 5.2 (H) 3.5 - 5.0 mmol/L Final     Potassium POCT   Date Value Ref Range Status   10/25/2024 4.2 3.4 - 5.3 mmol/L Final   10/25/2024 4.6 3.4 - 5.3 mmol/L Final   10/25/2024 5.7 (H) 3.4 - 5.3 mmol/L Final     Magnesium   Date Value Ref Range Status   10/30/2024 2.0 1.7 - 2.3 mg/dL Final   10/29/2024 2.1 1.7 - 2.3 mg/dL Final   10/28/2024 2.3 1.7 - 2.3 mg/dL Final          I/O:  I/O last 3 completed shifts:  In: 1040 [P.O.:1040]  Out: 2280 [Urine:2280]       Physical Exam:    General:  Patient seen up in chair. NAD. Conversant. Pleasant  CV: RRR on monitor. 2+ peripheral pulses in all extremities. Mild edema. Incision C/D/I.  Pulm: Non-labored effort on room air.   Abd: Soft, NT, ND  Ext: Mild pedal edema, SCDs in place, warm, distal pulses intact  Neuro: CNs grossly intact      ASSESSMENT/PLAN:    Holly Beal is a 69 year old female with a history of CAD who is s/p CABGx5.    Principal Problem:    Coronary artery disease due to lipid rich plaque  Active Problems:    Calcium kidney stone    Kidney stone on left side    Asthma    Abnormal stress test    Status post coronary angiogram    Chest pain    Accelerating angina (H)    Mild intermittent asthma without complication    Diabetes mellitus type II, non insulin dependent (H)    Therapeutic opioid induced constipation    Physical deconditioning    Gastroesophageal reflux disease without esophagitis        NEURO:   - Scheduled Tylenol/lidocaine patches and PRN Tylenol/oxycodone/Toradol for pain  - PRN Atarax    CV:   - Pre-op EF 60-65%  - Normotensive  - Metoprolol 50mg BID increased to 75mg BID  - Amlodipine 2.5mg daily (radial artery graft protection)  - ASA 162mg daily  - Rosuvastatin 40mg daily  - Chest tubes and TPW removed 10/27    PULM:   - Extubated on POD0  - Maintaining oxygen saturations on room air  - Encourage pulmonary toilet    FEN/GI:  - Continue electrolyte replacement protocol  - Cardiac; ADAT  - Bowel regimen  - PTA Vit B12    RENAL:  - Adequate UOP/hr. Continue to monitor closely.  - Diuresis with 40mg IV Lasix daily    HEME:  - Acute blood loss anemia post-op.   - No bleeding concerns. Hep SQ, ASA    ID:  - [place] op ppx complete, afebrile.  - Leukocytosis and dirty UA 10/27. BID Macrobid started 10/28 for 5 days. WBC normalized. 10/29.    ENDO:   - SSI  - Restarted PTA Metformin (increased dose twice now from PTA)  - Appreciate hospitalist input on BG control    PPx:   - DVT: SCDs, SQ heparin TID, ambulation   - GI: Protonix  40mg IV/PO daily    DISPO:   - General telemetry status  - Medically Ready for Discharge: Anticipated Tomorrow             Patient discussed with Dr. Fuentes.        _______  Vanesa Oreilly PA-C  Cardiothoracic Surgery  [phone]

## 2024-10-30 NOTE — PROGRESS NOTES
CLINICAL NUTRITION SERVICES    Reviewed nutrition risk factors due to LOS. Pt is doing fairly well with her nutrition, % of meals ordered, she is not always ordering breakfast. She has some nausea.  Pt's  brought Premier protein drink for pt and she ordered chocolate Glucerna with her breakfast this am.  RD suggested trying a saltine cracker - let it melt on tongue when feeling nauseous.  Pt said she would try peanut butter and crackers while waiting for her breakfast.  Writer brought to her.  Pt received heart healthy ed with DM 10/28/24. RD will follow peripherally at this time, unless consulted.

## 2024-10-30 NOTE — PLAN OF CARE
"  Problem: Adult Inpatient Plan of Care  Goal: Plan of Care Review  Description: The Plan of Care Review/Shift note should be completed every shift.  The Outcome Evaluation is a brief statement about your assessment that the patient is improving, declining, or no change.  This information will be displayed automatically on your shift  note.  Outcome: Progressing  Goal: Patient-Specific Goal (Individualized)  Description: You can add care plan individualizations to a care plan. Examples of Individualization might be:  \"Parent requests to be called daily at 9am for status\", \"I have a hard time hearing out of my right ear\", or \"Do not touch me to wake me up as it startles  me\".  Outcome: Progressing  Goal: Absence of Hospital-Acquired Illness or Injury  Outcome: Progressing  Intervention: Identify and Manage Fall Risk  Recent Flowsheet Documentation  Taken 10/30/2024 1217 by Camila Fitch RN  Safety Promotion/Fall Prevention: activity supervised  Taken 10/30/2024 0900 by Camila Fitch RN  Safety Promotion/Fall Prevention: activity supervised  Intervention: Prevent Skin Injury  Recent Flowsheet Documentation  Taken 10/30/2024 1217 by Camila Fitch RN  Skin Protection: incontinence pads utilized  Taken 10/30/2024 0900 by Camila Fitch RN  Skin Protection: incontinence pads utilized  Intervention: Prevent and Manage VTE (Venous Thromboembolism) Risk  Recent Flowsheet Documentation  Taken 10/30/2024 1217 by Camila Fitch RN  VTE Prevention/Management: SCDs off (sequential compression devices)  Taken 10/30/2024 0900 by Camila Fitch RN  VTE Prevention/Management: SCDs off (sequential compression devices)  Intervention: Prevent Infection  Recent Flowsheet Documentation  Taken 10/30/2024 1217 by Camila Fitch RN  Infection Prevention: cohorting utilized  Taken 10/30/2024 0900 by Camila Fitch RN  Infection Prevention: cohorting utilized  Goal: Optimal Comfort and " Wellbeing  Outcome: Progressing  Intervention: Provide Person-Centered Care  Recent Flowsheet Documentation  Taken 10/30/2024 1217 by Camila Fitch RN  Trust Relationship/Rapport: care explained  Taken 10/30/2024 0900 by Camila Fitch RN  Trust Relationship/Rapport: care explained  Goal: Readiness for Transition of Care  Outcome: Progressing     Problem: Pain Acute  Goal: Optimal Pain Control and Function  Outcome: Progressing  Intervention: Optimize Psychosocial Wellbeing  Recent Flowsheet Documentation  Taken 10/30/2024 1217 by Camila Fitch RN  Supportive Measures: active listening utilized  Taken 10/30/2024 0900 by Camila Fitch RN  Supportive Measures: active listening utilized  Intervention: Prevent or Manage Pain  Recent Flowsheet Documentation  Taken 10/30/2024 1217 by Camila Fitch RN  Sensory Stimulation Regulation: care clustered  Medication Review/Management: medications reviewed  Taken 10/30/2024 0900 by Camila Fitch RN  Sensory Stimulation Regulation: care clustered  Medication Review/Management: medications reviewed     Problem: Cardiovascular Surgery  Goal: Improved Activity Tolerance  Outcome: Progressing  Intervention: Optimize Tolerance for Activity  Recent Flowsheet Documentation  Taken 10/30/2024 1217 by Camila Fitch RN  Environmental Support: calm environment promoted  Taken 10/30/2024 0900 by Camila Fitch RN  Environmental Support: calm environment promoted  Goal: Optimal Coping with Heart Surgery  Outcome: Progressing  Intervention: Support Psychosocial Response to Surgery  Recent Flowsheet Documentation  Taken 10/30/2024 1217 by Camila Fitch RN  Supportive Measures: active listening utilized  Taken 10/30/2024 0900 by Camila Fitch RN  Supportive Measures: active listening utilized  Goal: Absence of Bleeding  Outcome: Progressing  Intervention: Monitor and Manage Bleeding  Recent Flowsheet Documentation  Taken  10/30/2024 1217 by Camila Fitch RN  Bleeding Management: other (see comments)  Taken 10/30/2024 0900 by Camila Fitch RN  Bleeding Management: other (see comments)  Goal: Effective Bowel Elimination  Outcome: Progressing  Goal: Effective Cardiac Function  Outcome: Progressing  Goal: Optimal Cerebral Tissue Perfusion  Outcome: Progressing  Intervention: Protect and Optimize Cerebral Perfusion  Recent Flowsheet Documentation  Taken 10/30/2024 1217 by Camila Fitch RN  Sensory Stimulation Regulation: care clustered  Taken 10/30/2024 0900 by Camila Fitch RN  Sensory Stimulation Regulation: care clustered  Goal: Fluid and Electrolyte Balance  Outcome: Progressing  Intervention: Monitor and Manage Fluid and Electrolyte Balance  Recent Flowsheet Documentation  Taken 10/30/2024 1217 by Camila Fitch RN  Fluid/Electrolyte Management: fluids restricted  Taken 10/30/2024 0900 by Camila Fitch RN  Fluid/Electrolyte Management: fluids restricted  Goal: Blood Glucose Level Within Targeted Range  Outcome: Progressing  Goal: Absence of Infection Signs and Symptoms  Outcome: Progressing  Intervention: Prevent or Manage Infection  Recent Flowsheet Documentation  Taken 10/30/2024 1217 by Camila Fitch RN  Infection Prevention: cohorting utilized  Taken 10/30/2024 0900 by Camila Fitch RN  Infection Prevention: cohorting utilized  Goal: Anesthesia/Sedation Recovery  Outcome: Progressing  Intervention: Optimize Anesthesia Recovery  Recent Flowsheet Documentation  Taken 10/30/2024 1217 by Camila Fitch RN  Safety Promotion/Fall Prevention: activity supervised  Administration (IS): self-administered  Reorientation Measures: clock in view  Patient Tolerance (IS): fair  Taken 10/30/2024 0900 by Camila Fitch RN  Safety Promotion/Fall Prevention: activity supervised  Administration (IS): self-administered  Reorientation Measures: clock in view  Level Incentive Spirometer  (mL): 1000  Incentive Spirometer Predicted Level (mL): 2200  Number of Repetitions (IS): 5  Patient Tolerance (IS): fair  Goal: Acceptable Pain Control  Outcome: Progressing  Goal: Nausea and Vomiting Relief  Outcome: Progressing  Goal: Effective Urinary Elimination  Outcome: Progressing  Goal: Effective Oxygenation and Ventilation  Outcome: Progressing  Intervention: Promote Airway Secretion Clearance  Recent Flowsheet Documentation  Taken 10/30/2024 1217 by Camila Fitch RN  Administration (IS): self-administered  Cough And Deep Breathing: done independently per patient  Patient Tolerance (IS): fair  Taken 10/30/2024 0900 by Camila Fitch RN  Administration (IS): self-administered  Level Incentive Spirometer (mL): 1000  Cough And Deep Breathing: done independently per patient  Incentive Spirometer Predicted Level (mL): 2200  Number of Repetitions (IS): 5  Patient Tolerance (IS): fair  Intervention: Optimize Oxygenation and Ventilation  Recent Flowsheet Documentation  Taken 10/30/2024 1217 by Camila Fitch RN  Chest Tube Safety: all connections secured  Taken 10/30/2024 0900 by Camila Fitch RN  Chest Tube Safety: all connections secured     Problem: Comorbidity Management  Goal: Maintenance of Asthma Control  Outcome: Progressing  Intervention: Maintain Asthma Symptom Control  Recent Flowsheet Documentation  Taken 10/30/2024 1217 by Camila Fitch RN  Medication Review/Management: medications reviewed  Taken 10/30/2024 0900 by Camila Fitch RN  Medication Review/Management: medications reviewed  Goal: Blood Glucose Levels Within Targeted Range  Outcome: Progressing  Intervention: Monitor and Manage Glycemia  Recent Flowsheet Documentation  Taken 10/30/2024 1217 by Camila Fitch RN  Medication Review/Management: medications reviewed  Taken 10/30/2024 0900 by Camila Fitch RN  Medication Review/Management: medications reviewed  Goal: Blood Pressure in Desired  Range  Outcome: Progressing  Intervention: Maintain Blood Pressure Management  Recent Flowsheet Documentation  Taken 10/30/2024 1217 by Camila Fitch RN  Medication Review/Management: medications reviewed  Taken 10/30/2024 0900 by Camila Fitch RN  Medication Review/Management: medications reviewed     Problem: Anxiety Signs/Symptoms  Goal: Optimized Energy Level (Anxiety Signs/Symptoms)  Outcome: Progressing  Goal: Optimized Cognitive Function (Anxiety Signs/Symptoms)  Outcome: Progressing  Goal: Improved Mood Symptoms (Anxiety Signs/Symptoms)  Outcome: Progressing  Intervention: Optimize Emotion and Mood  Recent Flowsheet Documentation  Taken 10/30/2024 1217 by Camila Fitch RN  Supportive Measures: active listening utilized  Taken 10/30/2024 0900 by Camila Fitch RN  Supportive Measures: active listening utilized  Goal: Improved Sleep (Anxiety Signs/Symptoms)  Outcome: Progressing  Goal: Enhanced Social, Occupational or Functional Skills (Anxiety Signs/Symptoms)  Outcome: Progressing  Intervention: Promote Social, Occupational and Functional Ability  Recent Flowsheet Documentation  Taken 10/30/2024 1217 by Camila Fitch RN  Trust Relationship/Rapport: care explained  Taken 10/30/2024 0900 by Camila Fitch RN  Trust Relationship/Rapport: care explained  Goal: Improved Somatic Symptoms (Anxiety Signs/Symptoms)  Outcome: Progressing     Problem: Risk for Delirium  Goal: Optimal Coping  Outcome: Progressing  Intervention: Optimize Psychosocial Adjustment to Delirium  Recent Flowsheet Documentation  Taken 10/30/2024 1217 by Camila Fitch RN  Supportive Measures: active listening utilized  Taken 10/30/2024 0900 by Camila Fitch RN  Supportive Measures: active listening utilized  Goal: Improved Behavioral Control  Outcome: Progressing  Intervention: Prevent and Manage Agitation  Recent Flowsheet Documentation  Taken 10/30/2024 1217 by Camila Fitch  RN  Environment Familiarity/Consistency: daily routine followed  Taken 10/30/2024 0900 by Camila Fitch RN  Environment Familiarity/Consistency: daily routine followed  Intervention: Minimize Safety Risk  Recent Flowsheet Documentation  Taken 10/30/2024 1217 by Camila Fitch RN  Enhanced Safety Measures: review medications for side effects with activity  Trust Relationship/Rapport: care explained  Taken 10/30/2024 0900 by Camila Fitch RN  Enhanced Safety Measures: review medications for side effects with activity  Trust Relationship/Rapport: care explained  Goal: Improved Attention and Thought Clarity  Outcome: Progressing  Intervention: Maximize Cognitive Function  Recent Flowsheet Documentation  Taken 10/30/2024 1217 by Camila Fitch RN  Sensory Stimulation Regulation: care clustered  Reorientation Measures: clock in view  Taken 10/30/2024 0900 by Camila Fitch RN  Sensory Stimulation Regulation: care clustered  Reorientation Measures: clock in view  Goal: Improved Sleep  Outcome: Progressing     Problem: Fall Injury Risk  Goal: Absence of Fall and Fall-Related Injury  Outcome: Progressing  Intervention: Identify and Manage Contributors  Recent Flowsheet Documentation  Taken 10/30/2024 1217 by Camila Fitch RN  Medication Review/Management: medications reviewed  Taken 10/30/2024 0900 by Camila Fitch RN  Medication Review/Management: medications reviewed  Intervention: Promote Injury-Free Environment  Recent Flowsheet Documentation  Taken 10/30/2024 1217 by Camila Fitch RN  Safety Promotion/Fall Prevention: activity supervised  Taken 10/30/2024 0900 by Camila Fitch RN  Safety Promotion/Fall Prevention: activity supervised   Goal Outcome Evaluation:       CABG POD #5. Rates pain 4 and 2 incisional pain and upper back between shoulder pain. Lidocaine cream ordered per her request. Had BM x3 this shift. SR on the monitor and VSS. Poor appetite.  "SBA/Assist of one to bathroom, tolerates this well. K,MG,Phos protocols in place, recheck in morning. IV lasix, good urine output. Patient requests to take shower this evening when \"she has more energy.\" Family at bedside. Continue plan of care.                  "

## 2024-10-30 NOTE — PROGRESS NOTES
[facility]    Medicine Progress Note - Hospitalist Service    Date of Admission:  10/23/2024    Assessment & Plan       Holly Beal is a 69 year old female with HTN, HL, DM2, accelerating angina who had an abnormal stress test leading to a coronary angiogram on 10/23 which showed multi-vessel CAD.  Patient is now status post CABG x 5.  Doing fairly well postoperatively.  [facility] asked to help manage diabetes. Hospital Day: 7       Multi-vessel CAD  -- Coronary angiogram completed on 10/23 which showed multivessel disease  -- S/p CABG x5 on 10/25 by Dr. Fuentes  -- , Home Crestor increased to 40mg every day  -- Postop cares per CV Surgery  - Started on metoprolol  - On IV Lasix per CV surgery  -- Chest tubes and Bojorquez catheter out     DM 2: A1c: 7.1%.   - Increase PTA metformin to 1500 mg daily  - Continue with Novolog sliding scale.     UTI  - Surgeon started her on Macrobid yesterday, culture with mixed ned.      Acute blood loss anemia  -- Monitor CBC  - Hemoglobin laure 8.9, has not needed transfusion     Leukocytosis  -- Likely reactive.  Resolved now     Essential HTN  -- Metoprolol, Norvasc  -- Hydralazine iv prn per CV surgery     HLD  -- Continue Increased Crestor at 40mg every day with goal of LDL<70     Mild intermittent asthma w/o acute exacerbation  -- Albuterol inhaler prn     GERD  -- PTA Omeprazole     Anxiety d/o  -- PTA Alprazolam. Prn Atarax    Constipation  -resolved, stooling well now          Diet: Low Saturated Fat Na <2400 mg    DVT Prophylaxis: Heparin SQ  Bojorquez Catheter: Not present  Lines: None     Cardiac Monitoring: ACTIVE order. Indication: Open heart surgery (72 hours)  Code Status: Full Code      Clinically Significant Risk Factors          # Hypochloremia: Lowest Cl = 96 mmol/L in last 2 days, will monitor as appropriate  # Hypocalcemia: Lowest iCa = 4.2 mg/dL in last 2 days, will monitor and replace as appropriate                  # DMII: A1C = 7.1 %  (Ref range: <5.7 %) within past 6 months       # Financial/Environmental Concerns: none  # Asthma: noted on problem list  # History of CABG: noted on surgical history       Disposition Plan     Medically Ready for Discharge: Anticipated Tomorrow             Susie Leroy MD  Hospitalist Service  [facility]  Securely message with Language Learning Class (more info)  Text page via [employer] Paging/Directory   ______________________________________________________________________    Interval History   Patient reports having back pain in between her scapular. We discussed that it is most likely MSK-related pain. She is agreeable to try lidocaine cream. She is hoping to go home tomorrow.     Physical Exam   Vital Signs: Temp: 98.2  F (36.8  C) Temp src: Oral BP: 134/65 Pulse: 85   Resp: 20 SpO2: 97 % O2 Device: None (Room air)    Weight: 153 lbs 0 oz    General appearance: not in acute distress  HEENT: PERRL, EOMI  Lungs: Clear breath sounds in bilateral lung fields  Cardiovascular: Regular rate and rhythm, normal S1-S2  Abdomen: Soft, non tender, no distension  Musculoskeletal: No joint swelling  Skin: No rash and no edema  Neurology: AAO ×3.  Cranial nerves II - XII normal.  Normal muscle strength in all four extremities.     Medical Decision Making       45 MINUTES SPENT BY ME on the date of service doing chart review, history, exam, documentation & further activities per the note.      Data     I have personally reviewed the following data over the past 24 hrs:    N/A  \   N/A   / N/A     140 98 18.7 /  168 (H)   4.3 32 (H) 0.90 \       Imaging results reviewed over the past 24 hrs:   No results found for this or any previous visit (from the past 24 hours).

## 2024-10-30 NOTE — DISCHARGE SUMMARY
Cardiovascular Surgery Discharge Summary    Primary Care Physician:  Cj Perez  Discharge Provider: Iveth Jin PA-C  Admission Date: 10/23/2024  Admission Diagnoses: Abnormal stress test [R94.39]  Discharge Date: 10/31/2024   Disposition: Home   Condition at Discharge: Good  Code Status: Full Code     Principal Diagnosis:   Coronary artery disease due to lipid rich plaque s/p CABGx5    Discharge Diagnoses:    Active Problems:      Patient Active Problem List   Diagnosis    Calcium kidney stone    Kidney stone on left side    Asthma    Abnormal stress test    Status post coronary angiogram    Chest pain    Accelerating angina (H)    Mild intermittent asthma without complication    Coronary artery disease due to lipid rich plaque    Diabetes mellitus type II, non insulin dependent (H)    Therapeutic opioid induced constipation    Physical deconditioning    Gastroesophageal reflux disease without esophagitis         Consult/s: Dietary, critical care medicine    Surgery: CAB x5/RA  10/25/2024 with Dr. Fuentes   Median sternotomy   Take down of the left internal mammary artery    Endoscopic greater saphenous vein procurement from both     thighs    Endoscopic radial artery procurement from the left forearm    Epiaortic ultrasound of the ascending aorta    Placement on central cardiopulmonary bypass    Quintuple vessel coronary artery bypass grafting;   -  Left internal mammary artery to the left anterior descending coronary artery  -  Left radial artery to the obtuse marginal 1  -  Separate reversed saphenous vein grafts to left anterior descending diagonal branch, the obtuse marginal 3 and the right posterior descending coronary arteries..    Placement of temporary atrial and ventricular pacing wires    FINDINGS AT OPERATION:  Her ascending aorta was normal in size and free of any plaque seen on epiaortic ultrasound.  Her left ventricular function was excellent.  The left internal mammary artery was a 2  mm in diameter conduit of excellent quality.  The left radial artery was a 2.5 mm in diameter conduit of excellent quality.  The reversed saphenous vein graft measured 4 to 5 mm in diameter and was of good quality.  The right posterior descending coronary artery was a 2 mm in diameter target vessel, an excellent vessel for bypass grafting.  The obtuse marginal 3 was a 1.5 mm in diameter target vessel, a good vessel for bypass grafting.  The obtuse marginal 1 was a 2 mm in diameter target vessel, an excellent vessel for bypass grafting.  The left anterior descending diagonal branch was a 2 mm in diameter target vessel, an excellent vessel for bypass grafting.  The left anterior descending coronary artery in its apical third was a 1.5 mm in diameter target vessel, a good vessel for bypass grafting.  The patient weaned off cardiopulmonary bypass without difficulty.        Discharge Medications:      Review of your medicines        START taking        Dose / Directions   acetaminophen 500 MG tablet  Commonly known as: TYLENOL  Used for: S/P CABG (coronary artery bypass graft)      Dose: 500-1,000 mg  Take 1-2 tablets (500-1,000 mg) by mouth every 6 hours as needed for mild pain.  Refills: 0     amLODIPine 2.5 MG tablet  Commonly known as: NORVASC  Used for: S/P CABG (coronary artery bypass graft)      Dose: 2.5 mg  Take 1 tablet (2.5 mg) by mouth daily.  Quantity: 30 tablet  Refills: 5     aspirin 81 MG chewable tablet  Commonly known as: ASA  Used for: S/P CABG (coronary artery bypass graft)  Replaces: aspirin 81 MG EC tablet      Dose: 162 mg  Take 2 tablets (162 mg) by mouth daily.  Quantity: 60 tablet  Refills: 3     metoprolol tartrate 100 MG tablet  Commonly known as: LOPRESSOR  Used for: S/P CABG (coronary artery bypass graft)      Dose: 100 mg  Take 1 tablet (100 mg) by mouth 2 times daily.  Quantity: 60 tablet  Refills: 3     nitroFURantoin macrocrystal-monohydrate 100 MG capsule  Commonly known as:  MACROBID  Indication: Urinary Tract Infection  Used for: S/P CABG (coronary artery bypass graft)      Dose: 100 mg  Take 1 capsule (100 mg) by mouth every 12 hours.  Quantity: 4 capsule  Refills: 0            CONTINUE these medicines which may have CHANGED, or have new prescriptions. If we are uncertain of the size of tablets/capsules you have at home, strength may be listed as something that might have changed.        Dose / Directions   metFORMIN 750 MG 24 hr tablet  Commonly known as: GLUCOPHAGE-XR  This may have changed:   medication strength  how much to take      Dose: 1,500 mg  Take 2 tablets (1,500 mg) by mouth daily (with dinner).  Quantity: 60 tablet  Refills: 3     rosuvastatin 40 MG tablet  Commonly known as: CRESTOR  This may have changed:   medication strength  how much to take  Used for: S/P CABG (coronary artery bypass graft)      Dose: 40 mg  Take 1 tablet (40 mg) by mouth daily.  Quantity: 30 tablet  Refills: 3            CONTINUE these medicines which have NOT CHANGED        Dose / Directions   albuterol 108 (90 Base) MCG/ACT inhaler  Commonly known as: PROAIR HFA/PROVENTIL HFA/VENTOLIN HFA      Dose: 2 puff  [ALBUTEROL (PROVENTIL HFA;VENTOLIN HFA) 90 MCG/ACTUATION INHALER] Inhale 2 puffs every 6 (six) hours as needed for wheezing or shortness of breath.  Refills: 0     ALPRAZolam 0.5 MG tablet  Commonly known as: XANAX      Dose: 0.5 mg  [ALPRAZOLAM (XANAX) 0.5 MG TABLET] Take 0.5 mg by mouth 3 (three) times a day as needed for anxiety.  Refills: 0     AZO-CRANBERRY PO      Dose: 1 tablet  Take 1 tablet by mouth three times a week.  Refills: 0     BIOTIN-KERATIN-ALPHA LIPOIC AC PO      Dose: 1 capsule  Take 1 capsule by mouth daily.  Refills: 0     cephALEXin 500 MG capsule  Commonly known as: KEFLEX      Dose: 500 mg  Take 500 mg by mouth daily as needed (as needed for kidney stones).  Refills: 0     FLUTICASONE PROPIONATE (NASAL) 50 MCG/ACT Susp      Dose: 2 spray  [FLUTICASONE (FLONASE) 50  MCG/ACTUATION NASAL SPRAY] 2 sprays into each nostril daily as needed for rhinitis.  Refills: 0     omeprazole 20 MG DR capsule  Commonly known as: PriLOSEC      Dose: 20 mg  [OMEPRAZOLE (PRILOSEC) 20 MG CAPSULE] Take 20 mg by mouth every evening.  Refills: 0     PROBIOTIC 10 ULTRA STRENGTH PO      Dose: 1 capsule  Take 1 capsule by mouth three times a week. Alternates  with AZO Cranberry.  Refills: 0     vitamin B-12 1000 MCG tablet  Commonly known as: CYANOCOBALAMIN      Dose: 1,000 mcg  [CYANOCOBALAMIN 1000 MCG TABLET] Take 1,000 mcg by mouth daily.  Refills: 0            STOP taking      aspirin 81 MG EC tablet  Commonly known as: ASA  Replaced by: aspirin 81 MG chewable tablet        lisinopril 10 MG tablet  Commonly known as: ZESTRIL        nitroGLYcerin 0.4 MG sublingual tablet  Commonly known as: NITROSTAT                  Where to get your medicines        These medications were sent to [facility] [address] [address]      Phone: [phone]   amLODIPine 2.5 MG tablet  aspirin 81 MG chewable tablet  metFORMIN 750 MG 24 hr tablet  metoprolol tartrate 100 MG tablet  nitroFURantoin macrocrystal-monohydrate 100 MG capsule  rosuvastatin 40 MG tablet       Some of these will need a paper prescription and others can be bought over the counter. Ask your nurse if you have questions.    You don't need a prescription for these medications  acetaminophen 500 MG tablet         Discharge Instructions:    Follow up appointment with Primary Care Physician: Cj ePrez within 7 days of discharge.  Follow up appointment with Specialist:    Follow with CV Surgery as scheduled.11/19 at 12:30 pm   Follow-up with cardiology as scheduled with Dr Godwin 12/13 at 3:50 pm    Diet: Cardiac    Activity/Restrictions: As tolerated with sternal precautions in mind (see below). No driving for 4 weeks or while on pain medication.  "    - Shower and wash your incisions daily with soap and water. No tub baths/hot tubs for 4 weeks. An antibacterial soap such as Dial or Safeguard is recommended.    - Check your incisions every day. If you notice any redness, drainage, or anything unusual, please call the surgeons office.    - No driving for 4 weeks after surgery or while on pain medication     - Do not lift anything more than 10 pounds for 8 weeks after surgery. After 8 weeks, advance lifting gradually as tolerated.    - You may have watery drainage from your chest tube site for 2-3 weeks after surgery. Your may cover with a Band-Aid to protect your clothing. Remove the Band-Aid every day and wash the site.    - If you have a leg lesion, you may have swelling for 2-3 months. Elevate your leg any time you are not walking.    - If you feel any \"popping\" or \"clicking\" sensations in your chest, your arms are out too far or you are putting too much weight into arm movements. Do not reach over your head or out to the side to pull something. Do not do any arm exercises or use any exercise equipment that involves arm movement. If you feel your sternum moving, call the surgeon's office.    - Increase your daily activity as explained by Cardiac Rehab. You are encouraged to enroll in an Outpatient Cardiac Rehab Program.    - No active sports using your upper arms for 3 months. This includes fishing, hunting, bowling, swimming, tennis or golf.    - No physical activity such as cutting the grass, raking, vacuuming, changing sheets on your bed, snow shoveling, or using a  for 3 months.    - Use incentive spirometer 6-8 times per day for 2 weeks.       Hospital Summary:   Holly Beal is a 69 year old female who was admitted to [facility] on 10/23/2024 following an abnormal outpatient coronary angiogram demonstrating severe multi-vessel coronary artery disease. She was referred to CV surgery for evaluation for possible urgent coronary artery " "revascularization.     Patient was deemed a candidate for coronary artery bypass surgery, and was taken to the operating room on 10/25/2024 where patient underwent five vessel coronary artery bypass, endoscopic left radial artery harvest, and endoscopic vein harvest from the bilateral legs. Surgery was uneventful and patient was brought to the ICU post-operatively. She was extubated on POD#0 and weaned from pressors. Patient was awake and alert, afebrile, and with stable vitals. Insulin drip was discontinued and she was transitioned to a sliding scale. She was transferred to general telemetry status on POD#1 where patient has had return of bowel function, is maintaining oxygen saturations on room air, had her chest tubes removed, and has no complaints of chest pain or shortness of breath. On 10/31/24, patient was stable enough to be discharged to home.    Due to high blood sugars inpatient, her Metformin was increased from her PTA dose of 750mg daily to 1500 mg daily.    Of note, She is below preop weight at discharge therefore no further diuresis needed.    She is on amlodipine for radial artery graft protection. This should be maintained for at least 6 months post-op.      Vital signs:  Temp: 98.7  F (37.1  C) Temp src: Oral BP: 135/67 Pulse: 96   Resp: 18 SpO2: 92 % O2 Device: None (Room air) Oxygen Delivery: 1 LPM Height: 167.6 cm (5' 6\") Weight: 69.4 kg (153 lb 1.6 oz)  Estimated body mass index is 24.71 kg/m  as calculated from the following:    Height as of this encounter: 1.676 m (5' 6\").    Weight as of this encounter: 69.4 kg (153 lb 1.6 oz).        Physical Exam:    Pertinent exam findings on day of discharge include:  Gen: Seen up in chair. NAD. Pleasant and conversant.   CV: RRR on monitor. No edema.  Pulm: Non-labored breathing on room air.  Abd: Soft, non-tender, non-distended  Neuro: CNs grossly intact  Inc: C/D/I      Iveth Jin PA-C  [facility]  Vocera      "

## 2024-10-30 NOTE — PLAN OF CARE
sc  Patient Name: Holly Beal   MRN: [id number]   Date of Admission: 10/23/2024    Procedure: Procedure(s):  CORONARY ARTERY BYPASS GRAFT TIMES FIVE, LEFT INTERNAL MAMMARY ARTERY HARVEST, LEFT AND RIGHT LEG ENDOSCOPIC VESSEL PROCUREMENT,  LEFT RADIAL ARTERY HARVEST  EPIAORTIC ULTRASOUND, ANESTHESIA TRANSESOPHAGEAL ECHOCARDIOGRAM    Post Op day #:5    Subjective (Patient focus/Primary Problem for shift): Promoting rest          Pain Goal 0/10 Pain Rating 0/10           Pain Medication/ Regime effective to reduce patient pain scheduled tylenol    Objective (Physical assessment):           Rhythm:  sinus rhythm with bundle branch block            Bowel Activity: no if Yes indicate when:           Bowel Medications: yes            Incision: healing well          Incentive Spirometry Q 1-2 hour when awake:  yes Volume: 1250          Epicardial Pacing Wires:  no            Patient Activity:           Up to chair for meals: yes          Ambulation with RN x2 (Not including CR): not applicable            Is patient in home clothes:no             Chest Tubes   Pleural: no Draining: not applicable               Suction: not applicable              Mediastinal: no Draining: not applicable               Suction: not applicable   Dressing Change Daily:no If No, why?                     Urinary Catheter: no           Preventative WOC consult (need MD order): no       Assessment (Nursing primary shift focus): VSS. Atarax given for sleep with not much luck. Generalized +1 edema. No nausea today. K and Mg protocol ran, recheck next AM.    Plan (Patient Care Plan/focus): Ambulate. Shower. Encourage more IS and flutter valve use. Monitor rhythm.      Alycia Calzada RN   10/30/2024   6:27 AM

## 2024-10-31 ENCOUNTER — APPOINTMENT (OUTPATIENT)
Dept: OCCUPATIONAL THERAPY | Facility: HOSPITAL | Age: 69
DRG: 234 | End: 2024-10-31
Attending: STUDENT IN AN ORGANIZED HEALTH CARE EDUCATION/TRAINING PROGRAM
Payer: COMMERCIAL

## 2024-10-31 VITALS
WEIGHT: 153.1 LBS | HEART RATE: 96 BPM | DIASTOLIC BLOOD PRESSURE: 67 MMHG | TEMPERATURE: 98.7 F | HEIGHT: 66 IN | OXYGEN SATURATION: 92 % | RESPIRATION RATE: 18 BRPM | BODY MASS INDEX: 24.6 KG/M2 | SYSTOLIC BLOOD PRESSURE: 135 MMHG

## 2024-10-31 LAB
ANION GAP SERPL CALCULATED.3IONS-SCNC: 13 MMOL/L (ref 7–15)
BUN SERPL-MCNC: 18.1 MG/DL (ref 8–23)
CA-I BLD-MCNC: 5 MG/DL (ref 4.4–5.2)
CALCIUM SERPL-MCNC: 10 MG/DL (ref 8.8–10.4)
CHLORIDE SERPL-SCNC: 97 MMOL/L (ref 98–107)
CREAT SERPL-MCNC: 0.82 MG/DL (ref 0.51–0.95)
EGFRCR SERPLBLD CKD-EPI 2021: 77 ML/MIN/1.73M2
GLUCOSE BLDC GLUCOMTR-MCNC: 156 MG/DL (ref 70–99)
GLUCOSE SERPL-MCNC: 232 MG/DL (ref 70–99)
HCO3 SERPL-SCNC: 30 MMOL/L (ref 22–29)
MAGNESIUM SERPL-MCNC: 1.8 MG/DL (ref 1.7–2.3)
PHOSPHATE SERPL-MCNC: 2.7 MG/DL (ref 2.5–4.5)
POTASSIUM SERPL-SCNC: 4.5 MMOL/L (ref 3.4–5.3)
SODIUM SERPL-SCNC: 140 MMOL/L (ref 135–145)

## 2024-10-31 PROCEDURE — 84100 ASSAY OF PHOSPHORUS: CPT | Performed by: STUDENT IN AN ORGANIZED HEALTH CARE EDUCATION/TRAINING PROGRAM

## 2024-10-31 PROCEDURE — 250N000013 HC RX MED GY IP 250 OP 250 PS 637: Performed by: PHYSICIAN ASSISTANT

## 2024-10-31 PROCEDURE — 97535 SELF CARE MNGMENT TRAINING: CPT | Mod: GO

## 2024-10-31 PROCEDURE — 36415 COLL VENOUS BLD VENIPUNCTURE: CPT | Performed by: PHYSICIAN ASSISTANT

## 2024-10-31 PROCEDURE — 250N000011 HC RX IP 250 OP 636: Performed by: PHYSICIAN ASSISTANT

## 2024-10-31 PROCEDURE — 80048 BASIC METABOLIC PNL TOTAL CA: CPT | Performed by: PHYSICIAN ASSISTANT

## 2024-10-31 PROCEDURE — 99232 SBSQ HOSP IP/OBS MODERATE 35: CPT | Performed by: INTERNAL MEDICINE

## 2024-10-31 PROCEDURE — 82947 ASSAY GLUCOSE BLOOD QUANT: CPT | Performed by: PHYSICIAN ASSISTANT

## 2024-10-31 PROCEDURE — 250N000013 HC RX MED GY IP 250 OP 250 PS 637: Performed by: STUDENT IN AN ORGANIZED HEALTH CARE EDUCATION/TRAINING PROGRAM

## 2024-10-31 PROCEDURE — 82330 ASSAY OF CALCIUM: CPT | Performed by: PHYSICIAN ASSISTANT

## 2024-10-31 PROCEDURE — 83735 ASSAY OF MAGNESIUM: CPT | Performed by: STUDENT IN AN ORGANIZED HEALTH CARE EDUCATION/TRAINING PROGRAM

## 2024-10-31 PROCEDURE — 250N000013 HC RX MED GY IP 250 OP 250 PS 637: Performed by: HOSPITALIST

## 2024-10-31 RX ORDER — METFORMIN HYDROCHLORIDE 750 MG/1
1500 TABLET, EXTENDED RELEASE ORAL
Qty: 60 TABLET | Refills: 3 | Status: SHIPPED | OUTPATIENT
Start: 2024-10-31

## 2024-10-31 RX ORDER — METOPROLOL TARTRATE 25 MG/1
100 TABLET, FILM COATED ORAL 2 TIMES DAILY
Status: DISCONTINUED | OUTPATIENT
Start: 2024-10-31 | End: 2024-10-31 | Stop reason: HOSPADM

## 2024-10-31 RX ORDER — ACETAMINOPHEN 500 MG
500-1000 TABLET ORAL EVERY 6 HOURS PRN
COMMUNITY
Start: 2024-10-31

## 2024-10-31 RX ORDER — NITROFURANTOIN 25; 75 MG/1; MG/1
100 CAPSULE ORAL EVERY 12 HOURS
Qty: 4 CAPSULE | Refills: 0 | Status: SHIPPED | OUTPATIENT
Start: 2024-10-31

## 2024-10-31 RX ORDER — AMLODIPINE BESYLATE 2.5 MG/1
2.5 TABLET ORAL DAILY
Qty: 30 TABLET | Refills: 5 | Status: SHIPPED | OUTPATIENT
Start: 2024-10-31

## 2024-10-31 RX ORDER — ROSUVASTATIN CALCIUM 40 MG/1
40 TABLET, COATED ORAL DAILY
Qty: 30 TABLET | Refills: 3 | Status: SHIPPED | OUTPATIENT
Start: 2024-10-31

## 2024-10-31 RX ORDER — ASPIRIN 81 MG/1
162 TABLET, CHEWABLE ORAL DAILY
Qty: 60 TABLET | Refills: 3 | Status: SHIPPED | OUTPATIENT
Start: 2024-10-31

## 2024-10-31 RX ORDER — METOPROLOL TARTRATE 100 MG/1
100 TABLET ORAL 2 TIMES DAILY
Qty: 60 TABLET | Refills: 3 | Status: SHIPPED | OUTPATIENT
Start: 2024-10-31

## 2024-10-31 RX ORDER — MAGNESIUM OXIDE 400 MG/1
400 TABLET ORAL EVERY 4 HOURS
Status: DISCONTINUED | OUTPATIENT
Start: 2024-10-31 | End: 2024-10-31 | Stop reason: HOSPADM

## 2024-10-31 RX ADMIN — ACETAMINOPHEN 975 MG: 325 TABLET ORAL at 08:33

## 2024-10-31 RX ADMIN — CYANOCOBALAMIN TAB 1000 MCG 1000 MCG: 1000 TAB at 08:37

## 2024-10-31 RX ADMIN — AMLODIPINE BESYLATE 2.5 MG: 2.5 TABLET ORAL at 08:39

## 2024-10-31 RX ADMIN — HEPARIN SODIUM 5000 UNITS: 10000 INJECTION, SOLUTION INTRAVENOUS; SUBCUTANEOUS at 03:50

## 2024-10-31 RX ADMIN — ZOLPIDEM TARTRATE 2.5 MG: 5 TABLET, FILM COATED ORAL at 01:15

## 2024-10-31 RX ADMIN — LIDOCAINE: 40 CREAM TOPICAL at 08:40

## 2024-10-31 RX ADMIN — METOPROLOL TARTRATE 12.5 MG: 25 TABLET, FILM COATED ORAL at 03:50

## 2024-10-31 RX ADMIN — METOPROLOL TARTRATE 100 MG: 25 TABLET, FILM COATED ORAL at 08:51

## 2024-10-31 RX ADMIN — POTASSIUM & SODIUM PHOSPHATES POWDER PACK 280-160-250 MG 1 PACKET: 280-160-250 PACK at 08:36

## 2024-10-31 RX ADMIN — NITROFURANTOIN MONOHYDRATE/MACROCRYSTALS 100 MG: 75; 25 CAPSULE ORAL at 08:41

## 2024-10-31 RX ADMIN — MAGNESIUM OXIDE TAB 400 MG (241.3 MG ELEMENTAL MG) 400 MG: 400 (241.3 MG) TAB at 08:36

## 2024-10-31 RX ADMIN — FUROSEMIDE 40 MG: 10 INJECTION, SOLUTION INTRAVENOUS at 08:39

## 2024-10-31 RX ADMIN — ASPIRIN 81 MG CHEWABLE TABLET 162 MG: 81 TABLET CHEWABLE at 08:36

## 2024-10-31 RX ADMIN — INSULIN ASPART 1 UNITS: 100 INJECTION, SOLUTION INTRAVENOUS; SUBCUTANEOUS at 08:38

## 2024-10-31 RX ADMIN — ONDANSETRON 4 MG: 2 INJECTION INTRAMUSCULAR; INTRAVENOUS at 03:49

## 2024-10-31 ASSESSMENT — ACTIVITIES OF DAILY LIVING (ADL)
ADLS_ACUITY_SCORE: 0

## 2024-10-31 NOTE — PROGRESS NOTES
sc  Patient Name: Holly Beal   MRN: [id number]   Date of Admission: 10/23/2024    Procedure: Procedure(s):  CORONARY ARTERY BYPASS GRAFT TIMES FIVE, LEFT INTERNAL MAMMARY ARTERY HARVEST, LEFT AND RIGHT LEG ENDOSCOPIC VESSEL PROCUREMENT,  LEFT RADIAL ARTERY HARVEST  EPIAORTIC ULTRASOUND, ANESTHESIA TRANSESOPHAGEAL ECHOCARDIOGRAM    Post Op day #:6    Subjective (Patient focus/Primary Problem for shift): pain          Pain Goal 0 Pain Rating 0           Pain Medication/ Regime effective to reduce patient pain scheduled tylenol for back pain.incisional discomfort    Objective (Physical assessment):           Rhythm: normal sinus rhythm            Bowel Activity: yes if Yes indicate when: 10-          Bowel Medications: not applicable            Incision: healing well          Incentive Spirometry Q 1-2 hour when awake:  yes Volume: 1000          Epicardial Pacing Wires:  not applicable            Patient Activity:           Up to chair for meals: yes          Ambulation with RN x2 (Not including CR): yes            Is patient in home clothes:not applicable             Chest Tubes   Pleural: not applicable Draining: not applicable               Suction: not applicable              Mediastinal: not applicable Draining: not applicable               Suction: not applicable   Dressing Change Daily:not applicable If No, why?HOLLEY                     Urinary Catheter: no           Preventative WOC consult (need MD order): no       Assessment (Nursing primary shift focus): Patient looking forward to go home today, Discharge teaching in process.  bedside and supportive. Patient plans to go home after lunch and cardiac rehab with her . Medications coming from [facility] pharmacy. NSR,BBB. Sternal Preautions.     Plan (Patient Care Plan/focus): Plan to go home later today after lunch with her  and belongings. Patient excited to go home. Post cabg teaching done with the patient during hospital   stay and she saw three Going Home videos. Call light in reach. Cardiac rehab scheduled today.       Yumiko Jackson RN   10/31/2024   9:49 AM

## 2024-10-31 NOTE — PROGRESS NOTES
sc  Patient Name: Holly Beal   MRN: [id number]   Date of Admission: 10/23/2024    Procedure: Procedure(s):  CORONARY ARTERY BYPASS GRAFT TIMES FIVE, LEFT INTERNAL MAMMARY ARTERY HARVEST, LEFT AND RIGHT LEG ENDOSCOPIC VESSEL PROCUREMENT,  LEFT RADIAL ARTERY HARVEST  EPIAORTIC ULTRASOUND, ANESTHESIA TRANSESOPHAGEAL ECHOCARDIOGRAM    Post Op day #:5    Subjective (Patient focus/Primary Problem for shift): Improve appetite & fatigue          Pain Goal 0 Pain Rating 0           Pain Medication/ Regime effective to reduce patient pain Yes    Objective (Physical assessment):           Rhythm: normal sinus rhythm            Bowel Activity: yes if Yes indicate when: Today          Bowel Medications: yes            Incision: healing well          Incentive Spirometry Q 1-2 hour when awake:  yes Volume: 1250          Epicardial Pacing Wires:  not applicable            Patient Activity:           Up to chair for meals: yes          Ambulation with RN x2 (Not including CR): yes            Is patient in home clothes:no             Chest Tubes   Pleural: not applicable Draining: not applicable               Suction: not applicable              Mediastinal: not applicable Draining: not applicable               Suction: not applicable   Dressing Change Daily:not applicable                      Urinary Catheter: no           Preventative WOC consult (need MD order): no       Assessment (Nursing primary shift focus): Pt A/O x 4. Vitally stable & saturating well on RA. Tele - NSR. Overall an uneventful evening - no acute changes with pt. Pt had no complaints other than upper back pain - improved following PRN tylenol & lidocaine paste. Pt continues on IV lasix.     Plan (Patient Care Plan/focus): Pt hopeful to discharge home tomorrow.       Jim Horne RN   10/30/2024   10:18 PM

## 2024-10-31 NOTE — PROGRESS NOTES
Patient discharge with her  to home Patient left in a w/c with staff to front door of hospital.Patient grateful to go home.

## 2024-10-31 NOTE — PLAN OF CARE
Cardiac Rehab Discharge Summary    Reason for therapy discharge:    Discharging home    Progress towards therapy goal(s). See goals on Care Plan in [facility] electronic health record for goal details.  Goals met.    Therapy recommendation(s):    Home w/family; outpt CR (ordered & scheduled)

## 2024-11-04 ENCOUNTER — TELEPHONE (OUTPATIENT)
Dept: CARDIOLOGY | Facility: CLINIC | Age: 69
End: 2024-11-04
Payer: COMMERCIAL

## 2024-11-04 ENCOUNTER — HOSPITAL ENCOUNTER (OUTPATIENT)
Dept: CARDIAC REHAB | Facility: HOSPITAL | Age: 69
Discharge: HOME | End: 2024-11-04
Attending: THORACIC SURGERY (CARDIOTHORACIC VASCULAR SURGERY)
Payer: COMMERCIAL

## 2024-11-04 DIAGNOSIS — Z95.1 S/P CABG (CORONARY ARTERY BYPASS GRAFT): ICD-10-CM

## 2024-11-04 PROCEDURE — 93798 PHYS/QHP OP CAR RHAB W/ECG: CPT

## 2024-11-04 PROCEDURE — 93797 PHYS/QHP OP CAR RHAB WO ECG: CPT

## 2024-11-04 NOTE — TELEPHONE ENCOUNTER
Cardiovascular Surgery  [facility]    DISCHARGE FOLLOW UP PHONE CALL      POST OP MONITORING  How is your pain on a 0-10 scale, how are you managing your pain? Pt taking tylenol appropriately.    ACTIVITY  How is your activity tolerance? Up and walking well, feels fatigued.  Are you still doing sternal precautions? Yes.  Do you hear any clicking when you are moving or taking a deep breath? No.    Are you weighing yourself daily? Yes pt is currently 151 lbs.    SIGNS AND SYMPTOMS OF INFECTION  INCREASE IN PAIN - No  FEVER - No  DRAINAGE - No If so, color: NA  REDNESS - No  SWELLING - No    ASSISTANCE  Do you have someone at home to assist you with your daily activities? Spouse.    MEDICATIONS  Is someone helping you to set up your medications? Pt is managing.  Do you have any questions about your medications? No.    Are you on a blood thinner? No.  Who is managing your INRs? NA    FOLLOW UP  You are scheduled to see your primary care physician on 11/8.  You are scheduled to see our surgery advanced practive provider for post operative follow up on 11/19.    You are scheduled for cardiac rehab on 11/4.  You are scheduled to see your cardiologist on 12/13.    CONTACT INFORMATION  Please feel free to call us with any questions or symptoms that are concerning for you at [phone]. If it is after 4:00 in the afternoon, or a weekend please call [phone] and ask for the on call specialist. We want to do everything we can to help prevent you needing to return to the ED, so please do not hesitate to call us.    Cristela Iglesias RN  Cardiovascular Surgery  [phone]  November 4, 2024 11:11 AM        ----- Message from Cristela ALVAREZ sent at 10/31/2024  8:47 AM CDT -----  POC 11/1  ----- Message -----  From: Iveth Jin PA-C  Sent: 10/31/2024   8:41 AM CDT  To: MORENO Ruby discharged to home today. S/p cab x 5 with Dr Fuentes on 10/25/24.    Thanks,    Iveth

## 2024-11-06 ENCOUNTER — TRANSFERRED RECORDS (OUTPATIENT)
Dept: HEALTH INFORMATION MANAGEMENT | Facility: CLINIC | Age: 69
End: 2024-11-06

## 2024-11-07 ENCOUNTER — HOSPITAL ENCOUNTER (OUTPATIENT)
Dept: CARDIAC REHAB | Facility: HOSPITAL | Age: 69
Discharge: HOME | End: 2024-11-07
Attending: INTERNAL MEDICINE
Payer: COMMERCIAL

## 2024-11-07 PROCEDURE — 93798 PHYS/QHP OP CAR RHAB W/ECG: CPT

## 2024-11-08 LAB — PHQ9 SCORE: 1

## 2024-11-12 ENCOUNTER — HOSPITAL ENCOUNTER (OUTPATIENT)
Dept: CARDIAC REHAB | Facility: HOSPITAL | Age: 69
Discharge: HOME | End: 2024-11-12
Attending: INTERNAL MEDICINE
Payer: COMMERCIAL

## 2024-11-12 PROCEDURE — 93798 PHYS/QHP OP CAR RHAB W/ECG: CPT

## 2024-11-14 ENCOUNTER — HOSPITAL ENCOUNTER (OUTPATIENT)
Dept: CARDIAC REHAB | Facility: HOSPITAL | Age: 69
Discharge: HOME | End: 2024-11-14
Attending: INTERNAL MEDICINE
Payer: COMMERCIAL

## 2024-11-14 PROCEDURE — 93798 PHYS/QHP OP CAR RHAB W/ECG: CPT

## 2024-11-19 ENCOUNTER — OFFICE VISIT (OUTPATIENT)
Dept: CARDIOLOGY | Facility: CLINIC | Age: 69
End: 2024-11-19
Payer: COMMERCIAL

## 2024-11-19 VITALS
BODY MASS INDEX: 25.34 KG/M2 | SYSTOLIC BLOOD PRESSURE: 130 MMHG | HEART RATE: 131 BPM | DIASTOLIC BLOOD PRESSURE: 62 MMHG | RESPIRATION RATE: 18 BRPM | WEIGHT: 157 LBS

## 2024-11-19 DIAGNOSIS — Z95.1 S/P CABG (CORONARY ARTERY BYPASS GRAFT): Primary | ICD-10-CM

## 2024-11-19 PROCEDURE — 99024 POSTOP FOLLOW-UP VISIT: CPT

## 2024-11-19 RX ORDER — METFORMIN HYDROCHLORIDE 500 MG/1
500 TABLET, EXTENDED RELEASE ORAL
COMMUNITY

## 2024-11-19 NOTE — PROGRESS NOTES
CARDIOTHORACIC SURGERY FOLLOW-UP VISIT     Holly Beal   1955   [id number]      Reason for visit: Post operative clinic visit. Patient underwent CABGx5 with Dr. Fuentes on 10/25/2024.     HPI: Holly Beal is a 69 year old year old female seen in clinic for a routine follow-up appointment after surgery. Patient has past medical history as below. Hospital course was remarkable only for treatment for possible UTI. Patient was discharged to home.    Patient has been doing overall well since discharge. Patient did follow-up with primary care since leaving the hospital. Reports that incision is healing well. Denies fevers, peripheral edema. Appetite is improving and patient is voiding without difficulty. Weight has been stable. Pain management at this point with occasional Tylenol. Patient has been attending cardiac rehab and this is going well. Does complain of poor sleep.    PAST MEDICAL HISTORY:  Past Medical History:   Diagnosis Date    Asthma     Diabetes mellitus, type 2 (H)        PAST SURGICAL HISTORY:  Past Surgical History:   Procedure Laterality Date     SECTION      CORONARY ARTERY BYPASS GRAFT, WITH ENDOSCOPIC VESSEL PROCUREMENT N/A 10/25/2024    Procedure: CORONARY ARTERY BYPASS GRAFT TIMES FIVE, LEFT INTERNAL MAMMARY ARTERY HARVEST, LEFT AND RIGHT LEG ENDOSCOPIC VESSEL PROCUREMENT,;  Surgeon: Rachel Fuentes MD;  Location: [facility] [place]     CORONARY ANGIOGRAM N/A 10/23/2024    Procedure: Coronary Angiogram;  Surgeon: Tyrone Pendleton MD;  Location: [facility] CATH LAB CV    CV LEFT HEART CATH N/A 10/23/2024    Procedure: Left Heart Catheterization;  Surgeon: Tyrone Pendleton MD;  Location: [facility] CATH LAB CV    IR NEPHROLITHOTOMY  10/22/2015    OTHER SURGICAL HISTORY      Esophagus line surgeryremoval of guide wire    PROCURE ARTERY RADIAL Left 10/25/2024    Procedure: LEFT RADIAL ARTERY HARVEST;  Surgeon: Rachel Fuentes MD;  Location: [facility] [place] OR     TRANSESOPHAGEAL ECHOCARDIOGRAM INTRAOPERATIVE  10/25/2024    Procedure: EPIAORTIC ULTRASOUND, ANESTHESIA TRANSESOPHAGEAL ECHOCARDIOGRAM;  Surgeon: Rachel Fuentes MD;  Location: [facility] Main OR       CURRENT MEDICATIONS:     Current Outpatient Medications:     acetaminophen (TYLENOL) 500 MG tablet, Take 1-2 tablets (500-1,000 mg) by mouth every 6 hours as needed for mild pain., Disp: , Rfl:     albuterol (PROVENTIL HFA;VENTOLIN HFA) 90 mcg/actuation inhaler, [ALBUTEROL (PROVENTIL HFA;VENTOLIN HFA) 90 MCG/ACTUATION INHALER] Inhale 2 puffs every 6 (six) hours as needed for wheezing or shortness of breath., Disp: , Rfl:     Alpha Lipoic Ac-Biotin-Keratin (BIOTIN-KERATIN-ALPHA LIPOIC AC PO), Take 1 capsule by mouth daily., Disp: , Rfl:     ALPRAZolam (XANAX) 0.5 MG tablet, [ALPRAZOLAM (XANAX) 0.5 MG TABLET] Take 0.5 mg by mouth 3 (three) times a day as needed for anxiety., Disp: , Rfl:     amLODIPine (NORVASC) 2.5 MG tablet, Take 1 tablet (2.5 mg) by mouth daily., Disp: 30 tablet, Rfl: 5    aspirin (ASA) 81 MG chewable tablet, Take 2 tablets (162 mg) by mouth daily., Disp: 60 tablet, Rfl: 3    AZO-CRANBERRY PO, Take 1 tablet by mouth three times a week., Disp: , Rfl:     cephALEXin (KEFLEX) 500 MG capsule, Take 500 mg by mouth daily as needed (as needed for kidney stones)., Disp: , Rfl:     cyanocobalamin 1000 MCG tablet, [CYANOCOBALAMIN 1000 MCG TABLET] Take 1,000 mcg by mouth daily., Disp: , Rfl:     fluticasone (FLONASE) 50 mcg/actuation nasal spray, [FLUTICASONE (FLONASE) 50 MCG/ACTUATION NASAL SPRAY] 2 sprays into each nostril daily as needed for rhinitis., Disp: , Rfl:     metFORMIN (GLUCOPHAGE XR) 500 MG 24 hr tablet, Take 500 mg by mouth daily (with dinner)., Disp: , Rfl:     metoprolol tartrate (LOPRESSOR) 100 MG tablet, Take 1 tablet (100 mg) by mouth 2 times daily., Disp: 60 tablet, Rfl: 3    omeprazole (PRILOSEC) 20 MG capsule, [OMEPRAZOLE (PRILOSEC) 20 MG CAPSULE] Take 20 mg by mouth every evening. ,  Disp: , Rfl:     Probiotic Product (PROBIOTIC 10 ULTRA STRENGTH PO), Take 1 capsule by mouth three times a week. Alternates  with AZO Cranberry., Disp: , Rfl:     rosuvastatin (CRESTOR) 40 MG tablet, Take 1 tablet (40 mg) by mouth daily., Disp: 30 tablet, Rfl: 3    metFORMIN (GLUCOPHAGE-XR) 750 MG 24 hr tablet, Take 2 tablets (1,500 mg) by mouth daily (with dinner). (Patient not taking: Reported on 11/19/2024), Disp: 60 tablet, Rfl: 3    nitroFURantoin macrocrystal-monohydrate (MACROBID) 100 MG capsule, Take 1 capsule (100 mg) by mouth every 12 hours., Disp: 4 capsule, Rfl: 0    ALLERGIES:      Allergies   Allergen Reactions    Atorvastatin Nausea    Simvastatin Nausea       ROS:  Gen: No fevers, weight loss/gain  CV: Denies chest pain, peripheral edema  Pulm: Denies SOB  GI/: Voiding without problems, appetite improving.     LABS:  None    IMAGING:  None    PHYSICAL EXAM:   /62 (BP Location: Right arm, Patient Position: Sitting, Cuff Size: Adult Regular)   Pulse (!) 131   Resp 18   Wt 71.2 kg (157 lb)   BMI 25.34 kg/m    General: Alert and oriented, pleasant, no acute distress.  CV:  No peripheral edema.  Pulm: Easy work of breathing on room air.   GI: Soft, non-tender, and non-distended  Incision: Chest, arm, and leg incisions clean dry and intact without erythema, swelling or drainage  Neuro: CNs grossly intact.      ASSESSMENT/PLAN:  Holly Beal is a 69 year old year old female status post CABG who returns to clinic for postop visit.     - Surgically doing well. Incisions are healing well with no signs of infection.  - Hemodynamics are stable. No medication changes were needed today.  - Recommend trying 5-10mg melatonin at night for sleep. Call if you need something stronger.  - Of note, your are on amlodipine for radial artery graft protection. This should be maintained for at least 6 months post-op.  - Follow up with your cardiologist as scheduled (Dr. Godwin 12/13/2024).  - Continue Cardiac Rehab  until completed.   - May start driving this Friday 11/22 (4 weeks post-op) if not using narcotic pain medications.  - Continue strict sternal precautions until 12/20. No lifting >10bs; may gradually increase at this point (8 weeks post-op).   - No need for further follow-up with CV surgery unless concerns. Feel free to call our office with questions. [phone].         _______  Vanesa Oreilly PA-C  Cardiothoracic Surgery  [phone]

## 2024-11-19 NOTE — PATIENT INSTRUCTIONS
- Surgically doing well. Incisions are healing well with no signs of infection.  - Hemodynamics are stable. No medication changes were needed today.  - Recommend trying 5-10mg melatonin at night for sleep. Call if you need something stronger.  - Of note, your are on amlodipine for radial artery graft protection. This should be maintained for at least 6 months post-op.  - Follow up with your cardiologist as scheduled (Dr. Godwin 12/13/2024)  - Continue Cardiac Rehab until completed.   - May start driving this Friday 11/22 (4 weeks post-op) if not using narcotic pain medications.  - Continue strict sternal precautions until 12/20. No lifting >10bs; may gradually increase at this point (8 weeks post-op).   - No need for further follow-up with CV surgery unless concerns. Feel free to call our office with questions. [phone].

## 2024-11-26 ENCOUNTER — HOSPITAL ENCOUNTER (OUTPATIENT)
Dept: CARDIAC REHAB | Facility: HOSPITAL | Age: 69
Discharge: HOME | End: 2024-11-26
Attending: INTERNAL MEDICINE
Payer: COMMERCIAL

## 2024-11-26 PROCEDURE — 93798 PHYS/QHP OP CAR RHAB W/ECG: CPT

## 2024-12-03 ENCOUNTER — HOSPITAL ENCOUNTER (OUTPATIENT)
Dept: CARDIAC REHAB | Facility: HOSPITAL | Age: 69
Discharge: HOME | End: 2024-12-03
Attending: INTERNAL MEDICINE
Payer: COMMERCIAL

## 2024-12-03 PROCEDURE — 93798 PHYS/QHP OP CAR RHAB W/ECG: CPT

## 2024-12-05 DIAGNOSIS — T14.8XXA WOUND INFECTION: Primary | ICD-10-CM

## 2024-12-05 DIAGNOSIS — L08.9 WOUND INFECTION: Primary | ICD-10-CM

## 2024-12-05 RX ORDER — CEPHALEXIN 500 MG/1
500 CAPSULE ORAL 4 TIMES DAILY
Qty: 28 CAPSULE | Refills: 0 | Status: SHIPPED | OUTPATIENT
Start: 2024-12-05

## 2024-12-08 ENCOUNTER — HEALTH MAINTENANCE LETTER (OUTPATIENT)
Age: 69
End: 2024-12-08

## 2024-12-10 ENCOUNTER — HOSPITAL ENCOUNTER (OUTPATIENT)
Dept: CARDIAC REHAB | Facility: HOSPITAL | Age: 69
Discharge: HOME | End: 2024-12-10
Attending: INTERNAL MEDICINE
Payer: COMMERCIAL

## 2024-12-10 ENCOUNTER — OFFICE VISIT (OUTPATIENT)
Dept: CARDIOLOGY | Facility: CLINIC | Age: 69
End: 2024-12-10
Payer: COMMERCIAL

## 2024-12-10 VITALS
HEART RATE: 76 BPM | WEIGHT: 154 LBS | DIASTOLIC BLOOD PRESSURE: 62 MMHG | BODY MASS INDEX: 24.86 KG/M2 | RESPIRATION RATE: 18 BRPM | SYSTOLIC BLOOD PRESSURE: 138 MMHG

## 2024-12-10 DIAGNOSIS — Z95.1 S/P CABG X 5: ICD-10-CM

## 2024-12-10 DIAGNOSIS — T14.8XXA WOUND INFECTION: Primary | ICD-10-CM

## 2024-12-10 DIAGNOSIS — L08.9 WOUND INFECTION: Primary | ICD-10-CM

## 2024-12-10 PROCEDURE — 99024 POSTOP FOLLOW-UP VISIT: CPT | Performed by: PHYSICIAN ASSISTANT

## 2024-12-10 PROCEDURE — 93798 PHYS/QHP OP CAR RHAB W/ECG: CPT

## 2024-12-10 RX ORDER — CEPHALEXIN 500 MG/1
500 CAPSULE ORAL 4 TIMES DAILY
OUTPATIENT
Start: 2024-12-10 | End: 2024-12-17

## 2024-12-10 NOTE — PROGRESS NOTES
CARDIOTHORACIC SURGERY FOLLOW-UP VISIT     Holly Beal   1955   [id number]      Reason for visit: Post operative clinic visit. Patient underwent CAB x 5 with radial artery harvest with Dr. Fuentes on 10/25/24.     HPI: Holly Beal is a 69 year old year old female seen in clinic for a follow-up appointment regarding left UE infection.     Patient has been doing well since discharge, however her left wrist incision was infected. It now appears with less erythema and edema. No drainage noted. Large scabbed area. Some tingling sensation but probably due to edema.    PAST MEDICAL HISTORY:  Past Medical History:   Diagnosis Date    Asthma     Diabetes mellitus, type 2 (H)        PAST SURGICAL HISTORY:  Past Surgical History:   Procedure Laterality Date     SECTION  ,88    CORONARY ARTERY BYPASS GRAFT, WITH ENDOSCOPIC VESSEL PROCUREMENT N/A 10/25/2024    Procedure: CORONARY ARTERY BYPASS GRAFT TIMES FIVE, LEFT INTERNAL MAMMARY ARTERY HARVEST, LEFT AND RIGHT LEG ENDOSCOPIC VESSEL PROCUREMENT,;  Surgeon: Rachel Fuentes MD;  [place] [facility] [place]    CV CORONARY ANGIOGRAM N/A 10/23/2024    Procedure: Coronary Angiogram;  Surgeon: Tyrone Pendleton MD;  Location: [facility] CATH LAB CV    CV LEFT HEART CATH N/A 10/23/2024    Procedure: Left Heart Catheterization;  Surgeon: Tyrone Pendleton MD;  Location: [facility] CATH LAB CV    IR NEPHROLITHOTOMY  10/22/2015    OTHER SURGICAL HISTORY      Esophagus line surgeryremoval of guide wire    PROCURE ARTERY RADIAL Left 10/25/2024    Procedure: LEFT RADIAL ARTERY HARVEST;  Surgeon: Rachel Fuentes MD;  Location: [facility] [place]    TRANSESOPHAGEAL ECHOCARDIOGRAM INTRAOPERATIVE  10/25/2024    Procedure: EPIAORTIC ULTRASOUND, ANESTHESIA TRANSESOPHAGEAL ECHOCARDIOGRAM;  Surgeon: Rachel Fuentes MD;  Location: [facility] [place]       CURRENT MEDICATIONS:     Current Outpatient Medications:     acetaminophen (TYLENOL) 500 MG tablet, Take 1-2 tablets  (500-1,000 mg) by mouth every 6 hours as needed for mild pain., Disp: , Rfl:     albuterol (PROVENTIL HFA;VENTOLIN HFA) 90 mcg/actuation inhaler, [ALBUTEROL (PROVENTIL HFA;VENTOLIN HFA) 90 MCG/ACTUATION INHALER] Inhale 2 puffs every 6 (six) hours as needed for wheezing or shortness of breath., Disp: , Rfl:     Alpha Lipoic Ac-Biotin-Keratin (BIOTIN-KERATIN-ALPHA LIPOIC AC PO), Take 1 capsule by mouth daily., Disp: , Rfl:     ALPRAZolam (XANAX) 0.5 MG tablet, [ALPRAZOLAM (XANAX) 0.5 MG TABLET] Take 0.5 mg by mouth 3 (three) times a day as needed for anxiety., Disp: , Rfl:     amLODIPine (NORVASC) 2.5 MG tablet, Take 1 tablet (2.5 mg) by mouth daily., Disp: 30 tablet, Rfl: 5    aspirin (ASA) 81 MG chewable tablet, Take 2 tablets (162 mg) by mouth daily., Disp: 60 tablet, Rfl: 3    AZO-CRANBERRY PO, Take 1 tablet by mouth three times a week., Disp: , Rfl:     cephALEXin (KEFLEX) 500 MG capsule, Take 1 capsule (500 mg) by mouth 4 times daily., Disp: 28 capsule, Rfl: 0    cyanocobalamin 1000 MCG tablet, [CYANOCOBALAMIN 1000 MCG TABLET] Take 1,000 mcg by mouth daily., Disp: , Rfl:     fluticasone (FLONASE) 50 mcg/actuation nasal spray, [FLUTICASONE (FLONASE) 50 MCG/ACTUATION NASAL SPRAY] 2 sprays into each nostril daily as needed for rhinitis., Disp: , Rfl:     metFORMIN (GLUCOPHAGE XR) 500 MG 24 hr tablet, Take 500 mg by mouth daily (with dinner)., Disp: , Rfl:     metoprolol tartrate (LOPRESSOR) 100 MG tablet, Take 1 tablet (100 mg) by mouth 2 times daily., Disp: 60 tablet, Rfl: 3    omeprazole (PRILOSEC) 20 MG capsule, [OMEPRAZOLE (PRILOSEC) 20 MG CAPSULE] Take 20 mg by mouth every evening. , Disp: , Rfl:     Probiotic Product (PROBIOTIC 10 ULTRA STRENGTH PO), Take 1 capsule by mouth three times a week. Alternates  with AZO Cranberry., Disp: , Rfl:     rosuvastatin (CRESTOR) 40 MG tablet, Take 1 tablet (40 mg) by mouth daily., Disp: 30 tablet, Rfl: 3    cephALEXin (KEFLEX) 500 MG capsule, Take 500 mg by mouth daily as  needed (as needed for kidney stones)., Disp: , Rfl:     metFORMIN (GLUCOPHAGE-XR) 750 MG 24 hr tablet, Take 2 tablets (1,500 mg) by mouth daily (with dinner). (Patient not taking: Reported on 12/10/2024), Disp: 60 tablet, Rfl: 3    nitroFURantoin macrocrystal-monohydrate (MACROBID) 100 MG capsule, Take 1 capsule (100 mg) by mouth every 12 hours., Disp: 4 capsule, Rfl: 0    Current Facility-Administered Medications:     cephALEXin (KEFLEX) capsule 500 mg, 500 mg, Oral, 4x Daily,     ALLERGIES:      Allergies   Allergen Reactions    Atorvastatin Nausea    Simvastatin Nausea       ROS:  Gen: No fevers, weight loss/gain  CV: Denies chest pain, peripheral edema  Pulm: Denies SOB  GI/: Voiding without problems, appetite improving.     LABS:  None    IMAGING:  None    PHYSICAL EXAM:   /62 (BP Location: Right arm, Patient Position: Sitting, Cuff Size: Adult Regular)   Pulse 76   Resp 18   Wt 69.9 kg (154 lb)   BMI 24.86 kg/m    General: Alert and oriented, pleasant, no acute distress.  CV:  No peripheral edema.  Pulm: Easy work of breathing on room air.   GI: Soft, non-tender, and non-distended  Incision: left radial incision clean dry and intact with minimal erythema, swelling  and no drainage. Scabbed area over incision.  Neuro: CNs grossly intact.      ASSESSMENT/PLAN:  Holly Beal is a 69 year old year old female status post CAB x 5 who returns to clinic for postop visit.     - Surgically doing well. Incisions are healing well with no signs of infection.  - Hemodynamics are stable.   - Refill for Keflex 500 mg PO QID x 7 days if needed as she is going to [place] soon.   - No need for further follow-up with CV surgery unless concerns. Feel free to call our office with questions. [phone]         Iveth Jin PA-C  [facility] Cardiothoracic Surgery  [place]

## 2024-12-12 ENCOUNTER — HOSPITAL ENCOUNTER (OUTPATIENT)
Dept: CARDIAC REHAB | Facility: HOSPITAL | Age: 69
End: 2024-12-12
Attending: INTERNAL MEDICINE
Payer: COMMERCIAL

## 2024-12-12 DIAGNOSIS — T14.8XXA WOUND INFECTION: ICD-10-CM

## 2024-12-12 DIAGNOSIS — L08.9 WOUND INFECTION: ICD-10-CM

## 2024-12-12 PROCEDURE — 93798 PHYS/QHP OP CAR RHAB W/ECG: CPT

## 2024-12-13 ENCOUNTER — TELEPHONE (OUTPATIENT)
Dept: CARDIOLOGY | Facility: CLINIC | Age: 69
End: 2024-12-13

## 2024-12-13 ENCOUNTER — OFFICE VISIT (OUTPATIENT)
Dept: CARDIOLOGY | Facility: CLINIC | Age: 69
End: 2024-12-13
Payer: COMMERCIAL

## 2024-12-13 VITALS
WEIGHT: 155 LBS | SYSTOLIC BLOOD PRESSURE: 134 MMHG | BODY MASS INDEX: 24.91 KG/M2 | DIASTOLIC BLOOD PRESSURE: 72 MMHG | HEIGHT: 66 IN | RESPIRATION RATE: 17 BRPM | HEART RATE: 59 BPM

## 2024-12-13 DIAGNOSIS — T14.8XXA WOUND INFECTION: Primary | ICD-10-CM

## 2024-12-13 DIAGNOSIS — E78.5 DYSLIPIDEMIA: Primary | ICD-10-CM

## 2024-12-13 DIAGNOSIS — L08.9 WOUND INFECTION: Primary | ICD-10-CM

## 2024-12-13 DIAGNOSIS — I25.10 CORONARY ARTERY DISEASE INVOLVING NATIVE CORONARY ARTERY OF NATIVE HEART WITHOUT ANGINA PECTORIS: ICD-10-CM

## 2024-12-13 PROCEDURE — 99214 OFFICE O/P EST MOD 30 MIN: CPT | Performed by: INTERNAL MEDICINE

## 2024-12-13 PROCEDURE — G2211 COMPLEX E/M VISIT ADD ON: HCPCS | Performed by: INTERNAL MEDICINE

## 2024-12-13 RX ORDER — TRIAMCINOLONE ACETONIDE 1 MG/G
CREAM TOPICAL PRN
COMMUNITY
End: 2024-12-13

## 2024-12-13 RX ORDER — TRIAMCINOLONE ACETONIDE 5 MG/G
1 CREAM TOPICAL PRN
COMMUNITY
Start: 2024-02-26

## 2024-12-13 RX ORDER — CEPHALEXIN 500 MG/1
500 CAPSULE ORAL 4 TIMES DAILY
Qty: 28 CAPSULE | Refills: 0 | Status: SHIPPED | OUTPATIENT
Start: 2024-12-13 | End: 2024-12-13

## 2024-12-13 NOTE — PATIENT INSTRUCTIONS
Ms. Holly Beal,     It was a pleasure to see you in the office today. My recommendations for you include:   1. Consider have family members check CT coronary calcium scan  2. Continue cardiac rehab     Please do not hesitate to call the [facility] clinic with any questions or concerns at [phone].    Sincerely,     Olga Godwin MD

## 2024-12-13 NOTE — LETTER
12/13/2024    Cj Perez MD  [address]  [facility] [address]    RE: Holly Beal       Dear Colleague,     I had the pleasure of seeing Holly Beal in the [facility].    HEART CARE ENCOUNTER CONSULTATON NOTE      [facility]  [phone]      Assessment/Recommendations   Assessment:  1.  Coronary artery disease status post 5 vessel CABG with LIMA-LAD, RA-OM1, SVG-diagonal, SVG-OM 3, SVG-RPDA by Dr. Fuentes on 10/25/2024 recovering well without anginal complaints  2.  Dyslipidemia: Goal LDL of less than 70.  Continue on high-dose statin therapy  3.  Diabetes mellitus type 2: Management per primary team  4.  Anxiety/depression     Plan:  1.  Continue cardiac rehab  2.  Continue on aspirin indefinitely  3.  Continue on metoprolol tartrate 100 mg twice daily, Crestor 40 mg daily  4.  Continue on amlodipine 2.5 mg for a year  5.  Need repeat fasting lipids   Follow-up in 6 months         History of Present Illness/Subjective    HPI: Holly Beal is a 69 year old female with history of coronary artery disease status post 5 vessel CABG with LIMA-LAD, RA-OM1, SVG-diagonal, SVG-OM 3, SVG-RPDA by Dr. Fuentes on 10/25/2024, diabetes mellitus type 2, dyslipidemia, anxiety/depression who I am seeing today in follow-up.  She has been participating in cardiac rehab.  She has been walking a lot and plans on getting a treadmill for about continue with exercise.  She would like to change back to lisinopril once she is done with amlodipine that she is currently on for her radial graft.  No exertional chest discomfort and no shortness of breath.  No significant edema on exam.  She denies any palpitations.  She did experience redness around her radial artery incision site with some oozing and this has resolved with antibiotics.    The longitudinal plan of care for the diagnosis(es)/condition(s) as documented were addressed during this visit. Due to the added  "complexity in care, I will continue to support Holly in the subsequent management and with ongoing continuity of care.      Physical Examination  Review of Systems   Vitals: /72 (BP Location: Left arm, Patient Position: Sitting, Cuff Size: Adult Regular)   Pulse 59   Resp 17   Ht 1.676 m (5' 6\")   Wt 70.3 kg (155 lb)   BMI 25.02 kg/m    BMI= Body mass index is 25.02 kg/m .  Wt Readings from Last 3 Encounters:   24 70.3 kg (155 lb)   12/10/24 69.9 kg (154 lb)   24 71.2 kg (157 lb)       General Appearance:   no distress, normal body habitus   ENT/Mouth: membranes moist, no oral lesions or bleeding gums.      EYES:  no scleral icterus, normal conjunctivae   Neck: no carotid bruits or thyromegaly   Chest/Lungs:   lungs are clear to auscultation, incision site well-healing   Cardiovascular:   Regular. Normal first and second heart sounds with no murmur no edema bilaterally        Extremities: no cyanosis or clubbing   Skin: no xanthelasma, warm.    Neurologic: normal  bilateral, no tremors     Psychiatric: alert and oriented x3, calm        Please refer above for cardiac ROS details.        Medical History  Surgical History Family History Social History   Past Medical History:   Diagnosis Date     Asthma      Diabetes mellitus, type 2 (H)      Past Surgical History:   Procedure Laterality Date      SECTION       CORONARY ARTERY BYPASS GRAFT, WITH ENDOSCOPIC VESSEL PROCUREMENT N/A 10/25/2024    Procedure: CORONARY ARTERY BYPASS GRAFT TIMES FIVE, LEFT INTERNAL MAMMARY ARTERY HARVEST, LEFT AND RIGHT LEG ENDOSCOPIC VESSEL PROCUREMENT,;  Surgeon: Rachel Fuentes MD;  Location: [facility] Main OR     CV CORONARY ANGIOGRAM N/A 10/23/2024    Procedure: Coronary Angiogram;  Surgeon: Tyrone Pendleton MD;  Location: [facility] CATH LAB CV     CV LEFT HEART CATH N/A 10/23/2024    Procedure: Left Heart Catheterization;  Surgeon: Tyrone Pendleton MD;  Location: [facility] CATH LAB CV     IR " NEPHROLITHOTOMY  10/22/2015     OTHER SURGICAL HISTORY  2000    Esophagus line surgeryremoval of guide wire     PROCURE ARTERY RADIAL Left 10/25/2024    Procedure: LEFT RADIAL ARTERY HARVEST;  Surgeon: Rachel Fuentes MD;  Location: [facility] [place]     TRANSESOPHAGEAL ECHOCARDIOGRAM INTRAOPERATIVE  10/25/2024    Procedure: EPIAORTIC ULTRASOUND, ANESTHESIA TRANSESOPHAGEAL ECHOCARDIOGRAM;  Surgeon: Rachel Fuentes MD;  Location: [facility] [place]     No family history on file.     Social History     Socioeconomic History     Marital status:      Spouse name: Not on file     Number of children: Not on file     Years of education: Not on file     Highest education level: Not on file   Occupational History     Not on file   Tobacco Use     Smoking status: Never     Smokeless tobacco: Never   Substance and Sexual Activity     Alcohol use: Yes     Comment: Alcoholic Drinks/day: rare     Drug use: No     Sexual activity: Not on file   Other Topics Concern     Not on file   Social History Narrative     Not on file     Social Drivers of Health     Financial Resource Strain: Low Risk  (10/23/2024)    Financial Resource Strain      Within the past 12 months, have you or your family members you live with been unable to get utilities (heat, electricity) when it was really needed?: No   Food Insecurity: Low Risk  (10/23/2024)    Food Insecurity      Within the past 12 months, did you worry that your food would run out before you got money to buy more?: No      Within the past 12 months, did the food you bought just not last and you didn t have money to get more?: No   Transportation Needs: Low Risk  (10/23/2024)    Transportation Needs      Within the past 12 months, has lack of transportation kept you from medical appointments, getting your medicines, non-medical meetings or appointments, work, or from getting things that you need?: No   Physical Activity: Not on file   Stress: Not on file   Social Connections: Not  on file   Interpersonal Safety: High Risk (10/23/2024)    Interpersonal Safety      Do you feel physically and emotionally safe where you currently live?: No      Within the past 12 months, have you been hit, slapped, kicked or otherwise physically hurt by someone?: No      Within the past 12 months, have you been humiliated or emotionally abused in other ways by your partner or ex-partner?: No   Housing Stability: Low Risk  (10/23/2024)    Housing Stability      Do you have housing? : Yes      Are you worried about losing your housing?: No           Medications  Allergies   Current Outpatient Medications   Medication Sig Dispense Refill     acetaminophen (TYLENOL) 500 MG tablet Take 1-2 tablets (500-1,000 mg) by mouth every 6 hours as needed for mild pain.       albuterol (PROVENTIL HFA;VENTOLIN HFA) 90 mcg/actuation inhaler [ALBUTEROL (PROVENTIL HFA;VENTOLIN HFA) 90 MCG/ACTUATION INHALER] Inhale 2 puffs every 6 (six) hours as needed for wheezing or shortness of breath.       Alpha Lipoic Ac-Biotin-Keratin (BIOTIN-KERATIN-ALPHA LIPOIC AC PO) Take 1 capsule by mouth daily.       ALPRAZolam (XANAX) 0.5 MG tablet [ALPRAZOLAM (XANAX) 0.5 MG TABLET] Take 0.5 mg by mouth 3 (three) times a day as needed for anxiety.       amLODIPine (NORVASC) 2.5 MG tablet Take 1 tablet (2.5 mg) by mouth daily. 30 tablet 5     aspirin (ASA) 81 MG chewable tablet Take 2 tablets (162 mg) by mouth daily. 60 tablet 3     AZO-CRANBERRY PO Take 1 tablet by mouth three times a week.       cephALEXin (KEFLEX) 500 MG capsule Take 500 mg by mouth daily as needed (as needed for kidney stones).       cyanocobalamin 1000 MCG tablet [CYANOCOBALAMIN 1000 MCG TABLET] Take 1,000 mcg by mouth daily.       fluticasone (FLONASE) 50 mcg/actuation nasal spray [FLUTICASONE (FLONASE) 50 MCG/ACTUATION NASAL SPRAY] 2 sprays into each nostril daily as needed for rhinitis.       metFORMIN (GLUCOPHAGE XR) 500 MG 24 hr tablet Take 500 mg by mouth daily (with dinner).   "     metoprolol tartrate (LOPRESSOR) 100 MG tablet Take 1 tablet (100 mg) by mouth 2 times daily. 60 tablet 3     omeprazole (PRILOSEC) 20 MG capsule [OMEPRAZOLE (PRILOSEC) 20 MG CAPSULE] Take 20 mg by mouth every evening.        Probiotic Product (PROBIOTIC 10 ULTRA STRENGTH PO) Take 1 capsule by mouth three times a week. Alternates  with AZO Cranberry.       rosuvastatin (CRESTOR) 40 MG tablet Take 1 tablet (40 mg) by mouth daily. 30 tablet 3     triamcinolone (ARISTOCORT HP) 0.5 % external cream Apply 1 Application topically as needed for irritation.         Allergies   Allergen Reactions     Atorvastatin Nausea     Simvastatin Nausea          Lab Results    Chemistry/lipid CBC Cardiac Enzymes/BNP/TSH/INR   Recent Labs   Lab Test 05/06/24  1111   CHOL 187   HDL 51   *   TRIG 136     Recent Labs   Lab Test 05/06/24  1111 07/05/23  1235 07/15/22  1543   * 129* 116*     Recent Labs   Lab Test 10/31/24  0745 10/31/24  0430   NA  --  140   POTASSIUM  --  4.5   CHLORIDE  --  97*   CO2  --  30*   * 232*   BUN  --  18.1   CR  --  0.82   GFRESTIMATED  --  77   SHAQUILLE  --  10.0     Recent Labs   Lab Test 10/31/24  0430 10/30/24  0432 10/29/24  0420   CR 0.82 0.90 0.76     Recent Labs   Lab Test 10/23/24  1635   A1C 7.1*          Recent Labs   Lab Test 10/29/24  0420 10/28/24  0457   WBC 11.0 14.5*   HGB  --  9.4*   HCT  --  28.7*   MCV  --  90    186     Recent Labs   Lab Test 10/28/24  0457 10/27/24  0428 10/26/24  0411   HGB 9.4* 9.3* 8.9*    No results for input(s): \"TROPONINI\" in the last 56598 hours.  No results for input(s): \"BNP\", \"NTBNPI\", \"NTBNP\" in the last 90296 hours.  Recent Labs   Lab Test 11/07/23  1644   TSH 1.97     Recent Labs   Lab Test 10/25/24  1327 10/25/24  1130 10/24/24  0950   INR 1.31* 1.43* 1.06        Olga Godwin MD                                        Thank you for allowing me to participate in the care of your patient.      Sincerely,     Olga Godwin MD "     [facility]  cc:   No referring provider defined for this encounter.

## 2024-12-13 NOTE — PROGRESS NOTES
HEART CARE ENCOUNTER CONSULTATON NOTE      [facility]  [phone]      Assessment/Recommendations   Assessment:  1.  Coronary artery disease status post 5 vessel CABG with LIMA-LAD, RA-OM1, SVG-diagonal, SVG-OM 3, SVG-RPDA by Dr. Fuentes on 10/25/2024 recovering well without anginal complaints  2.  Dyslipidemia: Goal LDL of less than 70.  Continue on high-dose statin therapy  3.  Diabetes mellitus type 2: Management per primary team  4.  Anxiety/depression     Plan:  1.  Continue cardiac rehab  2.  Continue on aspirin indefinitely  3.  Continue on metoprolol tartrate 100 mg twice daily, Crestor 40 mg daily  4.  Continue on amlodipine 2.5 mg for a year  5.  Need repeat fasting lipids   Follow-up in 6 months         History of Present Illness/Subjective    HPI: Holly Beal is a 69 year old female with history of coronary artery disease status post 5 vessel CABG with LIMA-LAD, RA-OM1, SVG-diagonal, SVG-OM 3, SVG-RPDA by Dr. Fuentes on 10/25/2024, diabetes mellitus type 2, dyslipidemia, anxiety/depression who I am seeing today in follow-up.  She has been participating in cardiac rehab.  She has been walking a lot and plans on getting a treadmill for about continue with exercise.  She would like to change back to lisinopril once she is done with amlodipine that she is currently on for her radial graft.  No exertional chest discomfort and no shortness of breath.  No significant edema on exam.  She denies any palpitations.  She did experience redness around her radial artery incision site with some oozing and this has resolved with antibiotics.    The longitudinal plan of care for the diagnosis(es)/condition(s) as documented were addressed during this visit. Due to the added complexity in care, I will continue to support Holly in the subsequent management and with ongoing continuity of care.      Physical Examination  Review of Systems   Vitals: /72 (BP Location: Left arm, Patient Position:  "Sitting, Cuff Size: Adult Regular)   Pulse 59   Resp 17   Ht 1.676 m (5' 6\")   Wt 70.3 kg (155 lb)   BMI 25.02 kg/m    BMI= Body mass index is 25.02 kg/m .  Wt Readings from Last 3 Encounters:   24 70.3 kg (155 lb)   12/10/24 69.9 kg (154 lb)   24 71.2 kg (157 lb)       General Appearance:   no distress, normal body habitus   ENT/Mouth: membranes moist, no oral lesions or bleeding gums.      EYES:  no scleral icterus, normal conjunctivae   Neck: no carotid bruits or thyromegaly   Chest/Lungs:   lungs are clear to auscultation, incision site well-healing   Cardiovascular:   Regular. Normal first and second heart sounds with no murmur no edema bilaterally        Extremities: no cyanosis or clubbing   Skin: no xanthelasma, warm.    Neurologic: normal  bilateral, no tremors     Psychiatric: alert and oriented x3, calm        Please refer above for cardiac ROS details.        Medical History  Surgical History Family History Social History   Past Medical History:   Diagnosis Date    Asthma     Diabetes mellitus, type 2 (H)      Past Surgical History:   Procedure Laterality Date     SECTION  ,    CORONARY ARTERY BYPASS GRAFT, WITH ENDOSCOPIC VESSEL PROCUREMENT N/A 10/25/2024    Procedure: CORONARY ARTERY BYPASS GRAFT TIMES FIVE, LEFT INTERNAL MAMMARY ARTERY HARVEST, LEFT AND RIGHT LEG ENDOSCOPIC VESSEL PROCUREMENT,;  Surgeon: Rachel Fuentes MD;  Location: [facility] Main OR    CV CORONARY ANGIOGRAM N/A 10/23/2024    Procedure: Coronary Angiogram;  Surgeon: Tyrone Pendleton MD;  Location: [facility] CATH LAB CV    CV LEFT HEART CATH N/A 10/23/2024    Procedure: Left Heart Catheterization;  Surgeon: Tyrone Pendleton MD;  Location: [facility] CATH LAB CV    IR NEPHROLITHOTOMY  10/22/2015    OTHER SURGICAL HISTORY      Esophagus line surgeryremoval of guide wire    PROCURE ARTERY RADIAL Left 10/25/2024    Procedure: LEFT RADIAL ARTERY HARVEST;  Surgeon: Rachel Fuentes MD;  " Location: [facility] OR    TRANSESOPHAGEAL ECHOCARDIOGRAM INTRAOPERATIVE  10/25/2024    Procedure: EPIAORTIC ULTRASOUND, ANESTHESIA TRANSESOPHAGEAL ECHOCARDIOGRAM;  Surgeon: Rachel Fuentes MD;  Location: [facility] OR     No family history on file.     Social History     Socioeconomic History    Marital status:      Spouse name: Not on file    Number of children: Not on file    Years of education: Not on file    Highest education level: Not on file   Occupational History    Not on file   Tobacco Use    Smoking status: Never    Smokeless tobacco: Never   Substance and Sexual Activity    Alcohol use: Yes     Comment: Alcoholic Drinks/day: rare    Drug use: No    Sexual activity: Not on file   Other Topics Concern    Not on file   Social History Narrative    Not on file     Social Drivers of Health     Financial Resource Strain: Low Risk  (10/23/2024)    Financial Resource Strain     Within the past 12 months, have you or your family members you live with been unable to get utilities (heat, electricity) when it was really needed?: No   Food Insecurity: Low Risk  (10/23/2024)    Food Insecurity     Within the past 12 months, did you worry that your food would run out before you got money to buy more?: No     Within the past 12 months, did the food you bought just not last and you didn t have money to get more?: No   Transportation Needs: Low Risk  (10/23/2024)    Transportation Needs     Within the past 12 months, has lack of transportation kept you from medical appointments, getting your medicines, non-medical meetings or appointments, work, or from getting things that you need?: No   Physical Activity: Not on file   Stress: Not on file   Social Connections: Not on file   Interpersonal Safety: High Risk (10/23/2024)    Interpersonal Safety     Do you feel physically and emotionally safe where you currently live?: No     Within the past 12 months, have you been hit, slapped, kicked or otherwise  physically hurt by someone?: No     Within the past 12 months, have you been humiliated or emotionally abused in other ways by your partner or ex-partner?: No   Housing Stability: Low Risk  (10/23/2024)    Housing Stability     Do you have housing? : Yes     Are you worried about losing your housing?: No           Medications  Allergies   Current Outpatient Medications   Medication Sig Dispense Refill    acetaminophen (TYLENOL) 500 MG tablet Take 1-2 tablets (500-1,000 mg) by mouth every 6 hours as needed for mild pain.      albuterol (PROVENTIL HFA;VENTOLIN HFA) 90 mcg/actuation inhaler [ALBUTEROL (PROVENTIL HFA;VENTOLIN HFA) 90 MCG/ACTUATION INHALER] Inhale 2 puffs every 6 (six) hours as needed for wheezing or shortness of breath.      Alpha Lipoic Ac-Biotin-Keratin (BIOTIN-KERATIN-ALPHA LIPOIC AC PO) Take 1 capsule by mouth daily.      ALPRAZolam (XANAX) 0.5 MG tablet [ALPRAZOLAM (XANAX) 0.5 MG TABLET] Take 0.5 mg by mouth 3 (three) times a day as needed for anxiety.      amLODIPine (NORVASC) 2.5 MG tablet Take 1 tablet (2.5 mg) by mouth daily. 30 tablet 5    aspirin (ASA) 81 MG chewable tablet Take 2 tablets (162 mg) by mouth daily. 60 tablet 3    AZO-CRANBERRY PO Take 1 tablet by mouth three times a week.      cephALEXin (KEFLEX) 500 MG capsule Take 500 mg by mouth daily as needed (as needed for kidney stones).      cyanocobalamin 1000 MCG tablet [CYANOCOBALAMIN 1000 MCG TABLET] Take 1,000 mcg by mouth daily.      fluticasone (FLONASE) 50 mcg/actuation nasal spray [FLUTICASONE (FLONASE) 50 MCG/ACTUATION NASAL SPRAY] 2 sprays into each nostril daily as needed for rhinitis.      metFORMIN (GLUCOPHAGE XR) 500 MG 24 hr tablet Take 500 mg by mouth daily (with dinner).      metoprolol tartrate (LOPRESSOR) 100 MG tablet Take 1 tablet (100 mg) by mouth 2 times daily. 60 tablet 3    omeprazole (PRILOSEC) 20 MG capsule [OMEPRAZOLE (PRILOSEC) 20 MG CAPSULE] Take 20 mg by mouth every evening.       Probiotic Product  "(PROBIOTIC 10 ULTRA STRENGTH PO) Take 1 capsule by mouth three times a week. Alternates  with AZO Cranberry.      rosuvastatin (CRESTOR) 40 MG tablet Take 1 tablet (40 mg) by mouth daily. 30 tablet 3    triamcinolone (ARISTOCORT HP) 0.5 % external cream Apply 1 Application topically as needed for irritation.         Allergies   Allergen Reactions    Atorvastatin Nausea    Simvastatin Nausea          Lab Results    Chemistry/lipid CBC Cardiac Enzymes/BNP/TSH/INR   Recent Labs   Lab Test 05/06/24  1111   CHOL 187   HDL 51   *   TRIG 136     Recent Labs   Lab Test 05/06/24  1111 07/05/23  1235 07/15/22  1543   * 129* 116*     Recent Labs   Lab Test 10/31/24  0745 10/31/24  0430   NA  --  140   POTASSIUM  --  4.5   CHLORIDE  --  97*   CO2  --  30*   * 232*   BUN  --  18.1   CR  --  0.82   GFRESTIMATED  --  77   SHAQUILLE  --  10.0     Recent Labs   Lab Test 10/31/24  0430 10/30/24  0432 10/29/24  0420   CR 0.82 0.90 0.76     Recent Labs   Lab Test 10/23/24  1635   A1C 7.1*          Recent Labs   Lab Test 10/29/24  0420 10/28/24  0457   WBC 11.0 14.5*   HGB  --  9.4*   HCT  --  28.7*   MCV  --  90    186     Recent Labs   Lab Test 10/28/24  0457 10/27/24  0428 10/26/24  0411   HGB 9.4* 9.3* 8.9*    No results for input(s): \"TROPONINI\" in the last 64912 hours.  No results for input(s): \"BNP\", \"NTBNPI\", \"NTBNP\" in the last 55587 hours.  Recent Labs   Lab Test 11/07/23  1644   TSH 1.97     Recent Labs   Lab Test 10/25/24  1327 10/25/24  1130 10/24/24  0950   INR 1.31* 1.43* 1.06        Olga Godwin MD                                      "

## 2024-12-16 ENCOUNTER — LAB (OUTPATIENT)
Dept: CARDIOLOGY | Facility: CLINIC | Age: 69
End: 2024-12-16
Payer: COMMERCIAL

## 2024-12-16 DIAGNOSIS — E78.5 DYSLIPIDEMIA: ICD-10-CM

## 2024-12-16 LAB
CHOLEST SERPL-MCNC: 97 MG/DL
FASTING STATUS PATIENT QL REPORTED: YES
HDLC SERPL-MCNC: 40 MG/DL
LDLC SERPL CALC-MCNC: 28 MG/DL
NONHDLC SERPL-MCNC: 57 MG/DL
TRIGL SERPL-MCNC: 146 MG/DL

## 2024-12-16 PROCEDURE — 80061 LIPID PANEL: CPT

## 2024-12-16 PROCEDURE — 36415 COLL VENOUS BLD VENIPUNCTURE: CPT

## 2024-12-30 ENCOUNTER — TELEPHONE (OUTPATIENT)
Dept: CARDIOLOGY | Facility: CLINIC | Age: 69
End: 2024-12-30
Payer: COMMERCIAL

## 2024-12-30 NOTE — TELEPHONE ENCOUNTER
Spoke with pt and she ok'd to come in for wound check on Thursday 1/2 at 12:00pm. Pt ok'd date, time and location

## 2024-12-31 ENCOUNTER — HOSPITAL ENCOUNTER (OUTPATIENT)
Dept: CARDIAC REHAB | Facility: HOSPITAL | Age: 69
Discharge: HOME | End: 2024-12-31
Attending: INTERNAL MEDICINE
Payer: COMMERCIAL

## 2024-12-31 PROCEDURE — 93798 PHYS/QHP OP CAR RHAB W/ECG: CPT

## 2025-01-02 ENCOUNTER — OFFICE VISIT (OUTPATIENT)
Dept: CARDIOLOGY | Facility: CLINIC | Age: 70
End: 2025-01-02
Payer: COMMERCIAL

## 2025-01-02 VITALS
BODY MASS INDEX: 24.86 KG/M2 | DIASTOLIC BLOOD PRESSURE: 70 MMHG | WEIGHT: 154 LBS | SYSTOLIC BLOOD PRESSURE: 128 MMHG | HEART RATE: 70 BPM | RESPIRATION RATE: 16 BRPM

## 2025-01-02 DIAGNOSIS — T81.41XA SUPERFICIAL INCISIONAL SURGICAL SITE INFECTION: Primary | ICD-10-CM

## 2025-01-02 RX ORDER — SULFAMETHOXAZOLE AND TRIMETHOPRIM 800; 160 MG/1; MG/1
1 TABLET ORAL 2 TIMES DAILY
Qty: 14 TABLET | Refills: 0 | Status: SHIPPED | OUTPATIENT
Start: 2025-01-02

## 2025-01-02 NOTE — PROGRESS NOTES
BRIEF CV SURGERY CLINIC NOTE     Reason for Visit: Holly Beal is a 69 year old female who is s/p CABGx5 with Dr. Fuentes on 10/25/2024. Patient's hospital course was remarkable only for tx for possible UTI. She was discharged to home. Pt. called our office w/ concerns of ongoing redness and drainage at her radial graft site. She was seen by us twice previously for the same concern and has completed 2 courses of keflex 500 mg QID. Pt is seen in clinic today for a wound check.      S: Patient reports the wound has consistently had a little lump by it the size of a pea as well as some redness around it. Denies spreading erythema, purulent drainage, or fevers. Does endorse some clear drainage.     O: /70 (BP Location: Left arm, Patient Position: Sitting, Cuff Size: Adult Regular)   Pulse 70   Resp 16   Wt 69.9 kg (154 lb)   BMI 24.86 kg/m       Exam:  Constitutional: NAD, pleasant, conversant.  Cardiovascular: RRR. 2+ pulses in all extremities  Respiratory: Nonlabored effort on room air.  Incision: Left wrist incision next to radial harvest with 3mm area of fluctuance surrounded by some induration.  Neurologic: A&Ox3, CLEMENTS, CN grossly intact.           A/P: Holly Beal is a 69 year oldfemale s/p CABG x5 on 10/25/2024 presenting w/ c/o radial harvest site infection.      - Superficial abscess and cellulitis.  - I&D performed in clinic today. Patient's left wrist was cleansed w/ chlorhexidene and approx. 2mL lidocaine was injected intradermally with drawback to ensure no intravenous injection. Wrist was resterilized w/ chlorhexidene and was allowed to dry. #15 blade was used to make a small 3mm incision superficially. A small amount (approx 1mL) purulent fluid was expressed followed by a small amount of serosanguinous fluid. The wrist was irrigated w/ 10mL sterile saline and covered w/ a bandaid.   - Plan to treat w/ 7 days bactrim as patient has completed 2 courses of keflex already.  - Leave open to air  unless draining onto clothes, then can cover w/ bandaid.  - Call if the site develops additional drainage, the redness worsens, or if it does not start to improve.  - Hemodynamics are stable. No additional medication changes were needed today.        Suzie Barahona PA-C  [facility] Cardiothoracic Surgery  Vocera or Secure Chat  January 2, 2025

## 2025-01-07 ENCOUNTER — HOSPITAL ENCOUNTER (OUTPATIENT)
Dept: CARDIAC REHAB | Facility: HOSPITAL | Age: 70
Discharge: HOME | End: 2025-01-07
Attending: INTERNAL MEDICINE
Payer: COMMERCIAL

## 2025-01-07 PROCEDURE — 93798 PHYS/QHP OP CAR RHAB W/ECG: CPT

## 2025-02-15 ENCOUNTER — HEALTH MAINTENANCE LETTER (OUTPATIENT)
Age: 70
End: 2025-02-15

## 2025-02-28 ENCOUNTER — TELEPHONE (OUTPATIENT)
Dept: CARDIOLOGY | Facility: CLINIC | Age: 70
End: 2025-02-28
Payer: COMMERCIAL

## 2025-02-28 DIAGNOSIS — Z95.1 S/P CABG (CORONARY ARTERY BYPASS GRAFT): ICD-10-CM

## 2025-02-28 NOTE — TELEPHONE ENCOUNTER
Health Call Center    Phone Message    May a detailed message be left on voicemail: yes     Reason for Call: Medication Refill Request    Has the patient contacted the pharmacy for the refill? Yes   Name of medication being requested: rosuvastatin,  metoprolol   Provider who prescribed the medication: Iveth Jin   Pharmacy:     [facility] pharmacy [id number] [facility] ph:[phone]   Date medication is needed: asap, pt is out. She called pharmacy last Saturday asking for refills from. Patient walked into pharmacy to  today and she was told [facility] had not started the  MD authorization until today. Patient is hoping you can expedite this  As she was not aware of [facility]' slow process time. PLEASE CALL PATIENT when rx is sent to [facility]     Action Taken: Other: cardio    Travel Screening: Not Applicable     Date of Service:

## 2025-03-03 RX ORDER — METOPROLOL TARTRATE 100 MG/1
100 TABLET ORAL 2 TIMES DAILY
Qty: 180 TABLET | Refills: 0 | Status: SHIPPED | OUTPATIENT
Start: 2025-03-03

## 2025-03-03 RX ORDER — ROSUVASTATIN CALCIUM 40 MG/1
40 TABLET, COATED ORAL DAILY
Qty: 90 TABLET | Refills: 0 | Status: SHIPPED | OUTPATIENT
Start: 2025-03-03

## 2025-03-03 NOTE — TELEPHONE ENCOUNTER
[facility] Call Center    Phone Message    May a detailed message be left on voicemail: yes     Reason for Call: Other: Pt calling in for an update. Encounter being re-routed to cardiology. Pt is completely out of both medications, please review and send to [facility] in [place]. Thank you!     Action Taken: Message routed to:  Other: [facility] [[id number]]    Travel Screening: Not Applicable     Date of Service:

## 2025-03-03 NOTE — TELEPHONE ENCOUNTER
Last seen by CLL 12/13/24:  3. Continue on metoprolol tartrate 100 mg twice daily, Crestor 40 mg daily   Follow-up in 6 months    ==  Follow-up with CLL pending 6/2025. Has follow-up with CV Surgery 3/12/25. Phone call to pt. Confirmed pharmacy and sent 90 day supply per request. Transferred to scheduling to arrange follow-up with CLL. LISA

## 2025-03-07 ENCOUNTER — TRANSFERRED RECORDS (OUTPATIENT)
Dept: HEALTH INFORMATION MANAGEMENT | Facility: CLINIC | Age: 70
End: 2025-03-07

## 2025-03-07 LAB — PHQ9 SCORE: 0

## 2025-03-12 RX ORDER — CEPHALEXIN 500 MG/1
500 CAPSULE ORAL 4 TIMES DAILY
Qty: 28 CAPSULE | Refills: 0 | OUTPATIENT
Start: 2025-03-12

## 2025-05-18 ENCOUNTER — HEALTH MAINTENANCE LETTER (OUTPATIENT)
Age: 70
End: 2025-05-18

## 2025-05-29 DIAGNOSIS — Z95.1 S/P CABG (CORONARY ARTERY BYPASS GRAFT): ICD-10-CM

## 2025-05-29 RX ORDER — METOPROLOL TARTRATE 100 MG/1
100 TABLET ORAL 2 TIMES DAILY
Qty: 180 TABLET | Refills: 2 | Status: SHIPPED | OUTPATIENT
Start: 2025-05-29

## 2025-05-29 RX ORDER — ROSUVASTATIN CALCIUM 40 MG/1
40 TABLET, COATED ORAL DAILY
Qty: 90 TABLET | Refills: 2 | Status: SHIPPED | OUTPATIENT
Start: 2025-05-29

## 2025-06-18 ENCOUNTER — TRANSFERRED RECORDS (OUTPATIENT)
Dept: HEALTH INFORMATION MANAGEMENT | Facility: CLINIC | Age: 70
End: 2025-06-18

## 2025-06-18 LAB — HBA1C MFR BLD: 7.5 % (ref 4.2–6.1)

## 2025-06-23 ENCOUNTER — OFFICE VISIT (OUTPATIENT)
Dept: CARDIOLOGY | Facility: CLINIC | Age: 70
End: 2025-06-23
Attending: INTERNAL MEDICINE
Payer: COMMERCIAL

## 2025-06-23 VITALS
HEART RATE: 48 BPM | DIASTOLIC BLOOD PRESSURE: 56 MMHG | SYSTOLIC BLOOD PRESSURE: 130 MMHG | RESPIRATION RATE: 16 BRPM | HEIGHT: 66 IN | BODY MASS INDEX: 24.59 KG/M2 | WEIGHT: 153 LBS

## 2025-06-23 DIAGNOSIS — I25.10 CORONARY ARTERY DISEASE INVOLVING NATIVE CORONARY ARTERY OF NATIVE HEART WITHOUT ANGINA PECTORIS: ICD-10-CM

## 2025-06-23 DIAGNOSIS — E78.5 DYSLIPIDEMIA: ICD-10-CM

## 2025-06-23 DIAGNOSIS — R00.1 SINUS BRADYCARDIA: Primary | ICD-10-CM

## 2025-06-23 LAB
ATRIAL RATE - MUSE: 48 BPM
DIASTOLIC BLOOD PRESSURE - MUSE: NORMAL MMHG
INTERPRETATION ECG - MUSE: NORMAL
P AXIS - MUSE: 50 DEGREES
PR INTERVAL - MUSE: 160 MS
QRS DURATION - MUSE: 132 MS
QT - MUSE: 468 MS
QTC - MUSE: 418 MS
R AXIS - MUSE: -56 DEGREES
SYSTOLIC BLOOD PRESSURE - MUSE: NORMAL MMHG
T AXIS - MUSE: 31 DEGREES
VENTRICULAR RATE- MUSE: 48 BPM

## 2025-06-23 PROCEDURE — 3075F SYST BP GE 130 - 139MM HG: CPT | Performed by: INTERNAL MEDICINE

## 2025-06-23 PROCEDURE — 99214 OFFICE O/P EST MOD 30 MIN: CPT | Performed by: INTERNAL MEDICINE

## 2025-06-23 PROCEDURE — 3078F DIAST BP <80 MM HG: CPT | Performed by: INTERNAL MEDICINE

## 2025-06-23 PROCEDURE — G2211 COMPLEX E/M VISIT ADD ON: HCPCS | Performed by: INTERNAL MEDICINE

## 2025-06-23 PROCEDURE — 93000 ELECTROCARDIOGRAM COMPLETE: CPT | Performed by: INTERNAL MEDICINE

## 2025-06-23 RX ORDER — METOPROLOL SUCCINATE 50 MG/1
50 TABLET, EXTENDED RELEASE ORAL DAILY
Qty: 90 TABLET | Refills: 3 | Status: SHIPPED | OUTPATIENT
Start: 2025-06-23

## 2025-06-23 RX ORDER — LISINOPRIL 10 MG/1
10 TABLET ORAL DAILY
Qty: 90 TABLET | Refills: 3 | Status: SHIPPED | OUTPATIENT
Start: 2025-06-23

## 2025-06-23 NOTE — PATIENT INSTRUCTIONS
Ms. Alfredhy WILLIAM Beal,     It was a pleasure to see you in the office today. My recommendations for you include:   1. Stop metoprolol tartrate 100 mg twice a day  2. Start toprol X 50 mg daily  3. Start lisinopril 10 mg daily  4. Goal blood pressure < 140/90      Please do not hesitate to call the [facility] clinic with any questions or concerns at [phone].    Sincerely,     Olga Godwin MD

## 2025-06-23 NOTE — LETTER
6/23/2025    Cj Perez MD  [address]  [facility] [address]    RE: Holly Beal       Dear Colleague,     I had the pleasure of seeing Holly Beal in the [facility].    HEART CARE ENCOUNTER CONSULTATON NOTE      [facility]  [phone]      Assessment/Recommendations   Assessment:  1.  Coronary artery disease status post 5 vessel CABG with LIMA-LAD, RA-OM1, SVG-diagonal, SVG-OM 3, SVG-RPDA by Dr. Fuentes on 10/25/2024 recovering well without anginal complaints  2.  Dyslipidemia: Goal LDL of less than 70.  Lipids well-controlled.  Continue on high-dose statin therapy  3.  Diabetes mellitus type 2: Management per primary team  4.  Anxiety/depression  5.  Lightheadedness: May be related to high dose metoprolol.  Also bradycardic today.  Will adjust metoprolol dosing to Toprol-XL 50 mg daily from metoprolol tartrate 100 mg twice daily.  If continued symptoms/bradycardia we will proceed with a ZIO.  Start on low-dose lisinopril to ensure that blood pressure remains well-controlled.     Plan:  1.  Continue aspirin 81 mg daily  2.  Stop metoprolol tartrate and start on Toprol-XL 50 mg daily  3.  Start lisinopril 10 mg daily  4.  Continue on amlodipine 2.5 mg for a year post CAB  5.  Continue Crestor  Follow-up in 6 months         History of Present Illness/Subjective    HPI: Holly Beal is a 70 year old female with history of coronary artery disease status post 5 vessel CABG with LIMA-LAD, RA-OM1, SVG-diagonal, SVG-OM 3, SVG-RPDA by Dr. Fuentes on 10/25/2024, diabetes mellitus type 2, dyslipidemia, anxiety/depression who I am seeing today in follow-up.  On occasion she notes feeling a little faint if she bends over to do something.  She denies any shortness of breath.  Overall feeling great.  She went to [place] on a trip with her family about a month ago and did a lot of walking and felt great with the activity.  Occasional slight chest pain  "but nothing prolonged and it feels different from the chest pain she had prior to her bypass surgery.  She has developed keloid scar formation on her left wrist and is going to be seeing a dermatologist to evaluate for options.     The longitudinal plan of care for the diagnosis(es)/condition(s) as documented were addressed during this visit. Due to the added complexity in care, I will continue to support Holly in the subsequent management and with ongoing continuity of care.        Physical Examination  Review of Systems   Vitals: /56 (BP Location: Right arm, Patient Position: Sitting, Cuff Size: Adult Regular)   Pulse (!) 48   Resp 16   Ht 1.676 m (5' 6\")   Wt 69.4 kg (153 lb)   BMI 24.69 kg/m    BMI= Body mass index is 24.69 kg/m .  Wt Readings from Last 3 Encounters:   25 69.4 kg (153 lb)   25 69.4 kg (153 lb)   25 69.9 kg (154 lb)       General Appearance:   no distress, normal body habitus   ENT/Mouth: membranes moist, no oral lesions or bleeding gums.      EYES:  no scleral icterus, normal conjunctivae       Chest/Lungs:   lungs are clear to auscultation   Cardiovascular:   Regular. Normal first and second heart sounds with no murmur no edema bilaterally        Extremities: no cyanosis or clubbing   Skin: no xanthelasma, warm.    Neurologic: normal  bilateral, no tremors     Psychiatric: alert and oriented x3, calm        Please refer above for cardiac ROS details.        Medical History  Surgical History Family History Social History   Past Medical History:   Diagnosis Date     Asthma      Diabetes mellitus, type 2 (H)      Past Surgical History:   Procedure Laterality Date      SECTION       CORONARY ARTERY BYPASS GRAFT, WITH ENDOSCOPIC VESSEL PROCUREMENT N/A 10/25/2024    Procedure: CORONARY ARTERY BYPASS GRAFT TIMES FIVE, LEFT INTERNAL MAMMARY ARTERY HARVEST, LEFT AND RIGHT LEG ENDOSCOPIC VESSEL PROCUREMENT,;  Surgeon: Rachel Fuentes MD;  [facility]: [facility] OR     CV CORONARY ANGIOGRAM N/A 10/23/2024    Procedure: Coronary Angiogram;  Surgeon: Tyrone Pendleton MD;  Location: [facility] LAB CV     CV LEFT HEART CATH N/A 10/23/2024    Procedure: Left Heart Catheterization;  Surgeon: Tyrone Pendleton MD;  Location: [facility] LAB CV     IR NEPHROLITHOTOMY  10/22/2015     OTHER SURGICAL HISTORY  2000    Esophagus line surgeryremoval of guide wire     PROCURE ARTERY RADIAL Left 10/25/2024    Procedure: LEFT RADIAL ARTERY HARVEST;  Surgeon: Rachel Fuentes MD;  Location: [facility] OR     TRANSESOPHAGEAL ECHOCARDIOGRAM INTRAOPERATIVE  10/25/2024    Procedure: EPIAORTIC ULTRASOUND, ANESTHESIA TRANSESOPHAGEAL ECHOCARDIOGRAM;  Surgeon: Rachel Fuentes MD;  Location: [facility] OR     No family history on file.     Social History     Socioeconomic History     Marital status:      Spouse name: Not on file     Number of children: Not on file     Years of education: Not on file     Highest education level: Not on file   Occupational History     Not on file   Tobacco Use     Smoking status: Never     Smokeless tobacco: Never   Substance and Sexual Activity     Alcohol use: Yes     Comment: Alcoholic Drinks/day: rare     Drug use: No     Sexual activity: Not on file   Other Topics Concern     Not on file   Social History Narrative     Not on file     Social Drivers of Health     Financial Resource Strain: Low Risk  (10/23/2024)    Financial Resource Strain      Within the past 12 months, have you or your family members you live with been unable to get utilities (heat, electricity) when it was really needed?: No   Food Insecurity: Low Risk  (10/23/2024)    Food Insecurity      Within the past 12 months, did you worry that your food would run out before you got money to buy more?: No      Within the past 12 months, did the food you bought just not last and you didn t have money to get more?: No   Transportation Needs: Low Risk  (10/23/2024)     Transportation Needs      Within the past 12 months, has lack of transportation kept you from medical appointments, getting your medicines, non-medical meetings or appointments, work, or from getting things that you need?: No   Physical Activity: Not on file   Stress: Not on file   Social Connections: Not on file   Interpersonal Safety: High Risk (10/23/2024)    Interpersonal Safety      Do you feel physically and emotionally safe where you currently live?: No      Within the past 12 months, have you been hit, slapped, kicked or otherwise physically hurt by someone?: No      Within the past 12 months, have you been humiliated or emotionally abused in other ways by your partner or ex-partner?: No   Housing Stability: Low Risk  (10/23/2024)    Housing Stability      Do you have housing? : Yes      Are you worried about losing your housing?: No           Medications  Allergies   Current Outpatient Medications   Medication Sig Dispense Refill     Alpha Lipoic Ac-Biotin-Keratin (BIOTIN-KERATIN-ALPHA LIPOIC AC PO) Take 1 capsule by mouth daily.       ALPRAZolam (XANAX) 0.5 MG tablet [ALPRAZOLAM (XANAX) 0.5 MG TABLET] Take 0.5 mg by mouth 3 (three) times a day as needed for anxiety.       amLODIPine (NORVASC) 2.5 MG tablet Take 1 tablet (2.5 mg) by mouth daily. 30 tablet 5     aspirin (ASA) 81 MG chewable tablet Take 2 tablets (162 mg) by mouth daily. 60 tablet 3     AZO-CRANBERRY PO Take 1 tablet by mouth three times a week.       cephALEXin (KEFLEX) 500 MG capsule Take 500 mg by mouth daily as needed (as needed for kidney stones).       cyanocobalamin 1000 MCG tablet [CYANOCOBALAMIN 1000 MCG TABLET] Take 1,000 mcg by mouth daily.       fluticasone (FLONASE) 50 mcg/actuation nasal spray [FLUTICASONE (FLONASE) 50 MCG/ACTUATION NASAL SPRAY] 2 sprays into each nostril daily as needed for rhinitis.       lisinopril (ZESTRIL) 10 MG tablet Take 1 tablet (10 mg) by mouth daily. 90 tablet 3     metFORMIN (GLUCOPHAGE XR) 500 MG 24  hr tablet Take 500 mg by mouth daily (with dinner). (Patient taking differently: Take 500 mg by mouth 2 times daily (with meals).)       metoprolol succinate ER (TOPROL XL) 50 MG 24 hr tablet Take 1 tablet (50 mg) by mouth daily. 90 tablet 3     omeprazole (PRILOSEC) 20 MG capsule [OMEPRAZOLE (PRILOSEC) 20 MG CAPSULE] Take 20 mg by mouth every evening.        Probiotic Product (PROBIOTIC 10 ULTRA STRENGTH PO) Take 1 capsule by mouth three times a week. Alternates  with AZO Cranberry.       rosuvastatin (CRESTOR) 40 MG tablet TAKE 1 TABLET BY MOUTH EVERY DAY 90 tablet 2     triamcinolone (ARISTOCORT HP) 0.5 % external cream Apply 1 Application topically as needed for irritation.       acetaminophen (TYLENOL) 500 MG tablet Take 1-2 tablets (500-1,000 mg) by mouth every 6 hours as needed for mild pain.       albuterol (PROVENTIL HFA;VENTOLIN HFA) 90 mcg/actuation inhaler [ALBUTEROL (PROVENTIL HFA;VENTOLIN HFA) 90 MCG/ACTUATION INHALER] Inhale 2 puffs every 6 (six) hours as needed for wheezing or shortness of breath. (Patient not taking: Reported on 6/23/2025)         Allergies   Allergen Reactions     Atorvastatin Nausea     Simvastatin Nausea          Lab Results    Chemistry/lipid CBC Cardiac Enzymes/BNP/TSH/INR   Recent Labs   Lab Test 12/16/24  0806   CHOL 97   HDL 40*   LDL 28   TRIG 146     Recent Labs   Lab Test 12/16/24  0806 05/06/24  1111 07/05/23  1235   LDL 28 109* 129*     Recent Labs   Lab Test 10/31/24  0745 10/31/24  0430   NA  --  140   POTASSIUM  --  4.5   CHLORIDE  --  97*   CO2  --  30*   * 232*   BUN  --  18.1   CR  --  0.82   GFRESTIMATED  --  77   SHAQUILLE  --  10.0     Recent Labs   Lab Test 10/31/24  0430 10/30/24  0432 10/29/24  0420   CR 0.82 0.90 0.76     Recent Labs   Lab Test 10/23/24  1635   A1C 7.1*          Recent Labs   Lab Test 10/29/24  0420 10/28/24  0457   WBC 11.0 14.5*   HGB  --  9.4*   HCT  --  28.7*   MCV  --  90    186     Recent Labs   Lab Test 10/28/24  0458  "10/27/24  0428 10/26/24  0411   HGB 9.4* 9.3* 8.9*    No results for input(s): \"TROPONINI\" in the last 55141 hours.  No results for input(s): \"BNP\", \"NTBNPI\", \"NTBNP\" in the last 69964 hours.  Recent Labs   Lab Test 11/07/23  1644   TSH 1.97     Recent Labs   Lab Test 10/25/24  1327 10/25/24  1130 10/24/24  0950   INR 1.31* 1.43* 1.06        Olga Godwin MD                                        Thank you for allowing me to participate in the care of your patient.      Sincerely,     Olga Godwin MD     [facility]  cc:   Olga Godwin MD  [address]  [place] [address]      "

## 2025-06-26 ENCOUNTER — TELEPHONE (OUTPATIENT)
Dept: CARDIOLOGY | Facility: CLINIC | Age: 70
End: 2025-06-26
Payer: COMMERCIAL

## 2025-06-26 DIAGNOSIS — R42 LIGHTHEADEDNESS: ICD-10-CM

## 2025-06-26 DIAGNOSIS — R07.9 CHEST PAIN: Primary | ICD-10-CM

## 2025-06-26 NOTE — TELEPHONE ENCOUNTER
Dr. Godwin,  Patient wishes to proceed with stress test and Zio that were discussed at OV 6/23/25.  Any new orders?  Thank you,  Debra

## 2025-06-26 NOTE — TELEPHONE ENCOUNTER
[facility] Call Center    Phone Message    May a detailed message be left on voicemail: yes     Reason for Call: Other: Pt is calling to inform that she would like to do the stress test as discussed on the 6/23/25 OV. Please call to discuss     Action Taken: Other: cardio    Travel Screening: Not Applicable    Thank you!  [facility]       Date of Service:

## 2025-06-30 NOTE — TELEPHONE ENCOUNTER
PC to patient and informed the stress test and Zio orders placed. Call transferred to scheduling to arrange Lexiscan and informed patient the Zio will be mailed to her confirmed address. No further questions at this time.  Olga Godwin MD to Me (Selected Message)        6/30/25  1:07 PM  Yes can proceed with 7-day ZIO monitor indication is lightheadedness and order Lexiscan nuclear stress test indication CAD/chest pain

## 2025-07-01 ENCOUNTER — ORDERS ONLY (AUTO-RELEASED) (OUTPATIENT)
Dept: CARDIOLOGY | Facility: CLINIC | Age: 70
End: 2025-07-01
Payer: COMMERCIAL

## 2025-07-01 DIAGNOSIS — R42 LIGHTHEADEDNESS: ICD-10-CM

## 2025-07-07 ENCOUNTER — HOSPITAL ENCOUNTER (OUTPATIENT)
Dept: NUCLEAR MEDICINE | Facility: HOSPITAL | Age: 70
Discharge: HOME | End: 2025-07-07
Attending: INTERNAL MEDICINE
Payer: COMMERCIAL

## 2025-07-07 ENCOUNTER — HOSPITAL ENCOUNTER (OUTPATIENT)
Dept: CARDIOLOGY | Facility: HOSPITAL | Age: 70
Discharge: HOME | End: 2025-07-07
Attending: INTERNAL MEDICINE
Payer: COMMERCIAL

## 2025-07-07 DIAGNOSIS — R07.9 CHEST PAIN: ICD-10-CM

## 2025-07-07 LAB
CV STRESS CURRENT BP HE: NORMAL
CV STRESS CURRENT HR HE: 104
CV STRESS CURRENT HR HE: 105
CV STRESS CURRENT HR HE: 108
CV STRESS CURRENT HR HE: 109
CV STRESS CURRENT HR HE: 109
CV STRESS CURRENT HR HE: 110
CV STRESS CURRENT HR HE: 113
CV STRESS CURRENT HR HE: 117
CV STRESS CURRENT HR HE: 121
CV STRESS CURRENT HR HE: 126
CV STRESS CURRENT HR HE: 126
CV STRESS CURRENT HR HE: 133
CV STRESS CURRENT HR HE: 134
CV STRESS CURRENT HR HE: 65
CV STRESS CURRENT HR HE: 68
CV STRESS DEVIATION TIME HE: NORMAL
CV STRESS ECHO PERCENT HR HE: NORMAL
CV STRESS EXERCISE STAGE HE: NORMAL
CV STRESS FINAL RESTING BP HE: NORMAL
CV STRESS FINAL RESTING HR HE: 104
CV STRESS MAX HR HE: 135
CV STRESS MAX TREADMILL GRADE HE: 0
CV STRESS MAX TREADMILL SPEED HE: 0
CV STRESS PEAK DIA BP HE: NORMAL
CV STRESS PEAK SYS BP HE: NORMAL
CV STRESS PHASE HE: NORMAL
CV STRESS PROTOCOL HE: NORMAL
CV STRESS RESTING PT POSITION HE: NORMAL
CV STRESS ST DEVIATION AMOUNT HE: NORMAL
CV STRESS ST DEVIATION ELEVATION HE: NORMAL
CV STRESS ST EVELATION AMOUNT HE: NORMAL
CV STRESS TEST TYPE HE: NORMAL
CV STRESS TOTAL STAGE TIME MIN 1 HE: NORMAL
RATE PRESSURE PRODUCT: NORMAL
STRESS ECHO BASELINE DIASTOLIC HE: 78
STRESS ECHO BASELINE HR: 63
STRESS ECHO BASELINE SYSTOLIC BP: 191
STRESS ECHO CALCULATED PERCENT HR: 89 %
STRESS ECHO LAST STRESS DIASTOLIC BP: 74
STRESS ECHO LAST STRESS HR: 117
STRESS ECHO LAST STRESS SYSTOLIC BP: 171
STRESS ECHO TARGET HR: 151

## 2025-07-07 PROCEDURE — A9500 TC99M SESTAMIBI: HCPCS | Performed by: INTERNAL MEDICINE

## 2025-07-07 PROCEDURE — 78452 HT MUSCLE IMAGE SPECT MULT: CPT

## 2025-07-07 PROCEDURE — 93017 CV STRESS TEST TRACING ONLY: CPT

## 2025-07-07 PROCEDURE — 250N000011 HC RX IP 250 OP 636: Performed by: INTERNAL MEDICINE

## 2025-07-07 PROCEDURE — 93016 CV STRESS TEST SUPVJ ONLY: CPT | Performed by: INTERNAL MEDICINE

## 2025-07-07 PROCEDURE — 93018 CV STRESS TEST I&R ONLY: CPT | Performed by: INTERNAL MEDICINE

## 2025-07-07 PROCEDURE — 78452 HT MUSCLE IMAGE SPECT MULT: CPT | Mod: 26 | Performed by: INTERNAL MEDICINE

## 2025-07-07 PROCEDURE — 343N000001 HC RX 343 MED OP 636: Performed by: INTERNAL MEDICINE

## 2025-07-07 RX ORDER — AMINOPHYLLINE 25 MG/ML
50-100 INJECTION, SOLUTION INTRAVENOUS
Status: COMPLETED | OUTPATIENT
Start: 2025-07-07 | End: 2025-07-07

## 2025-07-07 RX ORDER — REGADENOSON 0.08 MG/ML
0.4 INJECTION, SOLUTION INTRAVENOUS ONCE
Status: COMPLETED | OUTPATIENT
Start: 2025-07-07 | End: 2025-07-07

## 2025-07-07 RX ADMIN — TECHNETIUM TC 99M SESTAMIBI 8.5 MILLICURIE: 1 INJECTION INTRAVENOUS at 10:02

## 2025-07-07 RX ADMIN — AMINOPHYLLINE 50 MG: 25 INJECTION, SOLUTION INTRAVENOUS at 11:14

## 2025-07-07 RX ADMIN — TECHNETIUM TC 99M SESTAMIBI 30.1 MILLICURIE: 1 INJECTION INTRAVENOUS at 11:05

## 2025-07-07 RX ADMIN — REGADENOSON 0.4 MG: 0.08 INJECTION, SOLUTION INTRAVENOUS at 11:06

## 2025-07-17 LAB — CV ZIO PRELIM RESULTS: NORMAL

## 2025-08-13 ENCOUNTER — LAB REQUISITION (OUTPATIENT)
Dept: LAB | Facility: CLINIC | Age: 70
End: 2025-08-13
Payer: COMMERCIAL

## 2025-08-26 ENCOUNTER — OFFICE VISIT (OUTPATIENT)
Dept: PHARMACY | Facility: PHYSICIAN GROUP | Age: 70
End: 2025-08-26
Payer: COMMERCIAL

## 2025-08-26 DIAGNOSIS — E11.9 DIABETES MELLITUS TYPE II, NON INSULIN DEPENDENT (H): Primary | ICD-10-CM

## 2025-08-31 ENCOUNTER — HEALTH MAINTENANCE LETTER (OUTPATIENT)
Age: 70
End: 2025-08-31

## (undated) DEVICE — CANNULA VESSEL 3ML BEVELED DPL 30000

## (undated) DEVICE — GOWN IMPERVIOUS BREATHABLE SMART LG 89015

## (undated) DEVICE — SU PROLENE 7-0 BV175-6 24" 2 ARM MONOFILAMENT M8735

## (undated) DEVICE — SU MYOSTERNAL WIRE  046-267

## (undated) DEVICE — ESU PENCIL SMOKE EVAC W/ROCKER SWITCH 0703-047-000

## (undated) DEVICE — RX SURGIFLO HEMOSTATIC MATRIX W/THROMBIN 8ML 2994

## (undated) DEVICE — CUSTOM PACK CAB SCV5BCBHEA

## (undated) DEVICE — SU VICRYL+ 3-0 27IN SH UND VCP416H

## (undated) DEVICE — BANDAGE ELASTIC 6"DBL LF STRL

## (undated) DEVICE — CUSTOM PACK CORONARY SAN5BCRHEA

## (undated) DEVICE — SLEEVE TR BAND RADIAL COMPRESSION DEVICE 24CM TRB24-REG

## (undated) DEVICE — SU DERMABOND ADVANCED .7ML DNX12

## (undated) DEVICE — CLIP HORIZON MULTI SM YELLOW 001204

## (undated) DEVICE — HEMOSTASIS BONE OSTENE 2.5G SYNTHETIC 1503832

## (undated) DEVICE — SOL NACL 0.9% IRRIG 1000ML BOTTLE 2F7124

## (undated) DEVICE — SOL WATER IRRIG 1000ML BOTTLE 2F7114

## (undated) DEVICE — TUBING INSUFFLATION W/FILTER 28-0207

## (undated) DEVICE — Device

## (undated) DEVICE — SUTURE STRATAFIX PDS 3-0 SH

## (undated) DEVICE — CABLE MYO/LEAD PACING BLUE DISP 019-535

## (undated) DEVICE — SU STRATAFIX PDS PLUS 2-0 SPIRAL CT-1 30CM SXPP1B410

## (undated) DEVICE — PREP CHLORAPREP 26ML TINTED HI-LITE ORANGE 930815

## (undated) DEVICE — RX VISTASEAL FIBRIN SEALANT W/THROMBIN 10ML VST10

## (undated) DEVICE — SU STRATAFIX PDS PLUS 0 CT 45CM SXPP1A406

## (undated) DEVICE — CATH THORACIC STRAIGHT CLOTSTOP 28FR

## (undated) DEVICE — SYR ANGIOGRAPHY MULTIUSE KIT ACIST 014612

## (undated) DEVICE — SU MONOCRYL+ 4-0 18IN PS2 UND MCP496G

## (undated) DEVICE — SU STRATAFIX MONOCRYL 4-0 SPIRAL PS-2 SXMP1B118

## (undated) DEVICE — SU PROLENE 6-0 C-1DA 30" M8706

## (undated) DEVICE — SUCTION CANISTER MEDIVAC LINER 3000ML W/LID 65651-530

## (undated) DEVICE — SU PROLENE 4-0 SHDA 36" 8521H

## (undated) DEVICE — BANDAGE ESMARK 4 X 3 YARDS STL 23578-143

## (undated) DEVICE — CATH SUCTION 14FR W/O CTRL DYND41962

## (undated) DEVICE — GEL ULTRASOUND AQUASONIC 20GM 01-01

## (undated) DEVICE — SURGICEL POWDER ABSORBABLE HEMOSTAT 3GM 3013SP

## (undated) DEVICE — BLANKET BAIR ADLT PLYMR 60X36IN 63000

## (undated) DEVICE — SU ETHIBOND 3-0 BBDA 36" X588H

## (undated) DEVICE — SU STERNAL WIRE SINGLE #6 046-032

## (undated) DEVICE — GLOVE LINER COTTON MED 32-2076

## (undated) DEVICE — ESU GROUND PAD ADULT REM W/15' CORD E7507DB

## (undated) DEVICE — SUTURE MONOCRYL+ 4-0 PS-2 27IN MCP426H

## (undated) DEVICE — MANIFOLD KIT ANGIO AUTOMATED 014613

## (undated) DEVICE — CATH THORACIC STRAIGHT CLOTSTOP 32FR

## (undated) DEVICE — SU SILK 3-0 SH 30" K832H

## (undated) DEVICE — LEAD PACER MYOCARDIAL BIPOLAR TEMPORARY 53CM 6495F

## (undated) DEVICE — PUNCH AORTIC 4.0MMX8" RCB40

## (undated) DEVICE — CONNECTOR CARDIO BLAKE DRAIN BCC2

## (undated) DEVICE — CLIP APPLIER 11" MED LIGACLIP MCM20

## (undated) DEVICE — SU PROLENE 7-0 BV175-6 24" 8735H

## (undated) DEVICE — KIT ENDO VASOVIEW HEMOPRO 2 VH-4000

## (undated) DEVICE — DEVICE TISSUE STABILIZATION OCTOBASE 28707

## (undated) DEVICE — CATH DIAGNOSTIC RADIAL 5FR TIG 4.0

## (undated) DEVICE — GLOVE GAMMEX NEOPRENE ULTRA SZ 7 LF 8514

## (undated) DEVICE — CUSTOM PACK CV ST JOES SCV5BCVHEA

## (undated) DEVICE — ESU ELEC BLADE 2.75" COATED/INSULATED E1455

## (undated) DEVICE — CABLE MYO/LEAD PACING WHITE DISP 019-530

## (undated) DEVICE — PACK MAJOR BASIN 673

## (undated) DEVICE — CLIP HORIZON MULTI MED BLUE 002204

## (undated) DEVICE — DRSG DRAIN 4X4" 7086

## (undated) DEVICE — ELECTRODE DEFIB CADENCE 22550R

## (undated) DEVICE — SHTH INTRO 0.021IN ID 6FR DIA

## (undated) DEVICE — CONTAINER URINE SPEC 4OZ STRL 1053

## (undated) DEVICE — CONNECTOR BLAKE DRAIN SGL BCC1

## (undated) DEVICE — CELL SAVER

## (undated) DEVICE — LIGACLIP LARGE AESCULAP O4120-1

## (undated) DEVICE — PITCHER STERILE 1000ML  SSK9004A

## (undated) DEVICE — CLIP SPRING FOGARTY SOFTJAW CSOFT6

## (undated) DEVICE — SU PLEDGET SOFT TFE 3/8"X3/26"X1/16" PCP40

## (undated) DEVICE — KIT HAND CONTROL ACIST 014644 AR-P54

## (undated) DEVICE — DRSG KERLIX 4 1/2"X4YDS ROLL 6715

## (undated) DEVICE — SUCTION DRY CHEST DRAIN OASIS 3600-100

## (undated) RX ORDER — FENTANYL CITRATE 50 UG/ML
INJECTION, SOLUTION INTRAMUSCULAR; INTRAVENOUS
Status: DISPENSED
Start: 2024-10-25

## (undated) RX ORDER — HEPARIN SODIUM 1000 [USP'U]/ML
INJECTION, SOLUTION INTRAVENOUS; SUBCUTANEOUS
Status: DISPENSED
Start: 2024-10-25

## (undated) RX ORDER — FENTANYL CITRATE 50 UG/ML
INJECTION, SOLUTION INTRAMUSCULAR; INTRAVENOUS
Status: DISPENSED
Start: 2024-10-23

## (undated) RX ORDER — VANCOMYCIN HYDROCHLORIDE 1 G/20ML
INJECTION, POWDER, LYOPHILIZED, FOR SOLUTION INTRAVENOUS
Status: DISPENSED
Start: 2024-10-25

## (undated) RX ORDER — PROPOFOL 10 MG/ML
INJECTION, EMULSION INTRAVENOUS
Status: DISPENSED
Start: 2024-10-25

## (undated) RX ORDER — DIAZEPAM 5 MG/1
TABLET ORAL
Status: DISPENSED
Start: 2024-10-23

## (undated) RX ORDER — EPHEDRINE SULFATE 50 MG/ML
INJECTION, SOLUTION INTRAMUSCULAR; INTRAVENOUS; SUBCUTANEOUS
Status: DISPENSED
Start: 2024-10-25